# Patient Record
Sex: MALE | Race: WHITE | NOT HISPANIC OR LATINO | Employment: FULL TIME | URBAN - METROPOLITAN AREA
[De-identification: names, ages, dates, MRNs, and addresses within clinical notes are randomized per-mention and may not be internally consistent; named-entity substitution may affect disease eponyms.]

---

## 2018-08-21 ENCOUNTER — HOSPITAL ENCOUNTER (OUTPATIENT)
Dept: RADIOLOGY | Facility: HOSPITAL | Age: 43
Discharge: HOME | End: 2018-08-21
Attending: UROLOGY
Payer: COMMERCIAL

## 2018-08-21 ENCOUNTER — OFFICE VISIT (OUTPATIENT)
Dept: UROLOGY | Facility: CLINIC | Age: 43
End: 2018-08-21
Payer: COMMERCIAL

## 2018-08-21 ENCOUNTER — TELEPHONE (OUTPATIENT)
Dept: UROLOGY | Facility: CLINIC | Age: 43
End: 2018-08-21

## 2018-08-21 DIAGNOSIS — R10.9 FLANK PAIN: ICD-10-CM

## 2018-08-21 DIAGNOSIS — N20.1 URETERAL STONE: ICD-10-CM

## 2018-08-21 DIAGNOSIS — R31.29 MICROHEMATURIA: Primary | ICD-10-CM

## 2018-08-21 DIAGNOSIS — K40.20 BILATERAL INGUINAL HERNIA WITHOUT OBSTRUCTION OR GANGRENE, RECURRENCE NOT SPECIFIED: ICD-10-CM

## 2018-08-21 DIAGNOSIS — R31.29 MICROHEMATURIA: ICD-10-CM

## 2018-08-21 LAB
BACTERIA #/AREA URNS AUTO: ABNORMAL /HPF
BILIRUB UR QL STRIP: NEGATIVE
CLARITY UR REFRACT.AUTO: CLEAR
COLOR UR AUTO: YELLOW
GLUCOSE UR QL STRIP: NEGATIVE
HGB UR QL STRIP: ABNORMAL
KETONES UR QL STRIP: NEGATIVE
LEUKOCYTE ESTERASE UR QL STRIP: NEGATIVE
MICROSCOPIC COMMENT: ABNORMAL
NITRITE UR QL STRIP: NEGATIVE
PH UR STRIP: 6 [PH] (ref 5–8)
PROT UR QL STRIP: NEGATIVE
RBC #/AREA URNS AUTO: 46 /HPF (ref 0–4)
SP GR UR STRIP: 1.02 (ref 1–1.03)
URN SPEC COLLECT METH UR: ABNORMAL
UROBILINOGEN UR STRIP-ACNC: NEGATIVE EU/DL
WBC #/AREA URNS AUTO: 1 /HPF (ref 0–5)

## 2018-08-21 PROCEDURE — 81001 URINALYSIS AUTO W/SCOPE: CPT

## 2018-08-21 PROCEDURE — 74018 RADEX ABDOMEN 1 VIEW: CPT | Mod: 26,,, | Performed by: RADIOLOGY

## 2018-08-21 PROCEDURE — 99243 OFF/OP CNSLTJ NEW/EST LOW 30: CPT | Mod: 25,S$GLB,, | Performed by: UROLOGY

## 2018-08-21 PROCEDURE — 87086 URINE CULTURE/COLONY COUNT: CPT

## 2018-08-21 PROCEDURE — 74018 RADEX ABDOMEN 1 VIEW: CPT | Mod: TC

## 2018-08-21 PROCEDURE — 99999 PR PBB SHADOW E&M-NEW PATIENT-LVL II: CPT | Mod: PBBFAC,,, | Performed by: UROLOGY

## 2018-08-21 PROCEDURE — 81001 URINALYSIS AUTO W/SCOPE: CPT | Mod: S$GLB,,, | Performed by: UROLOGY

## 2018-08-21 RX ORDER — KETOROLAC TROMETHAMINE 10 MG/1
10 TABLET, FILM COATED ORAL EVERY 6 HOURS PRN
Qty: 10 TABLET | Refills: 0 | Status: SHIPPED | OUTPATIENT
Start: 2018-08-21 | End: 2018-08-26

## 2018-08-21 NOTE — PROGRESS NOTES
Subjective:       Patient ID: Darek Mcnair is a 43 y.o. male.    Chief Complaint: flank pain    Darek Mcnair is a 43 y.o. Male referred from Dejuan Hoyt, * for flank pain on left.   Started last night 7-8. No n/v.  No known history of kidney stones.   No gross hematuria.   No testicular pain.     2 sugar free teas a day.   3 salazar a day    Good bit of red meat and salt.   Not a lot of oxalate.     No family hx of stones.     No smoking history.     He does have baseline urinary frequency.        No past surgical history on file.    No past medical history on file.    Social History     Socioeconomic History    Marital status: Single     Spouse name: Not on file    Number of children: Not on file    Years of education: Not on file    Highest education level: Not on file   Social Needs    Financial resource strain: Not on file    Food insecurity - worry: Not on file    Food insecurity - inability: Not on file    Transportation needs - medical: Not on file    Transportation needs - non-medical: Not on file   Occupational History    Not on file   Tobacco Use    Smoking status: Not on file   Substance and Sexual Activity    Alcohol use: Not on file    Drug use: Not on file    Sexual activity: Not on file   Other Topics Concern    Not on file   Social History Narrative    Not on file       No family history on file.    No current outpatient medications on file.     No current facility-administered medications for this visit.        Review of patient's allergies indicates:  Allergies not on file     BMP  No results found for: NA, K, CL, CO2, BUN, CREATININE, CALCIUM, ANIONGAP, ESTGFRAFRICA, EGFRNONAA    Review of Systems   Constitutional: Negative for chills, fever and unexpected weight change.   HENT: Negative for hearing loss and nosebleeds.    Eyes: Negative for visual disturbance.   Respiratory: Negative for chest tightness.    Cardiovascular: Negative for chest pain.    Gastrointestinal: Negative for diarrhea.   Genitourinary: Positive for flank pain and frequency. Negative for dysuria, nocturia and urgency.   Musculoskeletal: Negative for back pain and joint swelling.   Skin: Negative for rash.   Neurological: Negative for seizures.   Hematological: Does not bruise/bleed easily.   Psychiatric/Behavioral: Negative for behavioral problems.     Objective:      Physical Exam   Constitutional: He is oriented to person, place, and time. He appears well-developed and well-nourished.   HENT:   Head: Normocephalic and atraumatic.   Eyes: No scleral icterus.   Neck: Neck supple.   Cardiovascular: Normal rate and regular rhythm.    Pulmonary/Chest: Effort normal. No respiratory distress.   Abdominal: He exhibits no mass. A hernia is present. Hernia confirmed positive in the right inguinal area and confirmed positive in the left inguinal area.   Genitourinary: Testes normal and penis normal.   Musculoskeletal: He exhibits no tenderness.   Lymphadenopathy:     He has no cervical adenopathy. No inguinal adenopathy noted on the right or left side.   Neurological: He is alert and oriented to person, place, and time.   Skin: Skin is warm. No rash noted.     Psychiatric: He has a normal mood and affect.     urine 2+ blood. Micro many RBC. No WBC. No crystals.  Assessment:       1. Microhematuria    2. Flank pain    3. Ureteral stone    4. Bilateral inguinal hernia without obstruction or gangrene, recurrence not specified        Plan:   Diagnoses and all orders for this visit:    Microhematuria  -     CT Renal Stone Study ABD Pelvis WO; Future  -     X-Ray Abdomen AP 1 View; Future  -     Urinalysis  -     Urine culture; Future  -     Basic metabolic panel; Future  -     Urine culture    Flank pain  -     CT Renal Stone Study ABD Pelvis WO; Future  -     X-Ray Abdomen AP 1 View; Future  -     Urinalysis  -     Urine culture; Future  -     Basic metabolic panel; Future  -     Urine  culture    Ureteral stone    Bilateral inguinal hernia without obstruction or gangrene, recurrence not specified      Low oxalate diet (limit spinach, rhubarb, nuts, beets, potatoes, chocolate)  2-3 liters of water a day. (avoid sodas, iced tea)  Limit salt.  Lots of fruits and veggies.  Limit non-dairy animal protein  Make sure you have milk and or yogurt in the diet  No tums, rolaids, vitamin C supplements, Multivitamin  Observe hernias for now.     KUB - if no stone seen, will schedule CT RSS.   Bmp for baseline.   Strain urine for stone analysis.   If not stone seen, will proceed with hematuria workup.    I would like to thank Dejuan Hoyt, * for referral of this patient.

## 2018-08-21 NOTE — TELEPHONE ENCOUNTER
----- Message from Brianna Mo MD sent at 8/21/2018  1:08 PM CDT -----  Let patient know that no stones seen on plain x ray. I will let him know once I get results of CT.   Keep straining urine every single time he urinates.   I sent toradol, just in case he needs it for pain

## 2018-08-21 NOTE — LETTER
August 21, 2018      Dejuan Hoyt MD  [address] [facility] [address]           [facility] - Urology 4th Floor  [address]  [facility] [address]  Phone: [phone]          Patient: Darek Mcnair   MR Number: [id number]   YOB: 1975   Date of Visit: 8/21/2018       Dear Dr. Dejuan Hoyt:    Thank you for referring Darek Mcnair to me for evaluation. Attached you will find relevant portions of my assessment and plan of care.    If you have questions, please do not hesitate to call me. I look forward to following Darek Mcnair along with you.    Sincerely,    Brianna Mo MD    Enclosure  CC:  No Recipients    If you would like to receive this communication electronically, please contact [email or web address] or [phone] to request more information on [employer] Link access.    For providers and/or their staff who would like to refer a patient to [facility], please contact us through our one-stop-shop provider referral line, [facility], at [phone].    If you feel you have received this communication in error or would no longer like to receive these types of communications, please e-mail [email or web address]

## 2018-08-22 LAB — BACTERIA UR CULT: NO GROWTH

## 2018-08-23 ENCOUNTER — HOSPITAL ENCOUNTER (OUTPATIENT)
Dept: RADIOLOGY | Facility: HOSPITAL | Age: 43
Discharge: HOME | End: 2018-08-23
Attending: UROLOGY
Payer: COMMERCIAL

## 2018-08-23 DIAGNOSIS — R31.29 MICROHEMATURIA: ICD-10-CM

## 2018-08-23 DIAGNOSIS — R10.9 FLANK PAIN: ICD-10-CM

## 2018-08-23 PROCEDURE — 74176 CT ABD & PELVIS W/O CONTRAST: CPT | Mod: TC

## 2018-08-23 PROCEDURE — 74176 CT ABD & PELVIS W/O CONTRAST: CPT | Mod: 26,,, | Performed by: RADIOLOGY

## 2018-08-28 ENCOUNTER — PATIENT MESSAGE (OUTPATIENT)
Dept: UROLOGY | Facility: CLINIC | Age: 43
End: 2018-08-28

## 2018-09-04 ENCOUNTER — LAB VISIT (OUTPATIENT)
Dept: LAB | Facility: HOSPITAL | Age: 43
End: 2018-09-04
Attending: UROLOGY
Payer: COMMERCIAL

## 2018-09-04 ENCOUNTER — TELEPHONE (OUTPATIENT)
Dept: UROLOGY | Facility: CLINIC | Age: 43
End: 2018-09-04

## 2018-09-04 DIAGNOSIS — N20.0 KIDNEY STONE: ICD-10-CM

## 2018-09-04 DIAGNOSIS — N20.0 KIDNEY STONE: Primary | ICD-10-CM

## 2018-09-04 PROCEDURE — 82365 CALCULUS SPECTROSCOPY: CPT

## 2018-09-05 LAB
ANNOTATION COMMENT IMP: NORMAL
COMPN STONE: NORMAL
SPECIMEN SOURCE: NORMAL
STONE ANALYSIS IR-IMP: NORMAL

## 2018-09-11 ENCOUNTER — PATIENT MESSAGE (OUTPATIENT)
Dept: UROLOGY | Facility: CLINIC | Age: 43
End: 2018-09-11

## 2020-04-21 DIAGNOSIS — Z01.84 ANTIBODY RESPONSE EXAMINATION: ICD-10-CM

## 2020-04-22 ENCOUNTER — LAB VISIT (OUTPATIENT)
Dept: LAB | Facility: HOSPITAL | Age: 45
End: 2020-04-22
Attending: INTERNAL MEDICINE
Payer: COMMERCIAL

## 2020-04-22 DIAGNOSIS — Z01.84 ANTIBODY RESPONSE EXAMINATION: ICD-10-CM

## 2020-04-22 LAB — SARS-COV-2 IGG SERPL QL IA: NEGATIVE

## 2020-04-22 PROCEDURE — 36415 COLL VENOUS BLD VENIPUNCTURE: CPT

## 2020-04-22 PROCEDURE — 86769 SARS-COV-2 COVID-19 ANTIBODY: CPT

## 2020-07-28 ENCOUNTER — TELEPHONE (OUTPATIENT)
Dept: INTERNAL MEDICINE | Facility: CLINIC | Age: 45
End: 2020-07-28

## 2020-07-28 DIAGNOSIS — Z03.818 ENCOUNTER FOR OBSERVATION FOR SUSPECTED EXPOSURE TO OTHER BIOLOGICAL AGENTS RULED OUT: ICD-10-CM

## 2020-07-29 ENCOUNTER — LAB VISIT (OUTPATIENT)
Dept: INTERNAL MEDICINE | Facility: CLINIC | Age: 45
End: 2020-07-29
Payer: COMMERCIAL

## 2020-07-29 ENCOUNTER — TELEPHONE (OUTPATIENT)
Dept: INTERNAL MEDICINE | Facility: CLINIC | Age: 45
End: 2020-07-29

## 2020-07-29 DIAGNOSIS — Z03.818 ENCOUNTER FOR OBSERVATION FOR SUSPECTED EXPOSURE TO OTHER BIOLOGICAL AGENTS RULED OUT: ICD-10-CM

## 2020-07-29 LAB — SARS-COV-2 RNA RESP QL NAA+PROBE: NOT DETECTED

## 2020-07-29 PROCEDURE — U0003 INFECTIOUS AGENT DETECTION BY NUCLEIC ACID (DNA OR RNA); SEVERE ACUTE RESPIRATORY SYNDROME CORONAVIRUS 2 (SARS-COV-2) (CORONAVIRUS DISEASE [COVID-19]), AMPLIFIED PROBE TECHNIQUE, MAKING USE OF HIGH THROUGHPUT TECHNOLOGIES AS DESCRIBED BY CMS-2020-01-R: HCPCS

## 2020-07-29 NOTE — TELEPHONE ENCOUNTER
Please call Mr. Mcnair to schedule a COVID nasal swab.  I was not sure exactly when he needed to have this test done it done.  He must have a negative COVID nasal swab in order to do his job.  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

## 2020-09-30 ENCOUNTER — OFFICE VISIT (OUTPATIENT)
Dept: ORTHOPEDICS | Facility: CLINIC | Age: 45
End: 2020-09-30
Payer: COMMERCIAL

## 2020-09-30 ENCOUNTER — HOSPITAL ENCOUNTER (OUTPATIENT)
Dept: RADIOLOGY | Facility: HOSPITAL | Age: 45
Discharge: HOME | End: 2020-09-30
Attending: PHYSICIAN ASSISTANT
Payer: COMMERCIAL

## 2020-09-30 ENCOUNTER — PATIENT MESSAGE (OUTPATIENT)
Dept: ORTHOPEDICS | Facility: CLINIC | Age: 45
End: 2020-09-30

## 2020-09-30 VITALS
BODY MASS INDEX: 26.66 KG/M2 | HEART RATE: 88 BPM | SYSTOLIC BLOOD PRESSURE: 116 MMHG | TEMPERATURE: 97 F | HEIGHT: 77 IN | DIASTOLIC BLOOD PRESSURE: 69 MMHG | WEIGHT: 225.75 LBS | RESPIRATION RATE: 18 BRPM

## 2020-09-30 DIAGNOSIS — M76.71 PERONEAL TENDONITIS OF RIGHT LOWER EXTREMITY: Primary | ICD-10-CM

## 2020-09-30 DIAGNOSIS — M25.571 ACUTE RIGHT ANKLE PAIN: ICD-10-CM

## 2020-09-30 PROCEDURE — 99999 PR PBB SHADOW E&M-EST. PATIENT-LVL III: CPT | Mod: PBBFAC,,, | Performed by: PHYSICIAN ASSISTANT

## 2020-09-30 PROCEDURE — 73610 X-RAY EXAM OF ANKLE: CPT | Mod: TC,RT

## 2020-09-30 PROCEDURE — 73610 X-RAY EXAM OF ANKLE: CPT | Mod: 26,RT,, | Performed by: RADIOLOGY

## 2020-09-30 PROCEDURE — 3008F BODY MASS INDEX DOCD: CPT | Mod: CPTII,S$GLB,, | Performed by: PHYSICIAN ASSISTANT

## 2020-09-30 PROCEDURE — 3008F PR BODY MASS INDEX (BMI) DOCUMENTED: ICD-10-PCS | Mod: CPTII,S$GLB,, | Performed by: PHYSICIAN ASSISTANT

## 2020-09-30 PROCEDURE — 99999 PR PBB SHADOW E&M-EST. PATIENT-LVL III: ICD-10-PCS | Mod: PBBFAC,,, | Performed by: PHYSICIAN ASSISTANT

## 2020-09-30 PROCEDURE — 99203 PR OFFICE/OUTPT VISIT, NEW, LEVL III, 30-44 MIN: ICD-10-PCS | Mod: S$GLB,,, | Performed by: PHYSICIAN ASSISTANT

## 2020-09-30 PROCEDURE — 73610 XR ANKLE COMPLETE 3 VIEW RIGHT: ICD-10-PCS | Mod: 26,RT,, | Performed by: RADIOLOGY

## 2020-09-30 PROCEDURE — 99203 OFFICE O/P NEW LOW 30 MIN: CPT | Mod: S$GLB,,, | Performed by: PHYSICIAN ASSISTANT

## 2020-10-01 NOTE — PROGRESS NOTES
Subjective:      Patient ID: Darek Mcnair is a 45 y.o. male.    Chief Complaint: Pain of the Right Ankle    HPI    Patient is a 45 year old male who presents to clinic with chief complaint of a-traumatic right lateral ankle pain x 1 week. Patient stated that he first noticed it after exercising on a treadmill. He reports that it got a little better, but then after a day of working on his feet the pain increased. Pain is increased with activity. He has taken advil. He denied numbness or tingling.     Review of Systems   Constitution: Negative for chills and fever.   Cardiovascular: Negative for chest pain.   Respiratory: Negative for cough and shortness of breath.    Skin: Negative for color change, dry skin, itching, nail changes, poor wound healing and rash.   Musculoskeletal:        Right ankle pain   Neurological: Negative for dizziness.   Psychiatric/Behavioral: Negative for altered mental status. The patient is not nervous/anxious.    All other systems reviewed and are negative.        Objective:      General    Constitutional: He is oriented to person, place, and time. He appears well-developed and well-nourished. No distress.   HENT:   Head: Atraumatic.   Eyes: Conjunctivae are normal.   Cardiovascular: Normal rate.    Pulmonary/Chest: Effort normal.   Neurological: He is alert and oriented to person, place, and time.   Psychiatric: He has a normal mood and affect. His behavior is normal.         Right Ankle/Foot Exam     Tenderness   The patient is tender to palpation of the peroneals.    Range of Motion   The patient has normal right ankle ROM.    Muscle Strength   The patient has normal right ankle strength.    Other   Sensation: normal        RADS: reviewed by myself, no fracture or dislocation.       Assessment:       Encounter Diagnosis   Name Primary?    Peroneal tendonitis of right lower extremity Yes          Plan:     Discussed treatment options/ plan with the patient. At this time we will  treated with functional rest, ice, NSIAD x 2 weeks and boot which he will wean from by 2 weeks. He is to return to clinic as needed if continued symptoms consider formal PT.

## 2020-11-24 ENCOUNTER — PATIENT MESSAGE (OUTPATIENT)
Dept: ORTHOPEDICS | Facility: CLINIC | Age: 45
End: 2020-11-24

## 2020-11-24 DIAGNOSIS — M76.71 PERONEAL TENDONITIS OF RIGHT LOWER EXTREMITY: Primary | ICD-10-CM

## 2020-12-04 ENCOUNTER — CLINICAL SUPPORT (OUTPATIENT)
Dept: REHABILITATION | Facility: HOSPITAL | Age: 45
End: 2020-12-04
Payer: COMMERCIAL

## 2020-12-04 DIAGNOSIS — M76.71 PERONEAL TENDONITIS OF RIGHT LOWER EXTREMITY: ICD-10-CM

## 2020-12-04 PROCEDURE — 97110 THERAPEUTIC EXERCISES: CPT

## 2020-12-04 PROCEDURE — 97161 PT EVAL LOW COMPLEX 20 MIN: CPT

## 2020-12-04 NOTE — PLAN OF CARE
"[facility]  Physical Therapy Initial Evaluation    Date: 12/4/2020   Name: Darek Mcnair  Clinic Number: [id number]    Therapy Diagnosis: No diagnosis found.  Physician: Meredith Trinidad PA-C    Physician Orders: PT Eval and Treat   Medical Diagnosis from Referral: Peroneal tendonitis of right lower extremity   Evaluation Date: 12/4/2020   Authorization Period Expiration: 12/31/2020   Plan of Care Expiration: 1/29/2021  Visit # / Visits authorized: 1/ 20    Time In: 1345  Time Out: 1420  Total Appointment Time (timed & untimed codes): 35 minutes    Precautions: Standard    Subjective   Date of onset: End of Sept  History of current condition - JJ reports: was on a treadmill during Monday night football ran for about 20 min. Next morning R foot near the ankle was sore, and it got worse throughout the day.   Wore a walking boot 2 weeks, iced regularly. Has not been running since injury. Overall progress but slow progress with frequent "steps" backward.   Feels different from a sprained ankle which he has had multiple times both sides.   Has been wearing a lace up ankle brace for about 2 wks which helps somewhat.     Medical History:   No past medical history on file.    Surgical History:   Darek Mcnair  has a past surgical history that includes Eye surgery.    Medications:   Darek currently has no medications in their medication list.    Allergies:   Review of patient's allergies indicates:  No Known Allergies     Imaging, xray: 9/30/20Impression:     Suspect small capsular avulsion fracture of the inferior tip of the lateral malleolus.    Prior Therapy: none  Social History:  lives with their spouse  Occupation: clinical support ophthalmology    Prior Level of Function: independent  Current Level of Function: unable to run. Intermittent severe pain with standing and gait.     Pain:  Current 1/10, worst 7/10, best 0/10   Location: { right ankle(s)  Description: Aching, Dull, Tight " "and Sharp  Aggravating Factors: worst in mornings. Walking, prolonged weight bearing  Easing Factors: rest    Pts goals: return to treadmill running    Objective     Observation: 46 y/o male no acute distress    Gait: wide BENJY, reduced R stance time    Active Range of Motion: WNL all planes    Strength:  Ankle Right Left   Dorsiflexion 4+ 5   Plantarflexion 4+ 5   Inversion 5 5   Eversion 4 5     Special Tests:  Anterior Drawer -   Talar tilt + R    Squeeze test -   Perez -     Joint Mobility: TCJ and subtalar joints WNL    Palpation: TTP distal fibula    Sensation: intact    Functional Tests:   SLS EO: 15 sec R, 3-+ L    Limitation/Restriction for FOTO ankle Survey    Therapist reviewed FOTO scores for Darek Mcnair on 12/4/2020.   FOTO documents entered into [employer] - see Media section.    Limitation Score: 33%         TREATMENT   Treatment Time In: 1410  Treatment Time Out: 1420  Total Treatment time (time-based codes) separate from Evaluation: 10 minutes    JJ received therapeutic exercises to develop strength and endurance for 10 minutes including:  Ankle eversion with OTB  SL stance 3x30"  Next: LE kinetic chain strengthening    Peroneal offloading K tape provided    Home Exercises and Patient Education Provided    Education provided:   - Diagnosis, prognosis, relevant anatomy, role of therapy      Written Home Exercises Provided: yes.  Exercises were reviewed and patient was able to demonstrate them prior to the end of the session.  Patient demonstrated good  understanding of the education provided.     See EMR under Patient Instructions for exercisesprovided 12/4/2020.    Assessment   Darek is a 45 y.o. male referred to outpatient Physical Therapy with a medical diagnosis of Peroneal tendonitis of right lower extremity. Pt presents with reduced strength and motor control of R LE contributing to altered gait mechanics and reduced function. Pt will benefit from skilled PT to return to high PLOF with " reduced pain.     Pt prognosis is Good.   Pt will benefit from skilled outpatient Physical Therapy to address the deficits stated above and in the chart below, provide pt/family education, and to maximize pt's level of independence.     Plan of care discussed with patient: Yes  Pt's spiritual, cultural and educational needs considered and patient is agreeable to the plan of care and goals as stated below:     Anticipated Barriers for therapy: none    Medical Necessity is demonstrated by the following  History  Co-morbidities and personal factors that may impact the plan of care Co-morbidities:   see above    Personal Factors:   no deficits     low   Examination  Body Structures and Functions, activity limitations and participation restrictions that may impact the plan of care Body Regions:   lower extremities    Body Systems:    gross symmetry  strength  gross coordinated movement  balance  gait  motor control    Participation Restrictions:   covid-19    Activity limitations:   Learning and applying knowledge  no deficits    General Tasks and Commands  no deficits    Communication  no deficits    Mobility  walking    Self care  no deficits    Domestic Life  no deficits    Interactions/Relationships  no deficits    Life Areas  no deficits    Community and Social Life  no deficits         high   Clinical Presentation stable and uncomplicated low   Decision Making/ Complexity Score: low     GOALS: Short Term Goals:  4 weeks  1.Report decreased ankle pain  < / =  3/10  to increase tolerance for walking  2. Increase SL balance to 30 sec  3. Increase strength by 1/3 MMT grade for  R ankle eversion  to increase tolerance for ADL and work activities.  4. Pt to tolerate HEP to improve ROM and independence with ADL's    Long Term Goals: 8 weeks  1.Report decreased ankle pain  < / =  1/10  to increase tolerance for walking  2.Patient goal: running treadmill 20 min  3.Increase strength to 5/5 for  eversion  to increase  tolerance for ADL and work activities.  4. Pt will report at CJ level (20-40% impaired) on Modified FIM score for mobility to demonstrate increase in LE function and mobility in home and community environment.       Plan   Plan of care Certification: 12/4/2020 to 1/29/2021.    Outpatient Physical Therapy 1-2 times weekly for 8 weeks to include the following interventions: Moist Heat/ Ice, Neuromuscular Re-ed, Patient Education, Therapeutic Activites and Therapeutic Exercise.     Benjy Alvarez, PT

## 2020-12-11 ENCOUNTER — CLINICAL SUPPORT (OUTPATIENT)
Dept: REHABILITATION | Facility: HOSPITAL | Age: 45
End: 2020-12-11
Payer: COMMERCIAL

## 2020-12-11 PROCEDURE — 97140 MANUAL THERAPY 1/> REGIONS: CPT | Mod: CQ

## 2020-12-11 PROCEDURE — 97110 THERAPEUTIC EXERCISES: CPT | Mod: CQ

## 2020-12-11 NOTE — PROGRESS NOTES
"  Physical Therapy Daily Treatment Note     Name: Darek [facility] Number: [id number]    Therapy Diagnosis: No diagnosis found.  Physician: Meredith Trinidad PA-C    Visit Date: 12/11/2020    Physician Orders: PT Eval and Treat   Medical Diagnosis from Referral: Peroneal tendonitis of right lower extremity   Evaluation Date: 12/4/2020   Authorization Period Expiration: 12/31/2020   Plan of Care Expiration: 1/29/2021  Visit # / Visits authorized: 2/20     Time In: 1515  Time Out: 1600  Total Billable Time: 40 minutes    Precautions: Standard    Subjective     Pt reports: Pt returns to therapy this pm with no c/o R ankle pain upon entry. He states he had a good day today, but had a "set back" on Wednesday. His pain comes and goes.     He was compliant with home exercise program.  Response to previous treatment: No adverse effects  Functional change: N/A    Pain: 0/10, 4/10 on Wednesday  Location: right foot/ankle    Objective     AZUCENA received therapeutic exercises to develop strength, endurance, ROM and flexibility for 25 minutes including:    Inv OTB x 30  SL bridges 2 x 10  6in lateral step downs 2 x 15  Calf raises - d/c d/t pain  Sidelying SL shuttle (2.0 bands) 2 x 10  EV lateral steps c red theraloop x 1 lap  S/L clams GTB 10 x 10" yudy    JEDIE received the following manual therapy techniques: Joint mobilizations were applied for 15 minutes including:    Assessment of ankle ROM  Metatarsal A/P mobs  Midfoot inv/ev  Posterior talocrural mob c FMP into DF  Theragun to R calf/peroneals    JJ participated in neuromuscular re-education activities to improve Balance, Coordination, Proprioception and Neuromuscular Control for 05 minutes. The following activities were included:    SLS on hard foam    JEDIE participated in dynamic functional therapeutic activities to improve functional performance for 00 minutes including:      AZUCENA received cold pack for 00 minutes.      Home Exercises Provided and Patient Education " Provided     Education provided:   - Diagnosis, prognosis, relevant anatomy, role of therapy    Written Home Exercises Provided: yes.  Exercises were reviewed and AZUCENA was able to demonstrate them prior to the end of the session.  AZUCENA demonstrated good  understanding of the education provided.     See EMR under Patient Instructions for exercises provided 12/04/2020.    Assessment     Pt was able to complete all exercises (excluding calf raises) with no c/o increased ankle pain. Lack of DF improved p manual therapy. Noted fatigue during lateral step downs and resisted EV walks. Encouraged continued compliance with HEP. No adverse effects reported p tx.     AZUCENA is progressing well towards his goals.   Pt prognosis is Good.     Pt will continue to benefit from skilled outpatient physical therapy to address the deficits listed in the problem list box on initial evaluation, provide pt/family education and to maximize pt's level of independence in the home and community environment.     Pt's spiritual, cultural and educational needs considered and pt agreeable to plan of care and goals.     Anticipated barriers to physical therapy: none    GOALS: Short Term Goals:  4 weeks  1.Report decreased ankle pain  < / =  3/10  to increase tolerance for walking  2. Increase SL balance to 30 sec  3. Increase strength by 1/3 MMT grade for  R ankle eversion  to increase tolerance for ADL and work activities.  4. Pt to tolerate HEP to improve ROM and independence with ADL's     Long Term Goals: 8 weeks  1.Report decreased ankle pain  < / =  1/10  to increase tolerance for walking  2.Patient goal: running treadmill 20 min  3.Increase strength to 5/5 for  eversion  to increase tolerance for ADL and work activities.  4. Pt will report at CJ level (20-40% impaired) on Modified FIM score for mobility to demonstrate increase in LE function and mobility in home and community environment.        Plan   Plan of care Certification: 12/4/2020 to  1/29/2021.     Outpatient Physical Therapy 1-2 times weekly for 8 weeks to include the following interventions: Moist Heat/ Ice, Neuromuscular Re-ed, Patient Education, Therapeutic Activites and Therapeutic Exercise.     Saba Jon, PTA

## 2020-12-18 ENCOUNTER — OFFICE VISIT (OUTPATIENT)
Dept: URGENT CARE | Facility: CLINIC | Age: 45
End: 2020-12-18
Payer: COMMERCIAL

## 2020-12-18 VITALS
WEIGHT: 225 LBS | HEIGHT: 77 IN | HEART RATE: 96 BPM | RESPIRATION RATE: 16 BRPM | BODY MASS INDEX: 26.57 KG/M2 | OXYGEN SATURATION: 98 % | TEMPERATURE: 98 F | SYSTOLIC BLOOD PRESSURE: 126 MMHG | DIASTOLIC BLOOD PRESSURE: 75 MMHG

## 2020-12-18 DIAGNOSIS — R53.83 FATIGUE, UNSPECIFIED TYPE: Primary | ICD-10-CM

## 2020-12-18 DIAGNOSIS — U07.1 COVID-19 VIRUS DETECTED: ICD-10-CM

## 2020-12-18 LAB
CTP QC/QA: YES
SARS-COV-2 RDRP RESP QL NAA+PROBE: POSITIVE

## 2020-12-18 PROCEDURE — 99203 PR OFFICE/OUTPT VISIT, NEW, LEVL III, 30-44 MIN: ICD-10-PCS | Mod: S$GLB,,, | Performed by: PHYSICIAN ASSISTANT

## 2020-12-18 PROCEDURE — U0002: ICD-10-PCS | Mod: QW,S$GLB,, | Performed by: PHYSICIAN ASSISTANT

## 2020-12-18 PROCEDURE — 99203 OFFICE O/P NEW LOW 30 MIN: CPT | Mod: S$GLB,,, | Performed by: PHYSICIAN ASSISTANT

## 2020-12-18 PROCEDURE — 3008F BODY MASS INDEX DOCD: CPT | Mod: CPTII,S$GLB,, | Performed by: PHYSICIAN ASSISTANT

## 2020-12-18 PROCEDURE — 3008F PR BODY MASS INDEX (BMI) DOCUMENTED: ICD-10-PCS | Mod: CPTII,S$GLB,, | Performed by: PHYSICIAN ASSISTANT

## 2020-12-18 PROCEDURE — U0002 COVID-19 LAB TEST NON-CDC: HCPCS | Mod: QW,S$GLB,, | Performed by: PHYSICIAN ASSISTANT

## 2020-12-18 NOTE — PROGRESS NOTES
"Subjective:       Patient ID: Darek Mcnair is a 45 y.o. male.    Vitals:  height is 6' 5" (1.956 m) and weight is 102.1 kg (225 lb). His temperature is 97.7 °F (36.5 °C). His blood pressure is 126/75 and his pulse is 96. His respiration is 16 and oxygen saturation is 98%.     Chief Complaint: Fatigue    Patient presents to the clinic today with fatigue, slight congestion, and low grade fever. Pt states those symptoms started yesterday and that he lost his taste this morning. Pt is unaware if he has been in direct contact with anyone with covid but would like to get covid tested.     Fatigue  This is a new problem. The current episode started yesterday. The problem occurs constantly. The problem has been unchanged. Associated symptoms include congestion, fatigue and a fever. Pertinent negatives include no abdominal pain, anorexia, arthralgias, change in bowel habit, chest pain, chills, coughing, diaphoresis, headaches, joint swelling, myalgias, nausea, neck pain, numbness, rash, sore throat, swollen glands, urinary symptoms, vertigo, visual change, vomiting or weakness. Nothing aggravates the symptoms. He has tried nothing for the symptoms. The treatment provided no relief.       Constitution: Positive for fatigue and fever. Negative for chills and sweating.   HENT: Positive for congestion. Negative for sore throat.    Neck: Negative for neck pain and painful lymph nodes.   Cardiovascular: Negative for chest pain and leg swelling.   Eyes: Negative for double vision and blurred vision.   Respiratory: Negative for cough and shortness of breath.    Gastrointestinal: Negative for abdominal pain, nausea, vomiting and diarrhea.   Genitourinary: Negative for dysuria, frequency and urgency.   Musculoskeletal: Negative for joint pain, joint swelling, muscle cramps and muscle ache.   Skin: Negative for color change, pale and rash.   Allergic/Immunologic: Negative for seasonal allergies.   Neurological: Negative for " dizziness, history of vertigo, light-headedness, passing out, headaches and numbness.   Hematologic/Lymphatic: Negative for swollen lymph nodes, easy bruising/bleeding and history of blood clots. Does not bruise/bleed easily.   Psychiatric/Behavioral: Negative for nervous/anxious, sleep disturbance and depression. The patient is not nervous/anxious.        Objective:      Physical Exam   Constitutional: He is oriented to person, place, and time. He appears well-developed.  Non-toxic appearance. He does not appear ill. No distress.   HENT:   Head: Normocephalic and atraumatic.   Ears:   Right Ear: Hearing and external ear normal.   Left Ear: Hearing and external ear normal.   ENT exam deferred to keep mask in place. COVID+. Pt agreed.      Comments: ENT exam deferred to keep mask in place. COVID+. Pt agreed.  Eyes: Pupils are equal, round, and reactive to light. Conjunctivae and EOM are normal.   Neck: Normal range of motion. Neck supple. No neck rigidity.   Musculoskeletal: Normal range of motion.   Neurological: He is alert and oriented to person, place, and time.   Skin: Skin is warm, dry and not diaphoretic. Psychiatric: His speech is normal and behavior is normal. Mood normal.   Nursing note and vitals reviewed.      Office Visit on 12/18/2020   Component Date Value Ref Range Status    POC Rapid COVID 12/18/2020 Positive* Negative Final     Acceptable 12/18/2020 Yes   Final           Assessment:       1. Fatigue, unspecified type        Plan:       All hx was provided by the pt or available as part of established EMR. The pt past medical hx, family hx, social hx, and current medications were reviewed. Interpretation of diagnostics performed today were discussed. Tx discussed. Quarantine recommendations based on CDC guidelines discussed. Pt to follow up with [facility] or PCP if no improvement or for any concern. Seek treatment in an ER for worsening. Pt voiced understanding of all discussed, and  agreed.    Fatigue, unspecified type  -     POCT COVID-19 Rapid Screening      Patient Instructions   · Follow up with your primary care if symptoms do not improve, or you may return here at any time.  · If you were referred to a specialist, please follow up with that specialty.  · If you were prescribed antibiotics, please take them to completion.  · If you were prescribed a narcotic or any medication with sedative effects, do not drive or operate heavy equipment or machinery while taking these medications.  · You must understand that you have received treatment at an Urgent Care facility only, and that you may be released before all of your medical problems are known or treated. Urgent Care facilities are not equipped to handle life threatening emergencies. It is recommended that you seek care at an Emergency Department for further evaluation of worsening or concerning symptoms, or possibly life threatening conditions as discussed.                                        If you  smoke, please stop smoking               PLEASE PRACTICE SOCIAL DISTANCING      COVID rapid testing was POSITIVE.  You should quarantine for 10 days from the onset of symptoms. You should be 24 hours symptoms improved/fever free before quarantine end.     You should follow CDC guidelines as well as your employer/school protocols for safely returning to work/school. You should return to work based upon CDC guidelines, unless your employer/school has a different RTW protocol/guidance for you. If you are able to return to work, you should still quarantine outside of work based on CDC guidance as we discussed.     Please refer to CDC website for additional information - https://www.cdc.gov/coronavirus/2019-ncov/if-you-are-sick/index.html.      Over the counter and home treatment of symptoms:  Alternate tylenol and motrin every 3 hours  Salt water gargles  Cold-eeze helps to reduce the duration of sore throat symptoms  Cepachol helps to numb the  discomfort  Chloroseptic spray  Nasal saline spray reduces inflammation and dryness  Warm face compresses as often as you can  Vicks vapor rub at night  Flonase OTC or Nasacort OTC  Simple foods like chicken noodle soup help  Pedialyte helps with dehydration if lacking appetite  Rest as much as you can

## 2020-12-18 NOTE — PATIENT INSTRUCTIONS
· Follow up with your primary care if symptoms do not improve, or you may return here at any time.  · If you were referred to a specialist, please follow up with that specialty.  · If you were prescribed antibiotics, please take them to completion.  · If you were prescribed a narcotic or any medication with sedative effects, do not drive or operate heavy equipment or machinery while taking these medications.  · You must understand that you have received treatment at an Urgent Care facility only, and that you may be released before all of your medical problems are known or treated. Urgent Care facilities are not equipped to handle life threatening emergencies. It is recommended that you seek care at an Emergency Department for further evaluation of worsening or concerning symptoms, or possibly life threatening conditions as discussed.                                        If you  smoke, please stop smoking               PLEASE PRACTICE SOCIAL DISTANCING      COVID rapid testing was POSITIVE.  You should quarantine for 10 days from the onset of symptoms. You should be 24 hours symptoms improved/fever free before quarantine end.     You should follow CDC guidelines as well as your employer/school protocols for safely returning to work/school. You should return to work based upon CDC guidelines, unless your employer/school has a different RTW protocol/guidance for you. If you are able to return to work, you should still quarantine outside of work based on CDC guidance as we discussed.     Please refer to CDC website for additional information - https://www.cdc.gov/coronavirus/2019-ncov/if-you-are-sick/index.html.      Over the counter and home treatment of symptoms:  Alternate tylenol and motrin every 3 hours  Salt water gargles  Cold-eeze helps to reduce the duration of sore throat symptoms  Cepachol helps to numb the discomfort  Chloroseptic spray  Nasal saline spray reduces inflammation and dryness  Warm face  compresses as often as you can  Vicks vapor rub at night  Flonase OTC or Nasacort OTC  Simple foods like chicken noodle soup help  Pedialyte helps with dehydration if lacking appetite  Rest as much as you can

## 2020-12-28 ENCOUNTER — NURSE TRIAGE (OUTPATIENT)
Dept: ADMINISTRATIVE | Facility: CLINIC | Age: 45
End: 2020-12-28

## 2020-12-28 NOTE — TELEPHONE ENCOUNTER
Positive COVID 19 test 12/18/20. Was told generally 7-10 days and can go back to work. Needs to file for extension. Works at main campus. continues with fevers-up to 100F responds to tylenol; continues with fatigue. Gave  p#.and instructed on [facility] virtual visit- pt with no PCP to send message to for follow up.         Reason for Disposition   [1] COVID-19 infection suspected by caller or triager AND [2] mild symptoms (cough, fever, or others) AND [3] no complications or SOB    Additional Information   Negative: Severe difficulty breathing (e.g., struggling for each breath, speaks in single words)   Negative: Difficult to awaken or acting confused (e.g., disoriented, slurred speech)   Negative: Bluish (or gray) lips or face now   Negative: Shock suspected (e.g., cold/pale/clammy skin, too weak to stand, low BP, rapid pulse)   Negative: Sounds like a life-threatening emergency to the triager   Negative: SEVERE or constant chest pain (Exception: mild central chest pain, present only when coughing)   Negative: MODERATE difficulty breathing (e.g., speaks in phrases, SOB even at rest, pulse 100-120)   Negative: Patient sounds very sick or weak to the triager   Negative: Severe difficulty breathing (e.g., struggling for each breath, speaks in single words)   Negative: Difficult to awaken or acting confused (e.g., disoriented, slurred speech)   Negative: Bluish (or gray) lips or face now   Negative: Shock suspected (e.g., cold/pale/clammy skin, too weak to stand, low BP, rapid pulse)   Negative: Sounds like a life-threatening emergency to the triager   Negative: SEVERE or constant chest pain or pressure (Exception: mild central chest pain, present only when coughing)   Negative: MODERATE difficulty breathing (e.g., speaks in phrases, SOB even at rest, pulse 100-120)   Negative: MILD difficulty breathing (e.g., minimal/no SOB at rest, SOB with walking, pulse <100)   Negative: Chest pain    Negative: Patient sounds very sick or weak to the triager   Negative: Fever > 103 F (39.4 C)   Negative: [1] Fever > 101 F (38.3 C) AND [2] age > 60   Negative: [1] Fever > 100.0 F (37.8 C) AND [2] bedridden (e.g., nursing home patient, CVA, chronic illness, recovering from surgery)   Negative: HIGH RISK patient (e.g., age > 64 years, diabetes, heart or lung disease, weak immune system) (Exception: has already been evaluated by healthcare provider and has no new or worsening symptoms)    Protocols used: CORONAVIRUS (COVID-19) DIAGNOSED OR [id number], CORONAVIRUS (COVID-19) - DIAGNOSED OR [id number]

## 2021-01-05 ENCOUNTER — IMMUNIZATION (OUTPATIENT)
Dept: INTERNAL MEDICINE | Facility: CLINIC | Age: 46
End: 2021-01-05
Payer: COMMERCIAL

## 2021-01-05 DIAGNOSIS — Z23 NEED FOR VACCINATION: ICD-10-CM

## 2021-01-05 PROCEDURE — 91300 COVID-19, MRNA, LNP-S, PF, 30 MCG/0.3 ML DOSE VACCINE: CPT | Mod: PBBFAC | Performed by: INTERNAL MEDICINE

## 2021-01-15 ENCOUNTER — CLINICAL SUPPORT (OUTPATIENT)
Dept: REHABILITATION | Facility: HOSPITAL | Age: 46
End: 2021-01-15
Payer: COMMERCIAL

## 2021-01-15 DIAGNOSIS — M25.571 RIGHT ANKLE PAIN, UNSPECIFIED CHRONICITY: ICD-10-CM

## 2021-01-15 DIAGNOSIS — S86.311A STRAIN OF MUSCLE(S) AND TENDON(S) OF PERONEAL MUSCLE GROUP AT LOWER LEG LEVEL, RIGHT LEG, INITIAL ENCOUNTER: ICD-10-CM

## 2021-01-15 PROCEDURE — 97140 MANUAL THERAPY 1/> REGIONS: CPT

## 2021-01-15 PROCEDURE — 97110 THERAPEUTIC EXERCISES: CPT

## 2021-01-22 ENCOUNTER — CLINICAL SUPPORT (OUTPATIENT)
Dept: REHABILITATION | Facility: HOSPITAL | Age: 46
End: 2021-01-22
Payer: COMMERCIAL

## 2021-01-22 DIAGNOSIS — M25.571 RIGHT ANKLE PAIN, UNSPECIFIED CHRONICITY: ICD-10-CM

## 2021-01-22 DIAGNOSIS — S86.311A STRAIN OF MUSCLE(S) AND TENDON(S) OF PERONEAL MUSCLE GROUP AT LOWER LEG LEVEL, RIGHT LEG, INITIAL ENCOUNTER: ICD-10-CM

## 2021-01-22 PROCEDURE — 97112 NEUROMUSCULAR REEDUCATION: CPT | Mod: CQ

## 2021-01-22 PROCEDURE — 97110 THERAPEUTIC EXERCISES: CPT | Mod: CQ

## 2021-01-22 PROCEDURE — 97140 MANUAL THERAPY 1/> REGIONS: CPT | Mod: CQ

## 2021-01-26 ENCOUNTER — IMMUNIZATION (OUTPATIENT)
Dept: INTERNAL MEDICINE | Facility: CLINIC | Age: 46
End: 2021-01-26
Payer: COMMERCIAL

## 2021-01-26 DIAGNOSIS — Z23 NEED FOR VACCINATION: Primary | ICD-10-CM

## 2021-01-26 PROCEDURE — 91300 COVID-19, MRNA, LNP-S, PF, 30 MCG/0.3 ML DOSE VACCINE: CPT | Mod: PBBFAC | Performed by: INTERNAL MEDICINE

## 2021-01-26 PROCEDURE — 0002A COVID-19, MRNA, LNP-S, PF, 30 MCG/0.3 ML DOSE VACCINE: CPT | Mod: PBBFAC | Performed by: INTERNAL MEDICINE

## 2021-01-28 ENCOUNTER — CLINICAL SUPPORT (OUTPATIENT)
Dept: REHABILITATION | Facility: HOSPITAL | Age: 46
End: 2021-01-28
Payer: COMMERCIAL

## 2021-01-28 DIAGNOSIS — M25.571 RIGHT ANKLE PAIN, UNSPECIFIED CHRONICITY: ICD-10-CM

## 2021-01-28 DIAGNOSIS — S86.311A STRAIN OF MUSCLE(S) AND TENDON(S) OF PERONEAL MUSCLE GROUP AT LOWER LEG LEVEL, RIGHT LEG, INITIAL ENCOUNTER: ICD-10-CM

## 2021-01-28 PROCEDURE — 97112 NEUROMUSCULAR REEDUCATION: CPT | Mod: CQ

## 2021-01-28 PROCEDURE — 97110 THERAPEUTIC EXERCISES: CPT | Mod: CQ

## 2021-01-28 PROCEDURE — 97140 MANUAL THERAPY 1/> REGIONS: CPT | Mod: CQ

## 2021-02-05 ENCOUNTER — CLINICAL SUPPORT (OUTPATIENT)
Dept: REHABILITATION | Facility: HOSPITAL | Age: 46
End: 2021-02-05
Payer: COMMERCIAL

## 2021-02-05 DIAGNOSIS — S86.311A STRAIN OF MUSCLE(S) AND TENDON(S) OF PERONEAL MUSCLE GROUP AT LOWER LEG LEVEL, RIGHT LEG, INITIAL ENCOUNTER: ICD-10-CM

## 2021-02-05 DIAGNOSIS — M25.571 RIGHT ANKLE PAIN, UNSPECIFIED CHRONICITY: ICD-10-CM

## 2021-02-05 PROCEDURE — 97112 NEUROMUSCULAR REEDUCATION: CPT

## 2021-02-05 PROCEDURE — 97110 THERAPEUTIC EXERCISES: CPT

## 2021-02-12 ENCOUNTER — CLINICAL SUPPORT (OUTPATIENT)
Dept: REHABILITATION | Facility: HOSPITAL | Age: 46
End: 2021-02-12
Payer: COMMERCIAL

## 2021-02-12 DIAGNOSIS — S86.311A STRAIN OF MUSCLE(S) AND TENDON(S) OF PERONEAL MUSCLE GROUP AT LOWER LEG LEVEL, RIGHT LEG, INITIAL ENCOUNTER: ICD-10-CM

## 2021-02-12 DIAGNOSIS — M25.571 RIGHT ANKLE PAIN, UNSPECIFIED CHRONICITY: ICD-10-CM

## 2021-02-12 PROCEDURE — 97110 THERAPEUTIC EXERCISES: CPT

## 2021-02-12 PROCEDURE — 97112 NEUROMUSCULAR REEDUCATION: CPT

## 2021-02-19 ENCOUNTER — CLINICAL SUPPORT (OUTPATIENT)
Dept: REHABILITATION | Facility: HOSPITAL | Age: 46
End: 2021-02-19
Payer: COMMERCIAL

## 2021-02-19 DIAGNOSIS — S86.311A STRAIN OF MUSCLE(S) AND TENDON(S) OF PERONEAL MUSCLE GROUP AT LOWER LEG LEVEL, RIGHT LEG, INITIAL ENCOUNTER: ICD-10-CM

## 2021-02-19 DIAGNOSIS — M25.571 RIGHT ANKLE PAIN, UNSPECIFIED CHRONICITY: ICD-10-CM

## 2021-02-19 PROCEDURE — 97112 NEUROMUSCULAR REEDUCATION: CPT

## 2021-02-19 PROCEDURE — 97110 THERAPEUTIC EXERCISES: CPT

## 2021-03-05 ENCOUNTER — CLINICAL SUPPORT (OUTPATIENT)
Dept: REHABILITATION | Facility: HOSPITAL | Age: 46
End: 2021-03-05
Payer: COMMERCIAL

## 2021-03-05 DIAGNOSIS — S86.311A STRAIN OF MUSCLE(S) AND TENDON(S) OF PERONEAL MUSCLE GROUP AT LOWER LEG LEVEL, RIGHT LEG, INITIAL ENCOUNTER: ICD-10-CM

## 2021-03-05 DIAGNOSIS — M25.571 RIGHT ANKLE PAIN, UNSPECIFIED CHRONICITY: ICD-10-CM

## 2021-03-05 PROCEDURE — 97110 THERAPEUTIC EXERCISES: CPT

## 2021-11-02 ENCOUNTER — OFFICE VISIT (OUTPATIENT)
Dept: INTERNAL MEDICINE | Facility: CLINIC | Age: 46
End: 2021-11-02
Payer: COMMERCIAL

## 2021-11-02 ENCOUNTER — HOSPITAL ENCOUNTER (OUTPATIENT)
Dept: RADIOLOGY | Facility: HOSPITAL | Age: 46
Discharge: HOME | End: 2021-11-02
Attending: STUDENT IN AN ORGANIZED HEALTH CARE EDUCATION/TRAINING PROGRAM
Payer: COMMERCIAL

## 2021-11-02 VITALS
BODY MASS INDEX: 25.98 KG/M2 | HEART RATE: 64 BPM | DIASTOLIC BLOOD PRESSURE: 78 MMHG | WEIGHT: 220 LBS | HEIGHT: 77 IN | SYSTOLIC BLOOD PRESSURE: 118 MMHG

## 2021-11-02 DIAGNOSIS — Z00.00 HEALTH MAINTENANCE EXAMINATION: ICD-10-CM

## 2021-11-02 DIAGNOSIS — Z12.11 COLON CANCER SCREENING: ICD-10-CM

## 2021-11-02 DIAGNOSIS — M54.9 ACUTE LEFT-SIDED BACK PAIN, UNSPECIFIED BACK LOCATION: ICD-10-CM

## 2021-11-02 DIAGNOSIS — M54.9 ACUTE LEFT-SIDED BACK PAIN, UNSPECIFIED BACK LOCATION: Primary | ICD-10-CM

## 2021-11-02 DIAGNOSIS — Z11.59 ENCOUNTER FOR HEPATITIS C SCREENING TEST FOR LOW RISK PATIENT: ICD-10-CM

## 2021-11-02 DIAGNOSIS — Z11.4 ENCOUNTER FOR SCREENING FOR HIV: ICD-10-CM

## 2021-11-02 PROBLEM — M25.571 RIGHT ANKLE PAIN: Status: RESOLVED | Noted: 2021-01-15 | Resolved: 2021-11-02

## 2021-11-02 PROCEDURE — 99213 OFFICE O/P EST LOW 20 MIN: CPT | Mod: S$GLB,,, | Performed by: STUDENT IN AN ORGANIZED HEALTH CARE EDUCATION/TRAINING PROGRAM

## 2021-11-02 PROCEDURE — 99213 PR OFFICE/OUTPT VISIT, EST, LEVL III, 20-29 MIN: ICD-10-PCS | Mod: S$GLB,,, | Performed by: STUDENT IN AN ORGANIZED HEALTH CARE EDUCATION/TRAINING PROGRAM

## 2021-11-02 PROCEDURE — 99999 PR PBB SHADOW E&M-EST. PATIENT-LVL III: CPT | Mod: PBBFAC,,, | Performed by: STUDENT IN AN ORGANIZED HEALTH CARE EDUCATION/TRAINING PROGRAM

## 2021-11-02 PROCEDURE — 74018 XR ABDOMEN AP 1 VIEW: ICD-10-PCS | Mod: 26,,, | Performed by: RADIOLOGY

## 2021-11-02 PROCEDURE — 74018 RADEX ABDOMEN 1 VIEW: CPT | Mod: TC

## 2021-11-02 PROCEDURE — 74018 RADEX ABDOMEN 1 VIEW: CPT | Mod: 26,,, | Performed by: RADIOLOGY

## 2021-11-02 PROCEDURE — 99999 PR PBB SHADOW E&M-EST. PATIENT-LVL III: ICD-10-PCS | Mod: PBBFAC,,, | Performed by: STUDENT IN AN ORGANIZED HEALTH CARE EDUCATION/TRAINING PROGRAM

## 2021-11-02 RX ORDER — MULTIVITAMIN
1 TABLET ORAL DAILY
COMMUNITY

## 2021-11-02 RX ORDER — AMOXICILLIN 500 MG
CAPSULE ORAL DAILY
COMMUNITY

## 2021-11-26 ENCOUNTER — TELEPHONE (OUTPATIENT)
Dept: ADMINISTRATIVE | Facility: OTHER | Age: 46
End: 2021-11-26
Payer: COMMERCIAL

## 2022-01-07 ENCOUNTER — DOCUMENTATION ONLY (OUTPATIENT)
Dept: ADMINISTRATIVE | Facility: HOSPITAL | Age: 47
End: 2022-01-07
Payer: COMMERCIAL

## 2022-01-07 ENCOUNTER — IMMUNIZATION (OUTPATIENT)
Dept: INTERNAL MEDICINE | Facility: CLINIC | Age: 47
End: 2022-01-07
Payer: COMMERCIAL

## 2022-01-07 DIAGNOSIS — Z23 NEED FOR VACCINATION: Primary | ICD-10-CM

## 2022-01-07 LAB
CTP QC/QA: YES
SARS-COV-2 AG RESP QL IA.RAPID: NEGATIVE

## 2022-01-07 PROCEDURE — 0004A COVID-19, MRNA, LNP-S, PF, 30 MCG/0.3 ML DOSE VACCINE: CPT | Mod: CV19,PBBFAC | Performed by: INTERNAL MEDICINE

## 2022-01-10 ENCOUNTER — DOCUMENTATION ONLY (OUTPATIENT)
Dept: ADMINISTRATIVE | Facility: HOSPITAL | Age: 47
End: 2022-01-10
Payer: COMMERCIAL

## 2022-01-10 LAB
CTP QC/QA: YES
SARS-COV-2 AG RESP QL IA.RAPID: NEGATIVE

## 2022-01-12 ENCOUNTER — PATIENT MESSAGE (OUTPATIENT)
Dept: PRIMARY CARE CLINIC | Facility: CLINIC | Age: 47
End: 2022-01-12
Payer: COMMERCIAL

## 2022-01-12 LAB
CTP QC/QA: YES
SARS-COV-2 AG RESP QL IA.RAPID: POSITIVE

## 2022-01-13 ENCOUNTER — DOCUMENTATION ONLY (OUTPATIENT)
Dept: ADMINISTRATIVE | Facility: HOSPITAL | Age: 47
End: 2022-01-13
Payer: COMMERCIAL

## 2022-02-25 DIAGNOSIS — R05.9 COUGH: Primary | ICD-10-CM

## 2022-02-25 DIAGNOSIS — J02.9 SORE THROAT: ICD-10-CM

## 2022-02-25 LAB
CTP QC/QA: YES
SARS-COV-2 AG RESP QL IA.RAPID: NEGATIVE

## 2022-03-28 ENCOUNTER — PROCEDURE VISIT (OUTPATIENT)
Dept: OPHTHALMOLOGY | Facility: CLINIC | Age: 47
End: 2022-03-28
Payer: COMMERCIAL

## 2022-03-28 DIAGNOSIS — L98.9 FACIAL LESION: Primary | ICD-10-CM

## 2022-03-28 PROCEDURE — 88305 TISSUE EXAM BY PATHOLOGIST: ICD-10-PCS | Mod: 26,,, | Performed by: DERMATOLOGY

## 2022-03-28 PROCEDURE — 88305 TISSUE EXAM BY PATHOLOGIST: CPT | Performed by: DERMATOLOGY

## 2022-03-28 PROCEDURE — 88305 TISSUE EXAM BY PATHOLOGIST: CPT | Mod: 26,,, | Performed by: DERMATOLOGY

## 2022-03-28 PROCEDURE — 99203 PR OFFICE/OUTPT VISIT, NEW, LEVL III, 30-44 MIN: ICD-10-PCS | Mod: S$GLB,,, | Performed by: OPHTHALMOLOGY

## 2022-03-28 PROCEDURE — 99203 OFFICE O/P NEW LOW 30 MIN: CPT | Mod: S$GLB,,, | Performed by: OPHTHALMOLOGY

## 2022-03-28 NOTE — PROGRESS NOTES
HPI     Patient here for excision of lesion and biopsy    Last edited by Keerthi Lino on 3/28/2022  3:02 PM. (History)            Assessment /Plan     For exam results, see Encounter Report.    Facial lesion  -     Specimen to Pathology Ophthalmology         Patient is a pleasant 46-year-old male here for evaluation of left temple growth.  This has been present for approximately 6 months.  There has occasional crusting there have been some episodes of bleeding from this area.  The patient does not have any prior history of skin cancer or facial trauma.  He does have a history of wisdom tooth extraction years ago.  He also has a history of bilateral LASIK.    On exam, the patient is a 5 mm x 4 mm raised nodular growth within the left temple.  There is no preauricular or submandibular adenopathy.    These findings were discussed with the patient.    Recommend excisional biopsy in the minor procedure room today and sent for pathology.    Pertinent risks benefits alternatives were discussed with the patient prior to obtaining informed consent.      PROCEDURE NOTE:  Procedure: Excision and biopsy of left temple skin lesion  Indication: Left temple skin lesion, rule out malignancy  Attending: Jessica Dietz MD  Resident: Jone Moreno MD  Procedure details:   Risks, benefits, and alternatives of lesion excision and biopsy were discussed.  The patient voiced understanding and wished to proceed.  Informed consent was obtained. The area was cleaned with alcohol. The area was anesthetized using 0.5 cc of subcutaneous 2% lidocaine with 1:100,000 epinephrine.The lesion was shaved with a #15 blade. Hemostasis was obtained with high temp cautery. Tobradex ointment was placed.

## 2022-04-05 LAB
FINAL PATHOLOGIC DIAGNOSIS: NORMAL
GROSS: NORMAL
Lab: NORMAL
MICROSCOPIC EXAM: NORMAL

## 2022-04-06 ENCOUNTER — TELEPHONE (OUTPATIENT)
Dept: DERMATOLOGY | Facility: CLINIC | Age: 47
End: 2022-04-06
Payer: COMMERCIAL

## 2022-05-09 ENCOUNTER — TELEPHONE (OUTPATIENT)
Dept: DERMATOLOGY | Facility: CLINIC | Age: 47
End: 2022-05-09
Payer: COMMERCIAL

## 2022-05-09 NOTE — TELEPHONE ENCOUNTER
Pt was r/s for Mohs sx on 6/2/2022 at 12:30 for SCC left temple. Pt verbally confirmed new appt date and time.

## 2022-05-09 NOTE — TELEPHONE ENCOUNTER
Left message informing pt that his Mohs sx has been cx due to SSW illness. Pt was told that he will get a call back to r/s. Left phone number for pt to call to confirm that he had received the message.

## 2022-05-09 NOTE — TELEPHONE ENCOUNTER
----- Message from Yoana Banda sent at 5/9/2022 11:24 AM CDT -----  Regarding: pt advice  Contact: pt @ [phone]  Pt calling to speak with someone in Dr. Burgess's office regarding his appointment cancellation. Please call.

## 2022-06-02 ENCOUNTER — PROCEDURE VISIT (OUTPATIENT)
Dept: DERMATOLOGY | Facility: CLINIC | Age: 47
End: 2022-06-02
Payer: COMMERCIAL

## 2022-06-02 VITALS
SYSTOLIC BLOOD PRESSURE: 122 MMHG | HEIGHT: 77 IN | WEIGHT: 220 LBS | BODY MASS INDEX: 25.98 KG/M2 | DIASTOLIC BLOOD PRESSURE: 87 MMHG | HEART RATE: 69 BPM

## 2022-06-02 DIAGNOSIS — D04.39 SQUAMOUS CELL CARCINOMA IN SITU OF SKIN OF LEFT TEMPLE REGION: Primary | ICD-10-CM

## 2022-06-02 PROCEDURE — 99499 NO LOS: ICD-10-PCS | Mod: S$GLB,,, | Performed by: DERMATOLOGY

## 2022-06-02 PROCEDURE — 99499 UNLISTED E&M SERVICE: CPT | Mod: S$GLB,,, | Performed by: DERMATOLOGY

## 2022-06-02 PROCEDURE — 17311 MOHS 1 STAGE H/N/HF/G: CPT | Mod: 51,S$GLB,, | Performed by: DERMATOLOGY

## 2022-06-02 PROCEDURE — 13131 PR RECMPL WND HEAD,FAC,HAND 1.1-2.5 CM: ICD-10-PCS | Mod: S$GLB,,, | Performed by: DERMATOLOGY

## 2022-06-02 PROCEDURE — 17311: ICD-10-PCS | Mod: 51,S$GLB,, | Performed by: DERMATOLOGY

## 2022-06-02 PROCEDURE — 13131 CMPLX RPR F/C/C/M/N/AX/G/H/F: CPT | Mod: S$GLB,,, | Performed by: DERMATOLOGY

## 2022-09-08 ENCOUNTER — OFFICE VISIT (OUTPATIENT)
Dept: INTERNAL MEDICINE | Facility: CLINIC | Age: 47
End: 2022-09-08
Payer: COMMERCIAL

## 2022-09-08 VITALS
HEIGHT: 77 IN | OXYGEN SATURATION: 95 % | SYSTOLIC BLOOD PRESSURE: 116 MMHG | HEART RATE: 79 BPM | WEIGHT: 208.75 LBS | DIASTOLIC BLOOD PRESSURE: 66 MMHG | BODY MASS INDEX: 24.65 KG/M2

## 2022-09-08 DIAGNOSIS — M62.830 SPASM OF MUSCLE OF LOWER BACK: ICD-10-CM

## 2022-09-08 DIAGNOSIS — Z00.00 ANNUAL PHYSICAL EXAM: Primary | ICD-10-CM

## 2022-09-08 DIAGNOSIS — Z12.11 SPECIAL SCREENING FOR MALIGNANT NEOPLASMS, COLON: ICD-10-CM

## 2022-09-08 PROCEDURE — 99213 PR OFFICE/OUTPT VISIT, EST, LEVL III, 20-29 MIN: ICD-10-PCS | Mod: S$GLB,,, | Performed by: STUDENT IN AN ORGANIZED HEALTH CARE EDUCATION/TRAINING PROGRAM

## 2022-09-08 PROCEDURE — 99999 PR PBB SHADOW E&M-EST. PATIENT-LVL IV: ICD-10-PCS | Mod: PBBFAC,,, | Performed by: STUDENT IN AN ORGANIZED HEALTH CARE EDUCATION/TRAINING PROGRAM

## 2022-09-08 PROCEDURE — 99999 PR PBB SHADOW E&M-EST. PATIENT-LVL IV: CPT | Mod: PBBFAC,,, | Performed by: STUDENT IN AN ORGANIZED HEALTH CARE EDUCATION/TRAINING PROGRAM

## 2022-09-08 PROCEDURE — 99213 OFFICE O/P EST LOW 20 MIN: CPT | Mod: S$GLB,,, | Performed by: STUDENT IN AN ORGANIZED HEALTH CARE EDUCATION/TRAINING PROGRAM

## 2022-09-08 PROCEDURE — 3074F PR MOST RECENT SYSTOLIC BLOOD PRESSURE < 130 MM HG: ICD-10-PCS | Mod: CPTII,S$GLB,, | Performed by: STUDENT IN AN ORGANIZED HEALTH CARE EDUCATION/TRAINING PROGRAM

## 2022-09-08 PROCEDURE — 3078F DIAST BP <80 MM HG: CPT | Mod: CPTII,S$GLB,, | Performed by: STUDENT IN AN ORGANIZED HEALTH CARE EDUCATION/TRAINING PROGRAM

## 2022-09-08 PROCEDURE — 3008F PR BODY MASS INDEX (BMI) DOCUMENTED: ICD-10-PCS | Mod: CPTII,S$GLB,, | Performed by: STUDENT IN AN ORGANIZED HEALTH CARE EDUCATION/TRAINING PROGRAM

## 2022-09-08 PROCEDURE — 3078F PR MOST RECENT DIASTOLIC BLOOD PRESSURE < 80 MM HG: ICD-10-PCS | Mod: CPTII,S$GLB,, | Performed by: STUDENT IN AN ORGANIZED HEALTH CARE EDUCATION/TRAINING PROGRAM

## 2022-09-08 PROCEDURE — 3008F BODY MASS INDEX DOCD: CPT | Mod: CPTII,S$GLB,, | Performed by: STUDENT IN AN ORGANIZED HEALTH CARE EDUCATION/TRAINING PROGRAM

## 2022-09-08 PROCEDURE — 3074F SYST BP LT 130 MM HG: CPT | Mod: CPTII,S$GLB,, | Performed by: STUDENT IN AN ORGANIZED HEALTH CARE EDUCATION/TRAINING PROGRAM

## 2022-09-08 RX ORDER — CYCLOBENZAPRINE HCL 5 MG
5 TABLET ORAL 3 TIMES DAILY PRN
Qty: 45 TABLET | Refills: 0 | Status: SHIPPED | OUTPATIENT
Start: 2022-09-08 | End: 2022-09-23

## 2022-09-08 NOTE — PROGRESS NOTES
"Clinic Note  9/8/2022      Subjective:       Patient ID:  AZUCENA is a 47 y.o. male being seen for an established visit.    Chief Complaint: No chief complaint on file.    Mr. Mcnair is a 47 M with PMHx of insomnia, SSC of skin, nephrolithiasis who presents to clinic annual physical and reoccurring back pain. Patient mentions since last office visit with Dr. Sousa on 11/2/21, has experienced left sided back discomfort characterized as "tightness": Over the last 1 month, tightness has become constant, occurring daily. Worsened with standing, twisting and improved with sitting with support against back of chair or certain positions while sitting. Denies change  in bladder/blower function, trauma to back, prior surgeries, radiation of pain/tightness.     Low-back Pain  This is a recurrent problem. The current episode started more than 1 month ago. The problem occurs constantly. The problem has been unchanged. Pertinent negatives include no abdominal pain, change in bowel habit, chest pain, chills, coughing, diaphoresis, fever, headaches, myalgias, nausea, numbness, rash, sore throat, vomiting or weakness. The symptoms are aggravated by standing and twisting. He has tried NSAIDs (stretching) for the symptoms. The treatment provided mild relief.     Review of Systems   Constitutional:  Negative for chills, diaphoresis, fever, malaise/fatigue and weight loss.   HENT:  Negative for sore throat.    Eyes:  Negative for blurred vision.   Respiratory:  Negative for cough and shortness of breath.    Cardiovascular:  Negative for chest pain, palpitations and leg swelling.   Gastrointestinal:  Negative for abdominal pain, change in bowel habit, constipation, diarrhea, heartburn, nausea and vomiting.   Genitourinary:  Negative for dysuria.   Musculoskeletal:  Positive for back pain. Negative for joint pain and myalgias.   Skin:  Negative for rash.   Neurological:  Negative for tingling, weakness, numbness and headaches. " "  Endo/Heme/Allergies:  Negative for environmental allergies.   Psychiatric/Behavioral:  Negative for depression. The patient does not have insomnia.      Past Medical History:   Diagnosis Date    Squamous cell carcinoma of skin        Family History   Problem Relation Age of Onset    Heart disease Maternal Aunt     Heart disease Maternal Uncle     Heart disease Maternal Grandmother     Heart disease Maternal Grandfather         reports that he has quit smoking. His smoking use included cigarettes. He has never used smokeless tobacco. He reports current alcohol use of about 2.0 standard drinks per week. He reports that he does not use drugs.    Medication List with Changes/Refills   New Medications    CYCLOBENZAPRINE (FLEXERIL) 5 MG TABLET    Take 1 tablet (5 mg total) by mouth 3 (three) times daily as needed for Muscle spasms.   Current Medications    MULTIVITAMIN (THERAGRAN) PER TABLET    Take 1 tablet by mouth once daily.    OMEGA-3 FATTY ACIDS/FISH OIL (FISH OIL-OMEGA-3 FATTY ACIDS) 300-1,000 MG CAPSULE    Take by mouth once daily.     Review of patient's allergies indicates:  No Known Allergies    Patient Active Problem List   Diagnosis    Strain of muscle(s) and tendon(s) of peroneal muscle group at lower leg level, right leg, initial encounter           Objective:      /66   Pulse 79   Ht 6' 5" (1.956 m)   Wt 94.7 kg (208 lb 12.4 oz)   SpO2 95%   BMI 24.76 kg/m²   Estimated body mass index is 24.76 kg/m² as calculated from the following:    Height as of this encounter: 6' 5" (1.956 m).    Weight as of this encounter: 94.7 kg (208 lb 12.4 oz).  Physical Exam  Constitutional:       General: He is not in acute distress.     Appearance: Normal appearance. He is not diaphoretic.   HENT:      Head: Normocephalic and atraumatic.      Nose: Nose normal.      Mouth/Throat:      Mouth: Mucous membranes are moist.   Eyes:      General: No scleral icterus.     Extraocular Movements: Extraocular movements " intact.      Conjunctiva/sclera: Conjunctivae normal.   Neck:      Thyroid: No thyromegaly.   Cardiovascular:      Rate and Rhythm: Normal rate and regular rhythm.      Pulses: Normal pulses.      Heart sounds: Normal heart sounds.   Pulmonary:      Effort: Pulmonary effort is normal.      Breath sounds: Normal breath sounds. No wheezing or rales.   Abdominal:      General: Bowel sounds are normal. There is no distension.      Palpations: Abdomen is soft. There is no mass.      Tenderness: There is no abdominal tenderness.   Musculoskeletal:         General: Normal range of motion.      Cervical back: Normal and normal range of motion.      Thoracic back: Normal.      Lumbar back: Tenderness present. No swelling, edema, deformity, lacerations or bony tenderness. Normal range of motion.      Right lower leg: No edema.      Left lower leg: No edema.      Comments: TTP paravertebral area of L lumbar spine.    Skin:     General: Skin is warm and dry.      Findings: No rash.   Neurological:      General: No focal deficit present.      Mental Status: He is alert and oriented to person, place, and time.   Psychiatric:         Mood and Affect: Mood and affect normal.         Behavior: Behavior normal.         Thought Content: Thought content normal.         Judgment: Judgment normal.         Assessment and Plan:   47 M who presents for annual. Also complains of back pain consistent with MSK spasm. Recommended to continue stretching, warm compress and initiation of muscle relaxer.       Diagnoses and all orders for this visit:    Annual physical exam  -     Cologuard Screening (Multitarget Stool DNA); Future  -     CBC W/ AUTO DIFFERENTIAL; Future  -     COMPREHENSIVE METABOLIC PANEL; Future  -     LIPID PANEL; Future  -     Cologuard Screening (Multitarget Stool DNA)    Special screening for malignant neoplasms, colon  -     Cologuard Screening (Multitarget Stool DNA); Future  -     Cologuard Screening (Multitarget Stool  DNA)    Spasm of muscle of lower back  -     cyclobenzaprine (FLEXERIL) 5 MG tablet; Take 1 tablet (5 mg total) by mouth 3 (three) times daily as needed for Muscle spasms.  - Warm compresses to relax muscles   - stretching   - Tylenol PRN  - If not improved, will consider healthy back program for further evaluation.         Follow up in about 1 year (around 9/8/2023).    Other Orders Placed This Visit:  Orders Placed This Encounter   Procedures    Cologuard Screening (Multitarget Stool DNA)    CBC W/ AUTO DIFFERENTIAL    COMPREHENSIVE METABOLIC PANEL    LIPID PANEL         Bimal Olson MD  Internal Medicine, PGY-3    Discussed with

## 2022-09-08 NOTE — PATIENT INSTRUCTIONS
You were seen today for your annual exam     After today's visit:    your prescription for flexeril and read the attached information regarding plan to reduce/eliminate the tightness in your back   Flexeril three times a day as needed   Warm compresses to relax muscles   Continue your stretching   It is ok to also use tylenol as needed for pain in addition to the muscle relaxer   Get your lab work done. Will need to fast for lipid lab  Your cologuard will be mailed to you

## 2022-09-09 ENCOUNTER — OFFICE VISIT (OUTPATIENT)
Dept: DERMATOLOGY | Facility: CLINIC | Age: 47
End: 2022-09-09
Payer: COMMERCIAL

## 2022-09-09 ENCOUNTER — LAB VISIT (OUTPATIENT)
Dept: LAB | Facility: HOSPITAL | Age: 47
End: 2022-09-09
Payer: COMMERCIAL

## 2022-09-09 DIAGNOSIS — L73.8 SEBACEOUS GLAND HYPERPLASIA: ICD-10-CM

## 2022-09-09 DIAGNOSIS — L72.0 EIC (EPIDERMAL INCLUSION CYST): ICD-10-CM

## 2022-09-09 DIAGNOSIS — Z85.828 HISTORY OF NONMELANOMA SKIN CANCER: ICD-10-CM

## 2022-09-09 DIAGNOSIS — Z00.00 ANNUAL PHYSICAL EXAM: ICD-10-CM

## 2022-09-09 DIAGNOSIS — L82.1 SK (SEBORRHEIC KERATOSIS): Primary | ICD-10-CM

## 2022-09-09 DIAGNOSIS — L81.4 LENTIGO: ICD-10-CM

## 2022-09-09 DIAGNOSIS — D18.01 ANGIOMA OF SKIN: ICD-10-CM

## 2022-09-09 LAB
ALBUMIN SERPL BCP-MCNC: 4.3 G/DL (ref 3.5–5.2)
ALP SERPL-CCNC: 70 U/L (ref 55–135)
ALT SERPL W/O P-5'-P-CCNC: 21 U/L (ref 10–44)
ANION GAP SERPL CALC-SCNC: 8 MMOL/L (ref 8–16)
AST SERPL-CCNC: 19 U/L (ref 10–40)
BASOPHILS # BLD AUTO: 0.02 K/UL (ref 0–0.2)
BASOPHILS NFR BLD: 0.4 % (ref 0–1.9)
BILIRUB SERPL-MCNC: 0.8 MG/DL (ref 0.1–1)
BUN SERPL-MCNC: 13 MG/DL (ref 6–20)
CALCIUM SERPL-MCNC: 9.8 MG/DL (ref 8.7–10.5)
CHLORIDE SERPL-SCNC: 103 MMOL/L (ref 95–110)
CHOLEST SERPL-MCNC: 196 MG/DL (ref 120–199)
CHOLEST/HDLC SERPL: 4.6 {RATIO} (ref 2–5)
CO2 SERPL-SCNC: 29 MMOL/L (ref 23–29)
CREAT SERPL-MCNC: 1 MG/DL (ref 0.5–1.4)
DIFFERENTIAL METHOD: NORMAL
EOSINOPHIL # BLD AUTO: 0.1 K/UL (ref 0–0.5)
EOSINOPHIL NFR BLD: 2 % (ref 0–8)
ERYTHROCYTE [DISTWIDTH] IN BLOOD BY AUTOMATED COUNT: 12.2 % (ref 11.5–14.5)
EST. GFR  (NO RACE VARIABLE): >60 ML/MIN/1.73 M^2
GLUCOSE SERPL-MCNC: 89 MG/DL (ref 70–110)
HCT VFR BLD AUTO: 46.9 % (ref 40–54)
HDLC SERPL-MCNC: 43 MG/DL (ref 40–75)
HDLC SERPL: 21.9 % (ref 20–50)
HGB BLD-MCNC: 15.4 G/DL (ref 14–18)
IMM GRANULOCYTES # BLD AUTO: 0.01 K/UL (ref 0–0.04)
IMM GRANULOCYTES NFR BLD AUTO: 0.2 % (ref 0–0.5)
LDLC SERPL CALC-MCNC: 135.4 MG/DL (ref 63–159)
LYMPHOCYTES # BLD AUTO: 1.6 K/UL (ref 1–4.8)
LYMPHOCYTES NFR BLD: 32 % (ref 18–48)
MCH RBC QN AUTO: 30.1 PG (ref 27–31)
MCHC RBC AUTO-ENTMCNC: 32.8 G/DL (ref 32–36)
MCV RBC AUTO: 92 FL (ref 82–98)
MONOCYTES # BLD AUTO: 0.5 K/UL (ref 0.3–1)
MONOCYTES NFR BLD: 9.9 % (ref 4–15)
NEUTROPHILS # BLD AUTO: 2.8 K/UL (ref 1.8–7.7)
NEUTROPHILS NFR BLD: 55.5 % (ref 38–73)
NONHDLC SERPL-MCNC: 153 MG/DL
NRBC BLD-RTO: 0 /100 WBC
PLATELET # BLD AUTO: 273 K/UL (ref 150–450)
PMV BLD AUTO: 10 FL (ref 9.2–12.9)
POTASSIUM SERPL-SCNC: 3.8 MMOL/L (ref 3.5–5.1)
PROT SERPL-MCNC: 7.4 G/DL (ref 6–8.4)
RBC # BLD AUTO: 5.12 M/UL (ref 4.6–6.2)
SODIUM SERPL-SCNC: 140 MMOL/L (ref 136–145)
TRIGL SERPL-MCNC: 88 MG/DL (ref 30–150)
WBC # BLD AUTO: 4.97 K/UL (ref 3.9–12.7)

## 2022-09-09 PROCEDURE — 1160F RVW MEDS BY RX/DR IN RCRD: CPT | Mod: CPTII,S$GLB,, | Performed by: DERMATOLOGY

## 2022-09-09 PROCEDURE — 36415 COLL VENOUS BLD VENIPUNCTURE: CPT | Performed by: STUDENT IN AN ORGANIZED HEALTH CARE EDUCATION/TRAINING PROGRAM

## 2022-09-09 PROCEDURE — 99213 OFFICE O/P EST LOW 20 MIN: CPT | Mod: S$GLB,,, | Performed by: DERMATOLOGY

## 2022-09-09 PROCEDURE — 1159F PR MEDICATION LIST DOCUMENTED IN MEDICAL RECORD: ICD-10-PCS | Mod: CPTII,S$GLB,, | Performed by: DERMATOLOGY

## 2022-09-09 PROCEDURE — 1160F PR REVIEW ALL MEDS BY PRESCRIBER/CLIN PHARMACIST DOCUMENTED: ICD-10-PCS | Mod: CPTII,S$GLB,, | Performed by: DERMATOLOGY

## 2022-09-09 PROCEDURE — 80061 LIPID PANEL: CPT | Performed by: STUDENT IN AN ORGANIZED HEALTH CARE EDUCATION/TRAINING PROGRAM

## 2022-09-09 PROCEDURE — 85025 COMPLETE CBC W/AUTO DIFF WBC: CPT | Performed by: STUDENT IN AN ORGANIZED HEALTH CARE EDUCATION/TRAINING PROGRAM

## 2022-09-09 PROCEDURE — 1159F MED LIST DOCD IN RCRD: CPT | Mod: CPTII,S$GLB,, | Performed by: DERMATOLOGY

## 2022-09-09 PROCEDURE — 99213 PR OFFICE/OUTPT VISIT, EST, LEVL III, 20-29 MIN: ICD-10-PCS | Mod: S$GLB,,, | Performed by: DERMATOLOGY

## 2022-09-09 PROCEDURE — 80053 COMPREHEN METABOLIC PANEL: CPT | Performed by: STUDENT IN AN ORGANIZED HEALTH CARE EDUCATION/TRAINING PROGRAM

## 2022-09-09 PROCEDURE — 99999 PR PBB SHADOW E&M-EST. PATIENT-LVL II: CPT | Mod: PBBFAC,,, | Performed by: DERMATOLOGY

## 2022-09-09 PROCEDURE — 99999 PR PBB SHADOW E&M-EST. PATIENT-LVL II: ICD-10-PCS | Mod: PBBFAC,,, | Performed by: DERMATOLOGY

## 2022-09-09 NOTE — PROGRESS NOTES
Subjective:       Patient ID:  Darek Mcnair is a 47 y.o. male who presents for   Chief Complaint   Patient presents with    Skin Check     TBSE     History of Present Illness: The patient presents for follow up of skin check.    The patient was last seen by Dr. Dietz for bx of left temple (SCC) which was tx by Dr. Burgess on 6/2.    This is a high risk patient here to check for the development of new lesions.    No specific concerns today.         Review of Systems   Skin:  Positive for daily sunscreen use, activity-related sunscreen use and wears hat (most). Negative for recent sunburn.   Hematologic/Lymphatic: Does not bruise/bleed easily.      Objective:    Physical Exam   Constitutional: He appears well-developed and well-nourished. No distress.   Neurological: He is alert and oriented to person, place, and time. He is not disoriented.   Psychiatric: He has a normal mood and affect.   Skin:   Areas Examined (abnormalities noted in diagram):   Scalp / Hair Palpated and Inspected  Head / Face Inspection Performed  Neck Inspection Performed  Chest / Axilla Inspection Performed  Back Inspection Performed  RUE Inspected  LUE Inspection Performed                 Diagram Legend     Erythematous scaling macule/papule c/w actinic keratosis       Vascular papule c/w angioma      Pigmented verrucoid papule/plaque c/w seborrheic keratosis      Yellow umbilicated papule c/w sebaceous hyperplasia      Irregularly shaped tan macule c/w lentigo     1-2 mm smooth white papules consistent with Milia      Movable subcutaneous cyst with punctum c/w epidermal inclusion cyst      Subcutaneous movable cyst c/w pilar cyst      Firm pink to brown papule c/w dermatofibroma      Pedunculated fleshy papule(s) c/w skin tag(s)      Evenly pigmented macule c/w junctional nevus     Mildly variegated pigmented, slightly irregular-bordered macule c/w mildly atypical nevus      Flesh colored to evenly pigmented papule c/w intradermal nevus        Pink pearly papule/plaque c/w basal cell carcinoma      Erythematous hyperkeratotic cursted plaque c/w SCC      Surgical scar with no sign of skin cancer recurrence      Open and closed comedones      Inflammatory papules and pustules      Verrucoid papule consistent consistent with wart     Erythematous eczematous patches and plaques     Dystrophic onycholytic nail with subungual debris c/w onychomycosis     Umbilicated papule    Erythematous-base heme-crusted tan verrucoid plaque consistent with inflamed seborrheic keratosis     Erythematous Silvery Scaling Plaque c/w Psoriasis     See annotation      Assessment / Plan:        SK (seborrheic keratosis)  These are benign inherited growths without a malignant potential. Reassurance given to patient. No treatment is necessary.       Sebaceous gland hyperplasia  This is a common condition representing benign enlargement of the sebaceous lobule. It typically occurs in adulthood. Reassurance given to patient.       Angioma of skin  This is a benign vascular lesion. Reassurance given. No treatment required.       Lentigo  This is a benign hyperpigmented sun induced lesion. Recommend daily sun protection/avoidance and use of at least SPF 30, broad spectrum sunscreen (OTC drug) will reduce the number of new lesions. Treatment of these benign lesions are considered cosmetic.    History of nonmelanoma skin cancer  Area(s) of previous NMSC evaluated with no signs of recurrence.    Upper body skin examination performed today including at least 6 points as noted in physical examination. No lesions suspicious for malignancy noted.    Recommend daily sun protection/avoidance and use of at least SPF 30, broad spectrum sunscreen (OTC drug).       EIC (epidermal inclusion cyst)  Reassurance given to patient. No treatment is necessary.              No follow-ups on file.

## 2022-09-22 LAB — NONINV COLON CA DNA+OCC BLD SCRN STL QL: NEGATIVE

## 2022-09-26 DIAGNOSIS — H35.052 CHOROID NEOVASCULARIZATION, LEFT: Primary | ICD-10-CM

## 2022-09-26 RX ORDER — FLUORESCEIN 500 MG/ML
5 INJECTION INTRAVENOUS ONCE
Status: SHIPPED | OUTPATIENT
Start: 2022-09-26

## 2023-01-11 ENCOUNTER — IMMUNIZATION (OUTPATIENT)
Dept: INTERNAL MEDICINE | Facility: CLINIC | Age: 48
End: 2023-01-11
Payer: COMMERCIAL

## 2023-01-11 DIAGNOSIS — Z23 NEED FOR VACCINATION: Primary | ICD-10-CM

## 2023-01-11 PROCEDURE — 91312 COVID-19, MRNA, LNP-S, BIVALENT BOOSTER, PF, 30 MCG/0.3 ML DOSE: CPT | Mod: S$GLB,,, | Performed by: INTERNAL MEDICINE

## 2023-01-11 PROCEDURE — 0124A COVID-19, MRNA, LNP-S, BIVALENT BOOSTER, PF, 30 MCG/0.3 ML DOSE: CPT | Mod: CV19,PBBFAC | Performed by: INTERNAL MEDICINE

## 2023-01-11 PROCEDURE — 91312 COVID-19, MRNA, LNP-S, BIVALENT BOOSTER, PF, 30 MCG/0.3 ML DOSE: ICD-10-PCS | Mod: S$GLB,,, | Performed by: INTERNAL MEDICINE

## 2023-03-08 ENCOUNTER — OFFICE VISIT (OUTPATIENT)
Dept: INTERNAL MEDICINE | Facility: CLINIC | Age: 48
End: 2023-03-08
Payer: COMMERCIAL

## 2023-03-08 VITALS
SYSTOLIC BLOOD PRESSURE: 118 MMHG | OXYGEN SATURATION: 98 % | WEIGHT: 217.13 LBS | HEART RATE: 72 BPM | HEIGHT: 77 IN | DIASTOLIC BLOOD PRESSURE: 76 MMHG | BODY MASS INDEX: 25.64 KG/M2

## 2023-03-08 DIAGNOSIS — G89.29 CHRONIC LEFT-SIDED LOW BACK PAIN WITHOUT SCIATICA: Primary | ICD-10-CM

## 2023-03-08 DIAGNOSIS — M54.50 CHRONIC LEFT-SIDED LOW BACK PAIN WITHOUT SCIATICA: Primary | ICD-10-CM

## 2023-03-08 PROCEDURE — 3008F PR BODY MASS INDEX (BMI) DOCUMENTED: ICD-10-PCS | Mod: CPTII,S$GLB,, | Performed by: STUDENT IN AN ORGANIZED HEALTH CARE EDUCATION/TRAINING PROGRAM

## 2023-03-08 PROCEDURE — 99999 PR PBB SHADOW E&M-EST. PATIENT-LVL IV: CPT | Mod: PBBFAC,,, | Performed by: STUDENT IN AN ORGANIZED HEALTH CARE EDUCATION/TRAINING PROGRAM

## 2023-03-08 PROCEDURE — 99213 PR OFFICE/OUTPT VISIT, EST, LEVL III, 20-29 MIN: ICD-10-PCS | Mod: S$GLB,,, | Performed by: STUDENT IN AN ORGANIZED HEALTH CARE EDUCATION/TRAINING PROGRAM

## 2023-03-08 PROCEDURE — 1159F PR MEDICATION LIST DOCUMENTED IN MEDICAL RECORD: ICD-10-PCS | Mod: CPTII,S$GLB,, | Performed by: STUDENT IN AN ORGANIZED HEALTH CARE EDUCATION/TRAINING PROGRAM

## 2023-03-08 PROCEDURE — 1159F MED LIST DOCD IN RCRD: CPT | Mod: CPTII,S$GLB,, | Performed by: STUDENT IN AN ORGANIZED HEALTH CARE EDUCATION/TRAINING PROGRAM

## 2023-03-08 PROCEDURE — 99999 PR PBB SHADOW E&M-EST. PATIENT-LVL IV: ICD-10-PCS | Mod: PBBFAC,,, | Performed by: STUDENT IN AN ORGANIZED HEALTH CARE EDUCATION/TRAINING PROGRAM

## 2023-03-08 PROCEDURE — 1160F PR REVIEW ALL MEDS BY PRESCRIBER/CLIN PHARMACIST DOCUMENTED: ICD-10-PCS | Mod: CPTII,S$GLB,, | Performed by: STUDENT IN AN ORGANIZED HEALTH CARE EDUCATION/TRAINING PROGRAM

## 2023-03-08 PROCEDURE — 3078F DIAST BP <80 MM HG: CPT | Mod: CPTII,S$GLB,, | Performed by: STUDENT IN AN ORGANIZED HEALTH CARE EDUCATION/TRAINING PROGRAM

## 2023-03-08 PROCEDURE — 3078F PR MOST RECENT DIASTOLIC BLOOD PRESSURE < 80 MM HG: ICD-10-PCS | Mod: CPTII,S$GLB,, | Performed by: STUDENT IN AN ORGANIZED HEALTH CARE EDUCATION/TRAINING PROGRAM

## 2023-03-08 PROCEDURE — 3074F SYST BP LT 130 MM HG: CPT | Mod: CPTII,S$GLB,, | Performed by: STUDENT IN AN ORGANIZED HEALTH CARE EDUCATION/TRAINING PROGRAM

## 2023-03-08 PROCEDURE — 1160F RVW MEDS BY RX/DR IN RCRD: CPT | Mod: CPTII,S$GLB,, | Performed by: STUDENT IN AN ORGANIZED HEALTH CARE EDUCATION/TRAINING PROGRAM

## 2023-03-08 PROCEDURE — 3008F BODY MASS INDEX DOCD: CPT | Mod: CPTII,S$GLB,, | Performed by: STUDENT IN AN ORGANIZED HEALTH CARE EDUCATION/TRAINING PROGRAM

## 2023-03-08 PROCEDURE — 3074F PR MOST RECENT SYSTOLIC BLOOD PRESSURE < 130 MM HG: ICD-10-PCS | Mod: CPTII,S$GLB,, | Performed by: STUDENT IN AN ORGANIZED HEALTH CARE EDUCATION/TRAINING PROGRAM

## 2023-03-08 PROCEDURE — 99213 OFFICE O/P EST LOW 20 MIN: CPT | Mod: S$GLB,,, | Performed by: STUDENT IN AN ORGANIZED HEALTH CARE EDUCATION/TRAINING PROGRAM

## 2023-03-08 RX ORDER — TIZANIDINE 2 MG/1
2 TABLET ORAL EVERY 12 HOURS PRN
Qty: 28 TABLET | Refills: 0 | Status: SHIPPED | OUTPATIENT
Start: 2023-03-08 | End: 2023-03-22

## 2023-03-08 NOTE — PATIENT INSTRUCTIONS
You were seen today for lower back pain     After today's visit:    tizanidine (Zanaflex) from pharmacy and start tonight   Take Zanaflex along with tylenol (650mg) each night for the next 2 weeks  Take Aleve (naproxen) twice a day as needed for the next 1 week   Continue stretching each morning before work and each evening before bed  Keep appointment with physical therapy. ([facility])

## 2023-03-08 NOTE — PROGRESS NOTES
Clinic Note  3/8/2023      Subjective:       Patient ID:  AZUCENA is a 47 y.o. male being seen for an established visit.    Chief Complaint: No chief complaint on file.    Mr. Mcnair is a 47 M with PMHx of insomnia, SSC of skin, nephrolithiasis who presents to clinic for reoccurring back pain. Last discussed with me in clinic during annual exam on 9/22. Back pain is worsened with standing, twisting and improved with sitting with support against back of chair or certain positions while sitting. Denies change  in bladder/blower function, trauma to back, prior surgeries, radiation of pain/tightness, atrophy of muscles in lower extremities.     Back Pain  This is a recurrent problem. The current episode started more than 1 month ago. The problem occurs every several days. The problem is unchanged. The pain is present in the costovertebral angle. The quality of the pain is described as aching and stabbing. The pain does not radiate. The pain is at a severity of 7/10. The pain is moderate. The pain is Worse during the night. The symptoms are aggravated by lying down, standing and twisting. Stiffness is present In the morning. Pertinent negatives include no bladder incontinence, bowel incontinence, dysuria, leg pain, paresis, paresthesias, pelvic pain, perianal numbness, tingling or weight loss. The treatment provided mild relief.     Review of Systems   Constitutional:  Negative for weight loss.   Gastrointestinal:  Negative for bowel incontinence.   Genitourinary:  Negative for bladder incontinence, dysuria, hematuria and pelvic pain.   Musculoskeletal:  Positive for back pain.   Neurological:  Negative for tingling and paresthesias.     Past Medical History:   Diagnosis Date    Squamous cell carcinoma of skin        Family History   Problem Relation Age of Onset    Heart disease Maternal Aunt     Heart disease Maternal Uncle     Heart disease Maternal Grandmother     Heart disease Maternal Grandfather         reports that  "he has quit smoking. His smoking use included cigarettes. He has never used smokeless tobacco. He reports current alcohol use of about 2.0 standard drinks per week. He reports that he does not use drugs.    Medication List with Changes/Refills   New Medications    TIZANIDINE (ZANAFLEX) 2 MG TABLET    Take 1 tablet (2 mg total) by mouth every 12 (twelve) hours as needed.   Current Medications    MULTIVITAMIN (THERAGRAN) PER TABLET    Take 1 tablet by mouth once daily.    OMEGA-3 FATTY ACIDS/FISH OIL (FISH OIL-OMEGA-3 FATTY ACIDS) 300-1,000 MG CAPSULE    Take by mouth once daily.     Review of patient's allergies indicates:  No Known Allergies    Patient Active Problem List   Diagnosis    Strain of muscle(s) and tendon(s) of peroneal muscle group at lower leg level, right leg, initial encounter    Choroid neovascularization, left           Objective:      /76   Pulse 72   Ht 6' 5" (1.956 m)   Wt 98.5 kg (217 lb 2.5 oz)   SpO2 98%   BMI 25.75 kg/m²   Estimated body mass index is 25.75 kg/m² as calculated from the following:    Height as of this encounter: 6' 5" (1.956 m).    Weight as of this encounter: 98.5 kg (217 lb 2.5 oz).  Physical Exam  Constitutional:       General: He is not in acute distress.     Appearance: Normal appearance. He is not diaphoretic.   HENT:      Head: Normocephalic and atraumatic.      Nose: Nose normal.      Mouth/Throat:      Mouth: Mucous membranes are moist.   Eyes:      General: No scleral icterus.     Extraocular Movements: Extraocular movements intact.      Conjunctiva/sclera: Conjunctivae normal.   Neck:      Thyroid: No thyromegaly.   Cardiovascular:      Rate and Rhythm: Normal rate and regular rhythm.      Pulses: Normal pulses.      Heart sounds: Normal heart sounds.   Pulmonary:      Effort: Pulmonary effort is normal.      Breath sounds: Normal breath sounds. No wheezing or rales.   Abdominal:      General: Bowel sounds are normal. There is no distension.      " Palpations: Abdomen is soft. There is no mass.      Tenderness: There is no abdominal tenderness.   Musculoskeletal:         General: Normal range of motion.      Cervical back: Normal and normal range of motion.      Thoracic back: Normal.      Lumbar back: Tenderness present. No swelling, edema, deformity, lacerations or bony tenderness. Normal range of motion.      Right lower leg: No edema.      Left lower leg: No edema.      Comments: TTP paravertebral area of L lumbar spine.    Skin:     General: Skin is warm and dry.      Findings: No rash.   Neurological:      General: No focal deficit present.      Mental Status: He is alert and oriented to person, place, and time.   Psychiatric:         Mood and Affect: Mood and affect normal.         Behavior: Behavior normal.         Thought Content: Thought content normal.         Judgment: Judgment normal.         Assessment and Plan:   47 M with chronic back pain without warning signs       Diagnoses and all orders for this visit:    Chronic left-sided low back pain without sciatica  -     Ambulatory referral/consult to [facility]; Future  -     tiZANidine (ZANAFLEX) 2 MG tablet; Take 1 tablet (2 mg total) by mouth every 12 (twelve) hours as needed.  - Continue home stretching exercises each morning and at night before bed.   -naproxen for 1 week bid  -tylenol along with zanaflex for 2 weeks qhs  -warm compressions         No follow-ups on file.    Other Orders Placed This Visit:  Orders Placed This Encounter   Procedures    Ambulatory referral/consult to [facility] Brent C Ruiz, MD  Internal Medicine, PGY-3    Discussed with       Answers submitted by the patient for this visit:  Back Pain Questionnaire (Submitted on 3/8/2023)  Chief Complaint: Back pain  genital pain: No

## 2023-03-08 NOTE — PROGRESS NOTES
Reviewed documentation of history, physical, assessment and plan.  The documentation agrees with my discussion with the resident.

## 2023-03-27 NOTE — PROGRESS NOTES
Subjective:      Patient ID: Darek Mcnair is a 47 y.o. male.    Chief Complaint: No chief complaint on file.    Referred by: Bimal Olson MD     Mr Mcnair is a 48 yo male sent in consultation by Dr. Olson  for evaluation for the healthy back lumbar program.  he has had lumbar pain for 1.5 years ago he went fishing and started having back pain.  The pain is left lower back dominant.  The pain is a discomfort and a tightness but can be jabbing.  No tingling numbness burning or weakness.  The pain is intermittent 0-7/10.  It is worse with sitting, reclining or laying with pressure on the spot, lying in back sometimes.  It is better in morning, not sitting with something against the back, walking and standing. He has not had any treatment. He tries to stretch and that helps.  His goals are sitting longer, lying down or recline, sleeping on back. Pattern 1      Past Medical History:  No date: Squamous cell carcinoma of skin    Past Surgical History:  No date: EYE SURGERY  No date: WISDOM TOOTH EXTRACTION    Review of patient's family history indicates:      Social History    Socioeconomic History      Marital status:     Tobacco Use      Smoking status: Former        Years: 5.00        Types: Cigarettes      Smokeless tobacco: Never      Tobacco comments: 5 cigs/day for 5 years    Substance and Sexual Activity      Alcohol use: Yes        Alcohol/week: 2.0 standard drinks        Types: 2 Cans of beer per week        Comment: social      Drug use: No      Sexual activity: Yes      Current Outpatient Medications:  multivitamin (THERAGRAN) per tablet, Take 1 tablet by mouth once daily., Disp: , Rfl:   omega-3 fatty acids/fish oil (FISH OIL-OMEGA-3 FATTY ACIDS) 300-1,000 mg capsule, Take by mouth once daily., Disp: , Rfl:     Current Facility-Administered Medications:  fluorescein 500 mg/5 mL (10 %) injection 500 mg, 5 mL, Intravenous, Once, D. Madi Garcia MD        Review of patient's allergies  indicates:  No Known Allergies          Review of Systems   Constitutional: Negative for weight gain and weight loss.   Cardiovascular:  Negative for chest pain.   Respiratory:  Negative for shortness of breath.    Musculoskeletal:  Positive for back pain. Negative for joint pain and joint swelling.   Gastrointestinal:  Negative for abdominal pain, bowel incontinence, nausea and vomiting.   Genitourinary:  Negative for bladder incontinence.   Neurological:  Negative for numbness and paresthesias.         Objective:          General    Vitals reviewed.  Constitutional: He is oriented to person, place, and time. He appears well-developed and well-nourished.   HENT:   Head: Normocephalic and atraumatic.   Pulmonary/Chest: Effort normal.   Neurological: He is alert and oriented to person, place, and time.   Psychiatric: He has a normal mood and affect. His behavior is normal. Judgment and thought content normal.     General Musculoskeletal Exam   Gait: normal     Right Ankle/Foot Exam     Tests   Heel Walk: able to perform  Tiptoe Walk: able to perform    Left Ankle/Foot Exam     Tests   Heel Walk: able to perform  Tiptoe Walk: able to perform  Back (L-Spine & T-Spine) / Neck (C-Spine) Exam     Tenderness Left paramedian tenderness of the Lower T-Spine and Upper L-Spine.     Back (L-Spine & T-Spine) Range of Motion   Extension:  30   Flexion:  90   Lateral bend right:  20   Lateral bend left:  20   Rotation right:  40   Rotation left:  40     Spinal Sensation   Right Side Sensation  C-Spine Level: normal   L-Spine Level: normal  S-Spine Level: normal  Left Side Sensation  C-Spine Level: normal  L-Spine Level: normal  S-Spine Level: normal    Back (L-Spine & T-Spine) Tests   Right Side Tests  Straight leg raise:        Sitting SLR: > 70 degrees    Left Side Tests  Straight leg raise:       Sitting SLR: > 70 degrees      Other   He has no scoliosis .  Spinal Kyphosis:  Absent    Comments:  Neg LATONYA      Muscle Strength    Right Upper Extremity   Biceps: 5/5   Deltoid:  5/5  Triceps:  5/5  Wrist extension: 5/5   Finger Flexors:  5/5  Left Upper Extremity  Biceps: 5/5   Deltoid:  5/5  Triceps:  5/5  Wrist extension: 5/5   Finger Flexors:  5/5  Right Lower Extremity   Hip Flexion: 5/5   Quadriceps:  5/5   Anterior tibial:  5/5   EHL:  5/5  Left Lower Extremity   Hip Flexion: 5/5   Quadriceps:  5/5   Anterior tibial:  5/5   EHL:  5/5    Reflexes     Left Side  Biceps:  2+  Triceps:  2+  Brachioradialis:  2+  Achilles:  2+  Left Lee's Sign:  Absent  Babinski Sign:  absent  Quadriceps:  2+    Right Side   Biceps:  2+  Triceps:  2+  Brachioradialis:  2+  Achilles:  2+  Right Lee's Sign:  absent  Babinski Sign:  absent  Quadriceps:  2+    Vascular Exam     Right Pulses        Carotid:                  2+    Left Pulses        Carotid:                  2+      Assessment:       Encounter Diagnosis   Name Primary?    Chronic left-sided low back pain without sciatica          Plan:       Diagnoses and all orders for this visit:    Chronic left-sided low back pain without sciatica  -     Ambulatory referral/consult to [facility]  -     Ambulatory referral/consult to [facility]; Future       The patient has had a history of low back pain with limitations in there activities of Daily living    Previous treatment has not provided relief.    The situation was discussed at length with the patient.  We discussed different causes of back pain and different treatment options.  We discussed the importance of stretching and strengthening.  We discussed posture.  We discussed the pros and cons of further diagnostic testing, alternative treatment and Medications    Based on the history, physical exam, and functional index, an active physical therapy program is recommended.  The goal is to restore the patients function and reduce pain.  A program of progressive resistance exercises, biomechanical, and mobility maneuvers,  instructions in proper body mechanics, aerobic conditioning and HEP will be utilized. The program will continue as long as making improvements.    An assessment of patients progress will be made at each visit to document change in status.    The patient will be actively involved in their own treatment, and responsible for appointments and home program    The patient's lumbar isometric strength will be tested and they will be placed in a program of isolated strength training based on 50% of their total functional torque and advanced as clinically appropriate.      Directional preference of pain will further influence the patients active rehabilitation program    The patient was instructed there might be an initial increase in discomfort    They are enrolled with good prognosis  Pattern 1

## 2023-03-29 ENCOUNTER — CLINICAL SUPPORT (OUTPATIENT)
Dept: REHABILITATION | Facility: HOSPITAL | Age: 48
End: 2023-03-29
Payer: COMMERCIAL

## 2023-03-29 ENCOUNTER — CLINICAL SUPPORT (OUTPATIENT)
Dept: REHABILITATION | Facility: HOSPITAL | Age: 48
End: 2023-03-29
Attending: PHYSICAL MEDICINE & REHABILITATION
Payer: COMMERCIAL

## 2023-03-29 VITALS
SYSTOLIC BLOOD PRESSURE: 110 MMHG | DIASTOLIC BLOOD PRESSURE: 70 MMHG | BODY MASS INDEX: 25.62 KG/M2 | HEIGHT: 77 IN | WEIGHT: 217 LBS | HEART RATE: 74 BPM

## 2023-03-29 DIAGNOSIS — G89.29 CHRONIC LEFT-SIDED LOW BACK PAIN WITHOUT SCIATICA: ICD-10-CM

## 2023-03-29 DIAGNOSIS — M54.50 CHRONIC LEFT-SIDED LOW BACK PAIN WITHOUT SCIATICA: ICD-10-CM

## 2023-03-29 PROCEDURE — 97750 PHYSICAL PERFORMANCE TEST: CPT | Mod: 32 | Performed by: PHYSICAL MEDICINE & REHABILITATION

## 2023-03-29 RX ORDER — ZOLPIDEM TARTRATE 5 MG/1
5 TABLET ORAL NIGHTLY PRN
COMMUNITY

## 2023-03-29 NOTE — PROGRESS NOTES
Health  met with patient to complete initial outcomes for the [facility] lumbar program.  Questions were reviewed with patient and discussed with Dr. Kilgore. The patient will meet with Dr. Kilgore to determine program enrollment.     [facility] Questionnaire  3/28/2023   Please select the location of your worst pain:  Mid back (below shoulder blades)   Please select the location of any additional pain: (hold down the control key, and click to select multiple responses) Mid back- Below shoulder blades    Did the pain begin after an event, injury, or accident? Yes   If yes, please describe:  Fishing in the [facility] with 8 foot waves   How long has this pain been going on?  1.5 years off and on   Please indicate how the pain is changing.  Worsening   What is the WORST level of pain that you have experienced in the last week?  7   What is the LEAST level of pain that you have experienced in the past week?  1   If you have any comments about your pain level, please provide below:  More of an uncomfortable feeling than anything else.   What can you NOT do not that you used to be able to do?  Nothing- able to still do all that I want to do.   Are your limitations mainly due to your pain?  No   What are your additional complaints, if any? None   Is there ever a time during the day when your pain stops, even for a brief moment, before returning? Yes   If yes, when your pain stops, does it disappear completely? Is it totally gone? No   Does bending forward make your typical pain worse? No   Morning: Better during   Afternoon: Worse during   Evening:  Same   Nighttime: Same   Standing:  Better   Lying on stomach: Better   Lying on back: Same   Sitting:  Worse   Walking: Better   Climbing stairs: Better   Emergency department  No   Health care providers (hold down the control key, and click to select multiple responses) Family doctor   Investigations done (hold down the control key, and click to select multiple  responses) None   Procedures (hold down the control key, and click to select multiple responses) None   Have you had any surgery on your back?  No   Please abraham what you are experiencing. 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 Advanced/graduate   What is your work status? Employed   How did you hear about the [facility] program?  Physician

## 2023-04-05 ENCOUNTER — CLINICAL SUPPORT (OUTPATIENT)
Dept: REHABILITATION | Facility: HOSPITAL | Age: 48
End: 2023-04-05
Payer: COMMERCIAL

## 2023-04-05 DIAGNOSIS — R29.898 DECREASED STRENGTH OF TRUNK AND BACK: ICD-10-CM

## 2023-04-05 DIAGNOSIS — G89.29 CHRONIC LEFT-SIDED LOW BACK PAIN WITHOUT SCIATICA: ICD-10-CM

## 2023-04-05 DIAGNOSIS — M54.50 CHRONIC LEFT-SIDED LOW BACK PAIN WITHOUT SCIATICA: ICD-10-CM

## 2023-04-05 DIAGNOSIS — R29.3 POOR POSTURE: ICD-10-CM

## 2023-04-05 PROCEDURE — 97750 PHYSICAL PERFORMANCE TEST: CPT | Mod: 32

## 2023-04-06 PROBLEM — R29.898 DECREASED STRENGTH OF TRUNK AND BACK: Status: ACTIVE | Noted: 2023-04-06

## 2023-04-06 PROBLEM — R29.3 POOR POSTURE: Status: ACTIVE | Noted: 2023-04-06

## 2023-04-06 NOTE — PLAN OF CARE
[facility] - PHYSICAL THERAPY LUMBAR EVALUATION     Name: Darek Mcnair  Clinic Number: [id number]    Therapy Diagnosis:   Encounter Diagnoses   Name Primary?    Chronic left-sided low back pain without sciatica     Poor posture     Decreased strength of trunk and back      Physician: Cahta Kilgore, *    Physician Orders: PT Eval and Treat   Medical Diagnosis from Referral: M54.50,G89.29 (ICD-10-CM) - Chronic left-sided low back pain without sciatica  Evaluation Date: 4/5/2023  Authorization Period Expiration: 12/31/23  Plan of Care Expiration: 7/5/23  Reassessment Due: 5/5/23  Visit # / Visits authorized: 1/ 20    Time In: 150  Time Out: 310  Total Billable Time: 80 minutes  Insurance:Healthy Back Benefit Patient      Precautions: Standard    Pattern of pain determined: 1PEN      Subjective   Date of onset: 1 1/2 years  History of current condition - JJ reports: Pain first began 1 .5 years ago after going on a charter fishing trip.  Pt states water was very choppy and approx 1-2 days later he began experiencing LLB pain. Currently pain remains LLB dominant without LE involvement. Within the last several months pain has increased to daily.  Pt reports there is always a tightening discomfort in his back. Pain can become stabbing when laying down or changing positions.           Worse: sitting / AM/driving  Better standing/ice       Pain can be pressure/achy.  LBP is intermittent  L LB     Medical History:   Past Medical History:   Diagnosis Date    Squamous cell carcinoma of skin      As per MD:Mr Mcnair is a 48 yo male sent in consultation by Dr. Olson  for evaluation for the healthy back lumbar program.  he has had lumbar pain for 1.5 years ago he went fishing and started having back pain.  The pain is left lower back dominant.  The pain is a discomfort and a tightness but can be jabbing.  No tingling numbness burning or weakness.  The pain is intermittent 0-7/10.  It is worse with sitting,  "reclining or laying with pressure on the spot, lying in back sometimes.  It is better in morning, not sitting with something against the back, walking and standing. He has not had any treatment. He tries to stretch and that helps.  His goals are sitting longer, lying down or recline, sleeping on back. Pattern 1     Surgical History:   Darek Mcnair  has a past surgical history that includes Eye surgery and Adin tooth extraction.    Medications:   Darek has a current medication list which includes the following prescription(s): multivitamin, fish oil-omega-3 fatty acids, and zolpidem, and the following Facility-Administered Medications: fluorescein.    Allergies:   Review of patient's allergies indicates:  No Known Allergies     Imaging, : none    Prior Therapy: none  Prior Treatment: none  Social History:  lives with their spouse  Occupation: Works in [employer] tech, sitting and standing  Leisure: fish/running /golf     Prior Level of Function: I  Current Level of Function: I c/ pain  DME owned/used: none        Pain:  Current 1/10, worst 3/10, best 1/10   Location: left back   Description: Aching and Dull  Aggravating Factors: Sitting, Morning, and Getting out of bed/chair/laying on back  Easing Factors: ice  Disturbed Sleep: yes        Pattern of pain questions:  1.  Where is your pain the worst? LB  2.  Is your pain constant or intermittent? intermittent  3.  Does bending forward make your typical pain worse? no  4.  Since the start of your back pain, has there been a change in your bowel or bladder? no  5.  What can't you do now that you use to be able to do? I      Pts goals: "Get rid of pain"      Red Flag Screening:   Cough  Sneeze  Strain: (--)  Bladder/ bowel: (--)  Falls: (--)  Night pain: (--)  Unexplained weight loss: (--)  General health: good    OBJECTIVE     Postural examination/scapula alignment: Rounded shoulder, Head forward, Increased kyphosis, and Decreased lordosis  Joint integrity: " Firm end feeling  Skin integrity: Intact B LE's  Edema: none  Sitting: poor  Standing: poor  Correction of posture: better with lumbar roll      MOVEMENT LOSS Back   Norms ROM Loss Initial   Flexion Fingers touch toes, sacral angle >/= 70 deg, uniform spinal curvature, posterior weight shift  minimal loss   Extension ASIS surpasses toes, spine of scapulae surpasses heels, uniform spinal curve minimal loss   Side glide Right  minimal loss   Side glide Left  minimal loss   Rotation Right PT observes contralateral shoulder minimal loss   Rotation Left PT observes contralateral shoulder minimal loss       Lower Extremity Strength  5/5  GAIT:  Assistive Device used: none  Level of Assistance: independent  Patient displays the following gait deviations:  no gait deviations observed.     Special Tests:   Test Name  Test Result   Prone Instability Test (--)   SI Joint Provocation Test (--)   Straight Leg Raise (--)   Neural Tension Test (--)   Crossed Straight Leg Raise (--)   Walking on toes (--)   Walking on heels  (--)   TTP Left quadratus     NEUROLOGICAL SCREENING     Sensory deficit: Intact B LE's    Reflexes:    Left Right   Patella Tendon 1+ 1+   Achilles Tendon 1+ 1+   Babinski  N/T N/T   Clonus (--) (--)     REPEATED TEST MOVEMENTS:    Baseline symptoms:  Repeated Flexion in Standing No pain , inc tightness   Repeated Extension in Standing no worse   Repeated Flexion in lying worse   Repeated Extension in lying  worse       STATIC TESTS and other movements:   Baseline symptoms:  Prone lie better   Prone lie on elbows no worse   Sustained prone with  using mat N/T   Sustained flexion worse   Sitting slouched  worse   Sitting erect better   Standing slouched worse   Standing erect  better   Lying prone in extension  no worse   Long sitting   N/T   Other tests Repeated Test Movements:[id number]}       Baseline Isometric Testing on Med X equipment: Testing administered by PT  Date of testin23  ROM 6-54 deg   Max  Peak Torque 226    Min Peak Torque 119    Flex/Ext Ratio 1.8:1   % below normative data 16         Limitation/Restriction for FOTO Lumbar Survey    Therapist reviewed FOTO scores for Darek Mcnair on 4/5/2023.   FOTO documents entered into [employer] - see Media section.    Limitation Score: 33%             Treatment   Treatment Time In: 240  Treatment Time Out: 310  Total Treatment time separate from Evaluation: 30 minutes      Darek received therapeutic exercises to develop/improve posture, lumbar/cervical ROM, strength and muscular endurance for 15 minutes including the following exercises:   Standing quadratus stretch  LTR  EIS  Add Open book/  Darek received neuromuscular education  to engage spinal musculature correctly for motor control, and engagement of musculature  for 15 minutes including the MedX exercise component and practice and standard testing.  Med x dynamic exercise and baseline IM test performed with instructions to guide the patient safely through the isometric testing.  Patient informed to perform isometric test correctly, and safely, building best force they safely can and not pushing through pain.  Patient demonstrated understanding of information   [facility] 4/5/2023   Visit Number 1   VAS Pain Rating 1   Femur Restraint 6   Top Dead Center 24   Counterweight 382   Lumbar Flexion 54   Lumbar Extension 6   Lumbar Peak Torque 226   Min Torque 119   Test Percent Below Normative Data 16   Lumbar Weight 60   Repetitions 6   Ice - Z Lie (in min.) 10       Written Home Exercises Provided: yes.  HEP AS FOLLOWS:  Standing quadratus stretch  LTR  EIS  Exercises were reviewed and AZUCENA was able to demonstrate them prior to the end of the session.  JJ demonstrated good  understanding of the education provided.     See EMR under Patient Instructions for exercises provided 4/5/2023.      Education provided:   - Patient received education regarding proper posture and body mechanics.  Patient was given  top [facility] Visit 1 handouts which discuss what to expect in therapy, the purpose and opportunity for health coaching, the program,  wellness when discharged from therapy, back education and care specifically for posture seated, standing, lifting correctly, components of exercise, importance of nutrition and hydration, and importance of sleep.   Information on lumbar rolls provided.  - Jazmin roll tried, recommended, and purchase information was provided.    - Patient received a handout regarding anticipated muscular soreness following the isometric test and strategies for management were reviewed with patient including stretching, using ice and scheduled rest.   - Patient received education on the Healthy Back program, purpose of the isometric test, progression of back strengthening as well as wellness approach and systemic strengthening.  Details of the program were discussed.  Reviewed that patient should feel support/pressure from med ex restraints but no pain or discomfort and patient expressed understanding.  Med x dynamic exercise and baseline IM test performed with instructions to guide the patient safely through the isometric testing.  Patient informed to perform isometric test correctly, and safely, building best force they safely can and not pushing through pain.  Patient demonstrated understanding of information      JJ received cold pack for 10 minutes to LB.    Assessment   Darek is a 47 y.o. male referred to [facility] with a medical diagnosis of Chronic left-sided low back pain without sciatica. Pt presents with 1% strength deficit as compared to norms, 33% FOTO disability score, decreased trunk ROM, and poor posture . Upon examination pt noted to have TTP of L quadratus along with increased muscle tone. Pt given stretches to address tightness and also discussed being aware of postural deviations at work which may be increasing tightness. Pt will benefit from core/peripheral  strengthening along with postural re education.    Pain Pattern: 1PEN       Pt prognosis is Excellent.   Pt will benefit from skilled outpatient Physical Therapy to address the deficits stated above and in the chart below, provide pt/family education, and to maximize pt's level of independence. Based on the above history and physical examination an active physical therapy program is recommended.  Pt will continue to benefit from skilled outpatient physical therapy to address the deficits listed below in the chart, provide pt/family education and to maximize pt's level of independence in the home and community environment. .       Plan of care discussed with patient: Yes  Pt's spiritual, cultural and educational needs considered and patient is agreeable to the plan of care and goals as stated below:     Anticipated Barriers for therapy: scheduling    PT Evaluation Completed? Yes    Medical necessity is demonstrated by the following problem list.    Pt presents with the following impairments:     History  Co-morbidities and personal factors that may impact the plan of care Co-morbidities:   none    Personal Factors:   lifestyle     low   Examination  Body Structures and Functions, activity limitations and participation restrictions that may impact the plan of care Body Regions:   back  trunk    Body Systems:    transitions  motor control  ROM  Gross symmetry  posture  Participation Restrictions:   schedule    Activity limitations:   Learning and applying knowledge  no deficits    General Tasks and Commands  no deficits    Communication  no deficits    Mobility  driving (bike, car, motorcycle)    Self care  no deficits    Domestic Life  Yard work    Interactions/Relationships  no deficits    Life Areas  no deficits    Community and Social Life  recreation and leisure         low   Clinical Presentation stable and uncomplicated low   Decision Making/ Complexity Score: low       GOALS: Pt is in agreement with the  following goals.    Short term goals:  6 weeks or 10 visits   1.  Pt will demonstrate increased lumbar ROM by at least 3 degrees from the initial ROM value with improvements noted in functional ROM and ability to perform ADLs.  (approp and ongoing)  2.  Pt will demonstrate increased MedX average isometric strength value  by 20% from initial test resulting in improved ability to perform bending, lifting, and carrying activities safely, confidently.  (approp and ongoing)  3.  Patient report a reduction in worst pain score by 1-2 points for improved tolerance for sitting.  (approp and ongoing)  4.  Pt able to perform HEP correctly with minimal cueing or supervision from therapist to encourage independent management of symptoms. (approp and ongoing)      Long term goals: 10 weeks or 20 visits   1. Pt will demonstrate increased lumbar ROM by at least 6 degrees from initial ROM value, resulting in improved ability to perform functional fwd bending while standing and sitting. (approp and ongoing)  2. Pt will demonstrate increased MedX average isometric strength value  by 40% from initial test resulting in improved ability to perform bending, lifting, and carrying activities safely, confidently.  (approp and ongoing)  3. Pt to demonstrate ability to independently control and reduce their pain through posture positioning and mechanical movements throughout a typical day.  (approp and ongoing)  4.  Pt will demonstrate reduced pain and improved functional outcomes as reported on the FOTO by reaching a limitation score of < or = 30% or less in order to demonstrate subjective improvement in pt's condition.    (approp and ongoing)  5. Pt will demonstrate independence with the HEP at discharge  (approp and ongoing)  6.  Pt(patient goal)will report no pain or stiffness with driving > 1 hour  (approp and ongoing)      Plan   Outpatient physical therapy 2x week for 10 weeks or 20 visits to include the following:   - Patient  "education  - Therapeutic exercise  - Manual therapy  - Performance testing   - Neuromuscular Re-education  - Therapeutic activity   - Modalities    Pt may be seen by PTA as part of the rehabilitation team.     Therapist: Mercedes Conteh, PT  4/6/2023    "I certify the need for these services furnished under this plan of treatment and while under my care."    ____________________________________  Physician/Referring Practitioner    _______________  Date of Signature          "

## 2023-06-07 ENCOUNTER — CLINICAL SUPPORT (OUTPATIENT)
Dept: REHABILITATION | Facility: HOSPITAL | Age: 48
End: 2023-06-07
Payer: COMMERCIAL

## 2023-06-07 DIAGNOSIS — R29.3 POOR POSTURE: Primary | ICD-10-CM

## 2023-06-07 DIAGNOSIS — R29.898 DECREASED STRENGTH OF TRUNK AND BACK: ICD-10-CM

## 2023-06-07 PROCEDURE — 97750 PHYSICAL PERFORMANCE TEST: CPT | Mod: 32

## 2023-06-07 NOTE — PROGRESS NOTES
"[facility] Physical Therapy Treatment      Name: Darek Mcnair  Clinic Number: [id number]    Therapy Diagnosis:   Encounter Diagnoses   Name Primary?    Poor posture Yes    Decreased strength of trunk and back      Physician: Chata Kilgore, *    Visit Date: 2023  Physician Orders: PT Eval and Treat   Medical Diagnosis from Referral: M54.50,G89.29 (ICD-10-CM) - Chronic left-sided low back pain without sciatica  Evaluation Date: 2023  Authorization Period Expiration: 23  Plan of Care Expiration: 23  Reassessment Due:23  Visit # / Visits authorized:     Time In: 755  Time Out: 845  Total Billable Time: 50 minutes  Insurance:Healthy Back Benefit Patient        Precautions: Standard     Pattern of pain determined: 1PEN       Subjective   Darek reports no change in symptoms.  Had some increase with EIS while doing HEP.      Patient reports tolerating previous visit with moderate DOMS  Patient reports their pain to be 1/10 on a 0-10 scale with 0 being no pain and 10 being the worst pain imaginable.  Pain Location: LLB/quadratus  Social History:  lives with their spouse  Occupation: Works in [employer] tech, sitting and standing  Leisure: fish/running /golf   Pts goals: "Get rid of pain"    Objective     Baseline Isometric Testing on Med X equipment: Testing administered by PT  Date of testin23  ROM 6-54 deg   Max Peak Torque 226    Min Peak Torque 119    Flex/Ext Ratio 1.8:1   % below normative data 16            Limitation/Restriction for FOTO Lumbar Survey     Therapist reviewed FOTO scores for Darek Mcnair on 2023.   FOTO documents entered into [employer] - see Media section.     Limitation Score: 33%         Treatment    AZUCENA received the treatments listed below:      AZUCENA received neuromuscular education  to isolate and engage spinal stabilization musculature correctly for motor control and coordination to aid in function and posture for 15 minutes on the Medical " Medx Machine.  Patient performed MedX dynamic exercise with emphasis on spinal muscular control using pacer throughout  active range of motion. Therapist assisted patient in achieving optimal exertion for neural reeducation and endurance training by using the  Cyndi Exertion Rating scale, by instructing the patient to aim for mid range of exertion, performing 15-20 repetitions, slowly, correctly,and safely.    [facility] 6/7/2023   Visit Number 2   VAS Pain Rating 1   Time 5   Extension in Standing 10   Manual Therapy 5   Femur Restraint -   Top Dead Center -   Counterweight -   Lumbar Flexion -   Lumbar Extension -   Lumbar Peak Torque -   Min Torque -   Test Percent Below Normative Data -   Lumbar Weight 85   Repetitions 15   Rating of Perceived Exertion 4.5   Ice - Z Lie (in min.) 5       therapeutic exercises to develop strength, endurance, ROM, flexibility, posture, and core stabilization for 45 minutes including:  Standing quadratus stretch stretching L 3 x 20 sec  LTR  Open book 10x  Seated extension with 1/2 roll 10x  EIS in modified ranges  Peripheral muscle strengthening which included 1 set of 15-20 repetitions at a slow, controlled 10-13 second per rep pace focused on strengthening supporting musculature for improved body mechanics and functional mobility.  Pt and therapist focused on proper form during treatment to ensure optimal strengthening of each targeted muscle group.  Machines were utilized including torso rotation, leg press, hip abd and hip add, leg ext.  Leg curl, triceps, biceps, chest and row added visit 3      manual therapy techniques: Friction Massage were applied to the: L LB /quadratus for 5 minutes  .     cold pack for 5 minutes to LB.    Home Exercises Provided and Patient Education Provided   Home exercises include:  Cardio program:7/5/23  Lifting education date:6/16/23  Posture/Lumbar roll: yes, pt states he feels it is more uncomfortable  [employer] Discharge handout  (date given):  Equipment at home/gym membership: yes      Education provided:   - exertion level  Review of HEP, instructed pt to only perform EIS in modified ranges    Written Home Exercises Provided: Patient instructed to cont prior HEP.  Exercises were reviewed and EDIEJ was able to demonstrate them prior to the end of the session.  JJ demonstrated good  understanding of the education provided.     See EMR under Patient Instructions for exercises provided 6/7/2023.          Assessment     Pt arrives with min c/o pain today.  Pt had a lapse from evaluation to 2nd visit, but is now scheduled consistently past visit 10. States EIS has been increasing pain after performing it at home. Reviewed HEP and noted pt was going to end range extension in standing, altered to mid range extension and advised pt to attempt at home and monitor response.  If EIS still increases pain, defer next visit.  Initiated Med X at 85ft/lbs based upon peak torque visit 1.  Pt completed 15 reps at 4.5/10 exertion level.  Pt able to get through 1/2 peripheral machines with min discomfort.  Pt noted inc LBP while sitting on hip Abd/Add.  Discussed altering position next visit.  Patient is making good progress towards established goals.  Pt will continue to benefit from skilled outpatient physical therapy to address the deficits stated in the impairment chart, provide pt/family education and to maximize pt's level of independence in the home and community environment.     Anticipated Barriers for therapy: none  Pt's spiritual, cultural and educational needs considered and pt agreeable to plan of care and goals as stated below:        GOALS: Pt is in agreement with the following goals.     Short term goals:  6 weeks or 10 visits   1.  Pt will demonstrate increased lumbar ROM by at least 3 degrees from the initial ROM value with improvements noted in functional ROM and ability to perform ADLs.  (approp and ongoing)  2.  Pt will demonstrate increased  MedX average isometric strength value  by 20% from initial test resulting in improved ability to perform bending, lifting, and carrying activities safely, confidently.  (approp and ongoing)  3.  Patient report a reduction in worst pain score by 1-2 points for improved tolerance for sitting.  (approp and ongoing)  4.  Pt able to perform HEP correctly with minimal cueing or supervision from therapist to encourage independent management of symptoms. (approp and ongoing)        Long term goals: 10 weeks or 20 visits   1. Pt will demonstrate increased lumbar ROM by at least 6 degrees from initial ROM value, resulting in improved ability to perform functional fwd bending while standing and sitting. (approp and ongoing)  2. Pt will demonstrate increased MedX average isometric strength value  by 40% from initial test resulting in improved ability to perform bending, lifting, and carrying activities safely, confidently.  (approp and ongoing)  3. Pt to demonstrate ability to independently control and reduce their pain through posture positioning and mechanical movements throughout a typical day.  (approp and ongoing)  4.  Pt will demonstrate reduced pain and improved functional outcomes as reported on the FOTO by reaching a limitation score of < or = 30% or less in order to demonstrate subjective improvement in pt's condition.    (approp and ongoing)  5. Pt will demonstrate independence with the HEP at discharge  (approp and ongoing)  6.  Pt(patient goal)will report no pain or stiffness with driving > 1 hour  (approp and ongoing)       Plan   Continue with established Plan of Care towards established PT goals.       Therapist: Mercedes Conteh, TRUPTI  6/7/2023

## 2023-06-09 ENCOUNTER — CLINICAL SUPPORT (OUTPATIENT)
Dept: REHABILITATION | Facility: HOSPITAL | Age: 48
End: 2023-06-09
Payer: COMMERCIAL

## 2023-06-09 DIAGNOSIS — R29.3 POOR POSTURE: Primary | ICD-10-CM

## 2023-06-09 DIAGNOSIS — R29.898 DECREASED STRENGTH OF TRUNK AND BACK: ICD-10-CM

## 2023-06-09 PROCEDURE — 97750 PHYSICAL PERFORMANCE TEST: CPT | Mod: 32

## 2023-06-09 NOTE — PROGRESS NOTES
"[facility]      Name: Darek Mcnair  Clinic Number: [id number]    Therapy Diagnosis:   Encounter Diagnoses   Name Primary?    Poor posture Yes    Decreased strength of trunk and back      Physician: Chata Kilgore, *    Visit Date: 2023  Physician Orders: PT Eval and Treat   Medical Diagnosis from Referral: M54.50,G89.29 (ICD-10-CM) - Chronic left-sided low back pain without sciatica  Evaluation Date: 2023  Authorization Period Expiration: 23  Plan of Care Expiration: 23  Reassessment Due:23  Visit # / Visits authorized:3 /20     Time In: 1:30 PM  Time Out:  2:30 PM  Total Billable Time: 60 minutes  Insurance:[employer] Benefit Patient     Precautions: Standard     Pattern of pain determined: 1PEN     Subjective   Darek reports chronic c/o of (L) lower back/QL discomfort which is mild. States that he is able to play golf without pain. Min report of muscular soreness after his first follow-up visit last session.      Patient reports tolerating previous visit with moderate DOMS  Patient reports their pain to be 1/10 on a 0-10 scale with 0 being no pain and 10 being the worst pain imaginable.  Pain Location: LLB/quadratus  Social History:  lives with their spouse  Occupation: Works in [employer], sitting and standing  Leisure: fish/running /golf   Pts goals: "Get rid of pain"    Objective     Baseline Isometric Testing on Med X equipment: Testing administered by PT  Date of testin23  ROM 6-54 deg   Max Peak Torque 226    Min Peak Torque 119    Flex/Ext Ratio 1.8:1   % below normative data 16          Limitation/Restriction for FOTO Lumbar Survey     Therapist reviewed FOTO scores for Darek Mcnair on 2023.   FOTO documents entered into [employer] - see Media section.     Limitation Score: 33%         Treatment    AZUCENA received the treatments listed below:      JJ received neuromuscular education  to isolate and engage spinal " stabilization musculature correctly for motor control and coordination to aid in function and posture for 10 minutes on the Medical Medx Machine.  Patient performed MedX dynamic exercise with emphasis on spinal muscular control using pacer throughout  active range of motion. Therapist assisted patient in achieving optimal exertion for neural reeducation and endurance training by using the  Cyndi Exertion Rating scale, by instructing the patient to aim for mid range of exertion, performing 15-20 repetitions, slowly, correctly,and safely.     [facility] - Short 6/9/2023   Visit Number 3   VAS Pain Rating 1   Time 5   Lumbar Stretches - Slouch 10   Extension in Standing 10   Manual Therapy 5   Femur Restraint -   Top Dead Center -   Counterweight -   Lumbar Flexion -   Lumbar Extension -   Lumbar Peak Torque -   Lumbar Weight 85   Repetitions 18   Rating of Perceived Exertion 5       therapeutic exercises to develop strength, endurance, ROM, flexibility, posture, and core stabilization for 45 minutes including:    Standing quadratus stretch stretching L 3 x 20 sec--NP  LTR x 10  Open book 10x  +Supine rotational QL stretch on (L) 5 x 10 sec   +Cat/cow x 10  Seated extension with foam roll 10x  EIS in modified ranges x 10  +SOC x 10    Peripheral muscle strengthening which included 1 set of 15-20 repetitions at a slow, controlled 10-13 second per rep pace focused on strengthening supporting musculature for improved body mechanics and functional mobility.  Pt and therapist focused on proper form during treatment to ensure optimal strengthening of each targeted muscle group.  Machines were utilized including torso rotation, leg press, hip abd and hip add, leg ext.  Leg curl, triceps, biceps, chest and row added visit 3    manual therapy techniques: Vacuum/cupping STM with manual therapy techniques x 5 minutes was performed to decrease muscle tightness, increase circulation and promote healing process. The pt's  skin was monitored for redness adjusting pressure as needed. The pt was instructed in possible side effects of bruising and/or soreness.   .   cold pack for 5 minutes to LB.    Home Exercises Provided and Patient Education Provided   Home exercises include: Standing quadratus stretch  LTR  EIS  Open book  Cardio program:7/5/23  Lifting education date:6/16/23  Posture/Lumbar roll: yes, pt states he feels it is more uncomfortable  [employer] Discharge handout (date given):  Equipment at home/gym membership: yes    Education provided:   - Cues w/ex's  - MedX performance  - Precor ex performance  - Cyndi exertion scale    Written Home Exercises Provided: Patient instructed to cont prior HEP.  Exercises were reviewed and AZUCENA was able to demonstrate them prior to the end of the session.  EDIEJ demonstrated good  understanding of the education provided.     See EMR under Patient Instructions for exercises provided prior visit.    Assessment   JJ returns with his usual c/o (L) lower back discomfort which is rated as mild currently.  Treatment continued with lumbopelvic/core/flexibility and strengthening ex's.  Added Supine rotational  QL stretch on (L)  along with Cat/cow and SOC for additional flexibility work. He was able to perform ex's with minimal cuing and without increased pain. Also added manual techniques to include cupping which provides some relief without adverse effects. Lumbar MedX resistance was maintained at 85 ft/lbs completing 18 reps with a RPE = 5/10. He reported no problems with peripheral hip abd/add ex this visit and was also able to complete the full circuit of peripheral ex's within appropriate exertional levels without c/o pain. Will look to progress per HB protocol and patient tolerance.     Patient is making good progress towards established goals.  Pt will continue to benefit from skilled outpatient physical therapy to address the deficits stated in the impairment chart, provide pt/family  education and to maximize pt's level of independence in the home and community environment.     Anticipated Barriers for therapy: none  Pt's spiritual, cultural and educational needs considered and pt agreeable to plan of care and goals as stated below:    GOALS: Pt is in agreement with the following goals.     Short term goals:  6 weeks or 10 visits   1.  Pt will demonstrate increased lumbar ROM by at least 3 degrees from the initial ROM value with improvements noted in functional ROM and ability to perform ADLs.  (approp and ongoing)  2.  Pt will demonstrate increased MedX average isometric strength value  by 20% from initial test resulting in improved ability to perform bending, lifting, and carrying activities safely, confidently.  (approp and ongoing)  3.  Patient report a reduction in worst pain score by 1-2 points for improved tolerance for sitting.  (approp and ongoing)  4.  Pt able to perform HEP correctly with minimal cueing or supervision from therapist to encourage independent management of symptoms. (approp and ongoing)      Long term goals: 10 weeks or 20 visits   1. Pt will demonstrate increased lumbar ROM by at least 6 degrees from initial ROM value, resulting in improved ability to perform functional fwd bending while standing and sitting. (approp and ongoing)  2. Pt will demonstrate increased MedX average isometric strength value  by 40% from initial test resulting in improved ability to perform bending, lifting, and carrying activities safely, confidently.  (approp and ongoing)  3. Pt to demonstrate ability to independently control and reduce their pain through posture positioning and mechanical movements throughout a typical day.  (approp and ongoing)  4.  Pt will demonstrate reduced pain and improved functional outcomes as reported on the FOTO by reaching a limitation score of < or = 30% or less in order to demonstrate subjective improvement in pt's condition.    (approp and ongoing)  5. Pt will  demonstrate independence with the HEP at discharge  (approp and ongoing)  6.  Pt(patient goal)will report no pain or stiffness with driving > 1 hour  (approp and ongoing)     Plan   Continue with established Plan of Care towards established PT goals.     Therapist: Darek Montes, PTA  6/9/2023

## 2023-06-14 ENCOUNTER — CLINICAL SUPPORT (OUTPATIENT)
Dept: REHABILITATION | Facility: HOSPITAL | Age: 48
End: 2023-06-14
Payer: COMMERCIAL

## 2023-06-14 DIAGNOSIS — R29.3 POOR POSTURE: Primary | ICD-10-CM

## 2023-06-14 DIAGNOSIS — R29.898 DECREASED STRENGTH OF TRUNK AND BACK: ICD-10-CM

## 2023-06-14 PROCEDURE — 97750 PHYSICAL PERFORMANCE TEST: CPT | Mod: 32

## 2023-06-14 NOTE — PROGRESS NOTES
"[facility]      Name: Darek Mcnair  Clinic Number: [id number]    Therapy Diagnosis:   Encounter Diagnoses   Name Primary?    Poor posture Yes    Decreased strength of trunk and back      Physician: Chata Kilgore, *    Visit Date: 2023  Physician Orders: PT Eval and Treat   Medical Diagnosis from Referral: M54.50,G89.29 (ICD-10-CM) - Chronic left-sided low back pain without sciatica  Evaluation Date: 2023  Authorization Period Expiration: 23  Plan of Care Expiration: 23  Reassessment Due:23  Visit # / Visits authorized:3 /20     Time In: 7  Time Out:  8  Total Billable Time: 60 minutes  Insurance:Healthy [employer] Benefit Patient     Precautions: Standard     Pattern of pain determined: 1PEN     Subjective   Darek reports chronic LLB pain.  Pt reports he recently went and had a massage and felt increased tenderness in the L quad/HS and calf.  Patient reports tolerating previous visit with moderate DOMS  Patient reports their pain to be 1/10 on a 0-10 scale with 0 being no pain and 10 being the worst pain imaginable.  Pain Location: LLB/quadratus  Social History:  lives with their spouse  Occupation: Works in [employer] tech, sitting and standing  Leisure: fish/running /golf   Pts goals: "Get rid of pain"    Objective     Baseline Isometric Testing on Med X equipment: Testing administered by PT  Date of testin23  ROM 6-54 deg   Max Peak Torque 226    Min Peak Torque 119    Flex/Ext Ratio 1.8:1   % below normative data 16          Limitation/Restriction for FOTO Lumbar Survey     Therapist reviewed FOTO scores for Darek Mcnair on 2023.   FOTO documents entered into [employer] - see Media section.     Limitation Score: 33%         Treatment    AZUCENA received the treatments listed below:      JJ received neuromuscular education  to isolate and engage spinal stabilization musculature correctly for motor control and coordination to aid in function " and posture for 10 minutes on the Medical Medx Machine.  Patient performed MedX dynamic exercise with emphasis on spinal muscular control using pacer throughout  active range of motion. Therapist assisted patient in achieving optimal exertion for neural reeducation and endurance training by using the  Cyndi Exertion Rating scale, by instructing the patient to aim for mid range of exertion, performing 15-20 repetitions, slowly, correctly,and safely.   [facility] 6/14/2023   Visit Number 4   VAS Pain Rating 1   Time 5   Lumbar Stretches - Slouch Overcorrection 10   Extension in Standing 10   Manual Therapy 5   Femur Restraint -   Top Dead Center -   Counterweight -   Lumbar Flexion -   Lumbar Extension -   Lumbar Peak Torque -   Min Torque -   Test Percent Below Normative Data -   Lumbar Weight 85   Repetitions 20   Rating of Perceived Exertion 4.5   Ice - Z Lie (in min.) 5         therapeutic exercises to develop strength, endurance, ROM, flexibility, posture, and core stabilization for 50 minutes including:    Standing quadratus stretch stretching L 3 x 20 sec--NP  LTR x 10  Open book 10x  +Supine rotational QL stretch on (L) 5 x 10 sec   +Cat/cow x 10  Seated extension with foam roll 10x  EIS in modified ranges x 10  +SOC x 10  HSS c/ strap 2 x 30 seconds  Peripheral muscle strengthening which included 1 set of 15-20 repetitions at a slow, controlled 10-13 second per rep pace focused on strengthening supporting musculature for improved body mechanics and functional mobility.  Pt and therapist focused on proper form during treatment to ensure optimal strengthening of each targeted muscle group.  Machines were utilized including torso rotation, leg press, hip abd and hip add, leg ext.  Leg curl, triceps, biceps, chest and row added visit 3    manual therapy techniques: Vacuum/cupping STM with manual therapy techniques x 5 minutes was performed to decrease muscle tightness, increase circulation and promote  healing process. The pt's skin was monitored for redness adjusting pressure as needed. The pt was instructed in possible side effects of bruising and/or soreness.   .   cold pack for 5 minutes to LB.    Home Exercises Provided and Patient Education Provided   Home exercises include: Standing quadratus stretch  LTR  EIS  Open book  Cardio program:7/5/23  Lifting education date:6/16/23  Posture/Lumbar roll: yes, pt states he feels it is more uncomfortable  [employer] Discharge handout (date given):  Equipment at home/gym membership: yes    Education provided:   - Cues w/ex's  - MedX performance  - Precor ex performance  - Cyndi exertion scale    Written Home Exercises Provided: Patient instructed to cont prior HEP.  Exercises were reviewed and EDIEJ was able to demonstrate them prior to the end of the session.  JJ demonstrated good  understanding of the education provided.     See EMR under Patient Instructions for exercises provided prior visit.    Assessment   JJ returns with minimal soreness today.  Reports continued stiffness, especially on the Left side. Continued with dynamic cupping with deep prep x 5 minutes to increase flexibility and blood flow to the Left Quadratus and paraspinals.   Added HS stretching today with strap.  Pt continued with Med X at 85ft/lbs with completion of 20 reps at 4.5/10 exertion level.  Increase 5% next visit.  Patient is making good progress towards established goals.  Pt will continue to benefit from skilled outpatient physical therapy to address the deficits stated in the impairment chart, provide pt/family education and to maximize pt's level of independence in the home and community environment.     Anticipated Barriers for therapy: none  Pt's spiritual, cultural and educational needs considered and pt agreeable to plan of care and goals as stated below:    GOALS: Pt is in agreement with the following goals.     Short term goals:  6 weeks or 10 visits   1.  Pt will demonstrate  increased lumbar ROM by at least 3 degrees from the initial ROM value with improvements noted in functional ROM and ability to perform ADLs.  (approp and ongoing)  2.  Pt will demonstrate increased MedX average isometric strength value  by 20% from initial test resulting in improved ability to perform bending, lifting, and carrying activities safely, confidently.  (approp and ongoing)  3.  Patient report a reduction in worst pain score by 1-2 points for improved tolerance for sitting.  (approp and ongoing)  4.  Pt able to perform HEP correctly with minimal cueing or supervision from therapist to encourage independent management of symptoms. (approp and ongoing)      Long term goals: 10 weeks or 20 visits   1. Pt will demonstrate increased lumbar ROM by at least 6 degrees from initial ROM value, resulting in improved ability to perform functional fwd bending while standing and sitting. (approp and ongoing)  2. Pt will demonstrate increased MedX average isometric strength value  by 40% from initial test resulting in improved ability to perform bending, lifting, and carrying activities safely, confidently.  (approp and ongoing)  3. Pt to demonstrate ability to independently control and reduce their pain through posture positioning and mechanical movements throughout a typical day.  (approp and ongoing)  4.  Pt will demonstrate reduced pain and improved functional outcomes as reported on the FOTO by reaching a limitation score of < or = 30% or less in order to demonstrate subjective improvement in pt's condition.    (approp and ongoing)  5. Pt will demonstrate independence with the HEP at discharge  (approp and ongoing)  6.  Pt(patient goal)will report no pain or stiffness with driving > 1 hour  (approp and ongoing)     Plan   Continue with established Plan of Care towards established PT goals.     Therapist: Mercedes Conteh, TRUPTI  6/14/2023

## 2023-06-16 ENCOUNTER — CLINICAL SUPPORT (OUTPATIENT)
Dept: REHABILITATION | Facility: HOSPITAL | Age: 48
End: 2023-06-16
Payer: COMMERCIAL

## 2023-06-16 DIAGNOSIS — R29.3 POOR POSTURE: Primary | ICD-10-CM

## 2023-06-16 DIAGNOSIS — R29.898 DECREASED STRENGTH OF TRUNK AND BACK: ICD-10-CM

## 2023-06-16 PROCEDURE — 97750 PHYSICAL PERFORMANCE TEST: CPT | Mod: 32

## 2023-06-16 NOTE — PROGRESS NOTES
"[facility]      Name: Darek Mcnair  Clinic Number: [id number]    Therapy Diagnosis:   Encounter Diagnoses   Name Primary?    Poor posture Yes    Decreased strength of trunk and back      Physician: Chata Kilgore, *    Visit Date: 2023  Physician Orders: PT Eval and Treat   Medical Diagnosis from Referral: M54.50,G89.29 (ICD-10-CM) - Chronic left-sided low back pain without sciatica  Evaluation Date: 2023  Authorization Period Expiration: 23  Plan of Care Expiration: 23  Reassessment Due:23  Visit # / Visits authorized:     Time In: 1:15 PM  Time Out:  2:30 PM   Total Billable Time: 68 minutes  Insurance:Healthy [employer] Benefit Patient     Precautions: Standard     Pattern of pain determined: 1PEN     Subjective   Darek reports some continued L lower back pain.  He states that his morning discomfort is not as bad as it used to be. He does report some distal ITB tightness/discomfort at lateral knee.    Patient reports tolerating previous visit : Minimal muscular soreness  Patient reports their pain to be 1/10 on a 0-10 scale with 0 being no pain and 10 being the worst pain imaginable.  Pain Location: LLB/quadratus  Social History:  lives with their spouse  Occupation: Works in [employer], sitting and standing  Leisure: fish/running /golf   Pts goals: "Get rid of pain"    Objective     Baseline Isometric Testing on Med X equipment: Testing administered by PT  Date of testin23  ROM 6-54 deg   Max Peak Torque 226    Min Peak Torque 119    Flex/Ext Ratio 1.8:1   % below normative data 16          Limitation/Restriction for FOTO Lumbar Survey     Therapist reviewed FOTO scores for Darek Mcnair on 2023.   FOTO documents entered into [email or web address] - see Media section.     Limitation Score: 33%         Treatment    AZUCENA received the treatments listed below:      JJ received neuromuscular education  to isolate and engage spinal stabilization " "musculature correctly for motor control and coordination to aid in function and posture for 10 minutes on the Medical Medx Machine.  Patient performed MedX dynamic exercise with emphasis on spinal muscular control using pacer throughout  active range of motion. Therapist assisted patient in achieving optimal exertion for neural reeducation and endurance training by using the  Cyndi Exertion Rating scale, by instructing the patient to aim for mid range of exertion, performing 15-20 repetitions, slowly, correctly,and safely.     [facility] - Short 6/16/2023   Visit Number 5   VAS Pain Rating 1   Time 5   Lumbar Stretches - Slouch 10   Extension in Standing 10   Manual Therapy -   Femur Restraint -   Top Dead Center -   Counterweight -   Lumbar Flexion -   Lumbar Extension -   Lumbar Peak Torque -   Lumbar Weight 89   Repetitions 17   Rating of Perceived Exertion 5.5      therapeutic exercises to develop strength, endurance, ROM, flexibility, posture, and core stabilization for 50 minutes including:    Standing quadratus stretch stretching L 3 x 20 sec--NP  LTR x 10  Open book 10x  Supine rotational QL stretch 5 x 10 sec  HSS w/strap 2 x 20 sec   +PPT x 10  + Bridging w/GTB x 15  Cat/cow x 10  +Bird dog x 10 ( cues for level pelvis)  Seated extension with foam roll 10x-NP  EIS in modified ranges x 10  SOC x 10  +Standing ITB stretch (L) 3 x 20"      Peripheral muscle strengthening which included 1 set of 15-20 repetitions at a slow, controlled 10-13 second per rep pace focused on strengthening supporting musculature for improved body mechanics and functional mobility.  Pt and therapist focused on proper form during treatment to ensure optimal strengthening of each targeted muscle group.  Machines were utilized including torso rotation, leg press, hip abd and hip add, leg ext.  Leg curl, triceps, biceps, chest and row added visit 3    manual therapy techniques: x 8 minutes  total to include: Stick massage to (L) " ITB 3 minutes Dynamic Vacuum/cupping STM with manual therapy techniques x 5 minutes was performed to (L) Lumbar paraspinals decrease muscle tightness, increase circulation and promote healing process. The pt's skin was monitored for redness adjusting pressure as needed. The pt was instructed in possible side effects of bruising and/or soreness.   .   cold pack for 5 minutes to LB.    Home Exercises Provided and Patient Education Provided   Home exercises include: Standing quadratus stretch  LTR  EIS  Open book  Cardio program: visit 5 6/16/23 benefits of cardio handout provided.  Lifting education date:visit 11  Posture/Lumbar roll: yes, pt states he feels it is more uncomfortable  Fridge Magnet Discharge handout (date given):  Equipment at home/gym membership: yes    Education provided:   - Cues w/ex's      Written Home Exercises Provided: Patient instructed to cont prior HEP.  Exercises were reviewed and JJ was able to demonstrate them prior to the end of the session.  JJ demonstrated good  understanding of the education provided.     See EMR under Patient Instructions for exercises provided prior visit.    Assessment   JJ returns with mild (L) lower back/QL discomfort/stiffness along with distal (L) ITB discomfort. Treatment continued with lumbopelvic/core flexibility and strengthening ex's. Added bridging with band and bird dog ex for additional core/hip strengthening/stabilization and standing ITB stretch on (L) for flexibility. Continued with manual techniques to include: dynamic vacuum cupping and also added stick massage to (L) ITB. He was educated on the benefits of cardiovascular ex to which he reports compliance with swimming and TM activities. Lumbar MedX resistance was increased to 89 ft/lbs completing 17 reps with a RPE = 5.5/10. He was able to complete the full circuit of peripheral strengthening ex's within appropriate muscular fatigue without increased pain. Will look to progress per HB protocol and  patient tolerance. May consider dry needling in future visits.     Patient is making good progress towards established goals.  Pt will continue to benefit from skilled outpatient physical therapy to address the deficits stated in the impairment chart, provide pt/family education and to maximize pt's level of independence in the home and community environment.     Anticipated Barriers for therapy: none  Pt's spiritual, cultural and educational needs considered and pt agreeable to plan of care and goals as stated below:    GOALS: Pt is in agreement with the following goals.     Short term goals:  6 weeks or 10 visits   1.  Pt will demonstrate increased lumbar ROM by at least 3 degrees from the initial ROM value with improvements noted in functional ROM and ability to perform ADLs.  (approp and ongoing)  2.  Pt will demonstrate increased MedX average isometric strength value  by 20% from initial test resulting in improved ability to perform bending, lifting, and carrying activities safely, confidently.  (approp and ongoing)  3.  Patient report a reduction in worst pain score by 1-2 points for improved tolerance for sitting.  (approp and ongoing)  4.  Pt able to perform HEP correctly with minimal cueing or supervision from therapist to encourage independent management of symptoms. (approp and ongoing)      Long term goals: 10 weeks or 20 visits   1. Pt will demonstrate increased lumbar ROM by at least 6 degrees from initial ROM value, resulting in improved ability to perform functional fwd bending while standing and sitting. (approp and ongoing)  2. Pt will demonstrate increased MedX average isometric strength value  by 40% from initial test resulting in improved ability to perform bending, lifting, and carrying activities safely, confidently.  (approp and ongoing)  3. Pt to demonstrate ability to independently control and reduce their pain through posture positioning and mechanical movements throughout a typical day.   (approp and ongoing)  4.  Pt will demonstrate reduced pain and improved functional outcomes as reported on the FOTO by reaching a limitation score of < or = 30% or less in order to demonstrate subjective improvement in pt's condition.    (approp and ongoing)  5. Pt will demonstrate independence with the HEP at discharge  (approp and ongoing)  6.  Pt(patient goal)will report no pain or stiffness with driving > 1 hour  (approp and ongoing)     Plan   Continue with established Plan of Care towards established PT goals.     Therapist: Darek Montes, PTA  6/16/2023

## 2023-06-21 ENCOUNTER — CLINICAL SUPPORT (OUTPATIENT)
Dept: REHABILITATION | Facility: HOSPITAL | Age: 48
End: 2023-06-21
Payer: COMMERCIAL

## 2023-06-21 DIAGNOSIS — R29.3 POOR POSTURE: Primary | ICD-10-CM

## 2023-06-21 DIAGNOSIS — R29.898 DECREASED STRENGTH OF TRUNK AND BACK: ICD-10-CM

## 2023-06-21 PROCEDURE — 97750 PHYSICAL PERFORMANCE TEST: CPT | Mod: 32

## 2023-06-21 NOTE — PROGRESS NOTES
"[facility]      Name: Darek Mcnair  Clinic Number: [id number]    Therapy Diagnosis:   Encounter Diagnoses   Name Primary?    Poor posture Yes    Decreased strength of trunk and back      Physician: Chata Kilgore, *    Visit Date: 2023  Physician Orders: PT Eval and Treat   Medical Diagnosis from Referral: M54.50,G89.29 (ICD-10-CM) - Chronic left-sided low back pain without sciatica  Evaluation Date: 2023  Authorization Period Expiration: 23  Plan of Care Expiration: 23  Reassessment Due:23  Visit # / Visits authorized:     Time In: 4:30 PM  Time Out: 5:30 PM   Total Billable Time: 60 minutes  Insurance:[employer]y [employer] Benefit Patient     Precautions: Standard     Pattern of pain determined: 1PEN     Subjective   Darek reports some continued L lower back pain but slightly improved.  He states that his morning discomfort is not as bad as it used to be. States that he played gold over the weekend for Father's day without much difficulty.     Patient reports tolerating previous visit : Minimal muscular soreness  Patient reports their pain to be 1/10 on a 0-10 scale with 0 being no pain and 10 being the worst pain imaginable.  Pain Location: LLB/quadratus  Social History:  lives with their spouse  Occupation: Works in [employer], sitting and standing  Leisure: fish/running /golf   Pts goals: "Get rid of pain"    Objective     Baseline Isometric Testing on Med X equipment: Testing administered by PT  Date of testin23  ROM 6-54 deg   Max Peak Torque 226    Min Peak Torque 119    Flex/Ext Ratio 1.8:1   % below normative data 16          Limitation/Restriction for FOTO Lumbar Survey     Therapist reviewed FOTO scores for Darek Mcnair on 2023.   FOTO documents entered into [employer] - see Media section.     Limitation Score: 33%         Treatment    AZUCENA received the treatments listed below:      JJ received neuromuscular education  to " "isolate and engage spinal stabilization musculature correctly for motor control and coordination to aid in function and posture for 10 minutes on the Medical Medx Machine.  Patient performed MedX dynamic exercise with emphasis on spinal muscular control using pacer throughout  active range of motion. Therapist assisted patient in achieving optimal exertion for neural reeducation and endurance training by using the  Cynid Exertion Rating scale, by instructing the patient to aim for mid range of exertion, performing 15-20 repetitions, slowly, correctly,and safely.     [facility] - Short 6/21/2023   Visit Number 6   VAS Pain Rating 1   Time 5   Lumbar Stretches - Slouch 10   Extension in Standing 10   Manual Therapy -   Femur Restraint -   Top Dead Center -   Counterweight -   Lumbar Flexion -   Lumbar Extension -   Lumbar Peak Torque -   Lumbar Weight 89   Repetitions 18   Rating of Perceived Exertion 5       therapeutic exercises to develop strength, endurance, ROM, flexibility, posture, and core stabilization for 50 minutes including:    LTR x 10  Open book  x 5 for mobility and 5 with GTB for strengthening  Supine rotational QL stretch 5 x 10 sec  HSS w/strap 2 x 20 sec   PPT x 10  Bridging w/BTB ( AROM ER at top) x 15  Cat/cow x 10  Bird dog x 10 ( cues for level pelvis)  Seated extension with foam roll 10x-NP  EIS in modified ranges x 10  SOC x 10  + Paloff press on Inspire cable cross 15# x 15 reps  Standing ITB stretch (L) 3 x 20"--NP    Peripheral muscle strengthening which included 1 set of 15-20 repetitions at a slow, controlled 10-13 second per rep pace focused on strengthening supporting musculature for improved body mechanics and functional mobility.  Pt and therapist focused on proper form during treatment to ensure optimal strengthening of each targeted muscle group.  Machines were utilized including torso rotation, leg press, hip abd and hip add, leg ext.  Leg curl, triceps, biceps, chest and " row added visit 3    manual therapy techniques: x 00 minutes  total to include: Stick massage to (L) ITB 0 minutes Dynamic Vacuum/cupping STM with manual therapy techniques x 5 minutes was performed to (L) Lumbar paraspinals decrease muscle tightness, increase circulation and promote healing process. The pt's skin was monitored for redness adjusting pressure as needed. The pt was instructed in possible side effects of bruising and/or soreness.   .   cold pack for 5 minutes to LB.    Home Exercises Provided and Patient Education Provided   Home exercises include: Standing quadratus stretch  LTR  EIS  Open book  Cardio program: visit 5 6/16/23 benefits of cardio handout provided.  Lifting education date:visit 11  Posture/Lumbar roll: yes, pt states he feels it is more uncomfortable  [employer]idge [employer] Discharge handout (date given):  Equipment at home/gym membership: yes    Education provided:   - Cues w/ex's    Written Home Exercises Provided: Patient instructed to cont prior HEP.  Exercises were reviewed and AZUCENA was able to demonstrate them prior to the end of the session.  AZUCENA demonstrated good  understanding of the education provided.     See EMR under Patient Instructions for exercises provided prior visit.    Assessment   JEDIE returns with mild (L) lower back/QL discomfort/stiffness. Treatment continued with lumbopelvic/core flexibility and strengthening ex's. He performed open book ex for flexibility along with addition of 5 reps with GTB for strengthening. Added Paloff press this visit for additional core strengthening which he was able to perform without c/o.. Lumbar MedX extension ROM was increased to 3 degrees and resistance was maintained at to 89 ft/lbs completing 18 reps with a RPE = 5/10. He was able to complete the full circuit of peripheral strengthening ex's within appropriate muscular fatigue without increased pain. Will look to progress per HB protocol and patient tolerance.     Patient is making good  progress towards established goals.  Pt will continue to benefit from skilled outpatient physical therapy to address the deficits stated in the impairment chart, provide pt/family education and to maximize pt's level of independence in the home and community environment.     Anticipated Barriers for therapy: none  Pt's spiritual, cultural and educational needs considered and pt agreeable to plan of care and goals as stated below:    GOALS: Pt is in agreement with the following goals.     Short term goals:  6 weeks or 10 visits   1.  Pt will demonstrate increased lumbar ROM by at least 3 degrees from the initial ROM value with improvements noted in functional ROM and ability to perform ADLs.  (approp and ongoing)  2.  Pt will demonstrate increased MedX average isometric strength value  by 20% from initial test resulting in improved ability to perform bending, lifting, and carrying activities safely, confidently.  (approp and ongoing)  3.  Patient report a reduction in worst pain score by 1-2 points for improved tolerance for sitting.  (approp and ongoing)  4.  Pt able to perform HEP correctly with minimal cueing or supervision from therapist to encourage independent management of symptoms. (approp and ongoing)      Long term goals: 10 weeks or 20 visits   1. Pt will demonstrate increased lumbar ROM by at least 6 degrees from initial ROM value, resulting in improved ability to perform functional fwd bending while standing and sitting. (approp and ongoing)  2. Pt will demonstrate increased MedX average isometric strength value  by 40% from initial test resulting in improved ability to perform bending, lifting, and carrying activities safely, confidently.  (approp and ongoing)  3. Pt to demonstrate ability to independently control and reduce their pain through posture positioning and mechanical movements throughout a typical day.  (approp and ongoing)  4.  Pt will demonstrate reduced pain and improved functional  outcomes as reported on the FOTO by reaching a limitation score of < or = 30% or less in order to demonstrate subjective improvement in pt's condition.    (approp and ongoing)  5. Pt will demonstrate independence with the HEP at discharge  (approp and ongoing)  6.  Pt(patient goal)will report no pain or stiffness with driving > 1 hour  (approp and ongoing)     Plan   Continue with established Plan of Care towards established PT goals.     Therapist: Darek Montes, PTA  6/21/2023

## 2023-06-23 ENCOUNTER — CLINICAL SUPPORT (OUTPATIENT)
Dept: REHABILITATION | Facility: HOSPITAL | Age: 48
End: 2023-06-23
Payer: COMMERCIAL

## 2023-06-23 DIAGNOSIS — R29.898 DECREASED STRENGTH OF TRUNK AND BACK: ICD-10-CM

## 2023-06-23 DIAGNOSIS — R29.3 POOR POSTURE: Primary | ICD-10-CM

## 2023-06-23 PROCEDURE — 97750 PHYSICAL PERFORMANCE TEST: CPT | Mod: 32

## 2023-06-23 NOTE — PROGRESS NOTES
"[facility]      Name: Darek Mcnair  Clinic Number: [id number]    Therapy Diagnosis:   Encounter Diagnoses   Name Primary?    Poor posture Yes    Decreased strength of trunk and back      Physician: Chata Kilgore, *    Visit Date: 2023  Physician Orders: PT Eval and Treat   Medical Diagnosis from Referral: M54.50,G89.29 (ICD-10-CM) - Chronic left-sided low back pain without sciatica  Evaluation Date: 2023  Authorization Period Expiration: 23  Plan of Care Expiration: 23  Reassessment Due:23  Visit # / Visits authorized:     Time In:  1:30 PM  Time Out:2:35 PM   Total Billable Time: 60 minutes  Insurance:Healthy [employer] Benefit Patient     Precautions: Standard     Pattern of pain determined: 1PEN     Subjective   Darek reports some continued L lower back discomfort/stiffness/weakness but is slightly improved.     Patient reports tolerating previous visit : Minimal muscular soreness  Patient reports their pain to be 1/10 on a 0-10 scale with 0 being no pain and 10 being the worst pain imaginable.  Pain Location: LLB/quadratus  Social History:  lives with their spouse  Occupation: Works in [employer], sitting and standing  Leisure: fish/running /golf   Pts goals: "Get rid of pain"    Objective     Baseline Isometric Testing on Med X equipment: Testing administered by PT  Date of testin23  ROM 6-54 deg   Max Peak Torque 226    Min Peak Torque 119    Flex/Ext Ratio 1.8:1   % below normative data 16          Limitation/Restriction for FOTO Lumbar Survey     Therapist reviewed FOTO scores for Darek Mcnair on 2023.   FOTO documents entered into [employer] - see Media section.     Limitation Score: 33%         Treatment    AZUCENA received the treatments listed below:      JEDIE received neuromuscular education  to isolate and engage spinal stabilization musculature correctly for motor control and coordination to aid in function and posture for " "10 minutes on the Medical Medx Machine.  Patient performed MedX dynamic exercise with emphasis on spinal muscular control using pacer throughout  active range of motion. Therapist assisted patient in achieving optimal exertion for neural reeducation and endurance training by using the  Cyndi Exertion Rating scale, by instructing the patient to aim for mid range of exertion, performing 15-20 repetitions, slowly, correctly,and safely.     [facility] - Short 6/23/2023   Visit Number 7   VAS Pain Rating 1   Time 5   Lumbar Stretches - Slouch -   Extension in Lying 10   Extension in Standing 10   Manual Therapy -   Femur Restraint -   Top Dead Center -   Counterweight -   Lumbar Flexion -   Lumbar Extension -   Lumbar Peak Torque -   Lumbar Weight 89   Repetitions 19   Rating of Perceived Exertion 6        therapeutic exercises to develop strength, endurance, ROM, flexibility, posture, and core stabilization for 50 minutes including:    LTR x 10  Open book  x 5 for mobility and 5 with GTB for strengthening  Supine rotational QL stretch 5 x 10 sec  HSS w/strap 2 x 20 sec   PPT x 10  Bridging w/BTB ( AROM ER at top) x 15  +EIL x 10  Cat/cow x 10  Bird dog x 10 ( cues for level pelvis)  Seated extension with foam roll 10x-NP  EIS in modified ranges x 10  SOC x 10  Paloff press on Inspire cable cross 20# x 15 reps  + Cable cross chop downs with long bar 15# x 10 each  Standing ITB stretch (L) 3 x 20"--NP    Peripheral muscle strengthening which included 1 set of 15-20 repetitions at a slow, controlled 10-13 second per rep pace focused on strengthening supporting musculature for improved body mechanics and functional mobility.  Pt and therapist focused on proper form during treatment to ensure optimal strengthening of each targeted muscle group.  Machines were utilized including torso rotation, leg press, hip abd and hip add, leg ext.  Leg curl, triceps, biceps, chest and row added visit 3    manual therapy " techniques: x 00 minutes  total to include: Stick massage to (L) ITB 0 minutes Dynamic Vacuum/cupping STM with manual therapy techniques x 5 minutes was performed to (L) Lumbar paraspinals decrease muscle tightness, increase circulation and promote healing process. The pt's skin was monitored for redness adjusting pressure as needed. The pt was instructed in possible side effects of bruising and/or soreness.   .   cold pack for 5 minutes to LB.    Home Exercises Provided and Patient Education Provided   Home exercises include: Standing quadratus stretch  LTR  EIS  Open book  Cardio program: visit 5 6/16/23 benefits of cardio handout provided.  Lifting education date:visit 11  Posture/Lumbar roll: yes, pt states he feels it is more uncomfortable  Fridge Magnet Discharge handout (date given):  Equipment at home/gym membership: yes    Education provided:   - Cues w/ex's    Written Home Exercises Provided: Patient instructed to cont prior HEP.  Exercises were reviewed and JJ was able to demonstrate them prior to the end of the session.  JJ demonstrated good  understanding of the education provided.     See EMR under Patient Instructions for exercises provided prior visit.    Assessment   JJ returns with mild (L) lower back/QL discomfort/stiffness. Treatment continued with lumbopelvic/core flexibility and strengthening ex's. Added EIL for mobility and Cable cross chop downs for progressive strengthening which he was able to perform with muscular fatigue (L moreso than R) but without increased pain. Lumbar MedX resistance was maintained at to 89 ft/lbs completing 19 reps with a RPE = 6/10. He was able to complete the full circuit of peripheral strengthening ex's within appropriate muscular fatigue without increased pain. Will look to progress per HB protocol and patient tolerance. May consider dry needling in future sessions.     Patient is making good progress towards established goals.  Pt will continue to benefit from  skilled outpatient physical therapy to address the deficits stated in the impairment chart, provide pt/family education and to maximize pt's level of independence in the home and community environment.     Anticipated Barriers for therapy: none  Pt's spiritual, cultural and educational needs considered and pt agreeable to plan of care and goals as stated below:    GOALS: Pt is in agreement with the following goals.     Short term goals:  6 weeks or 10 visits   1.  Pt will demonstrate increased lumbar ROM by at least 3 degrees from the initial ROM value with improvements noted in functional ROM and ability to perform ADLs.  (approp and ongoing)  2.  Pt will demonstrate increased MedX average isometric strength value  by 20% from initial test resulting in improved ability to perform bending, lifting, and carrying activities safely, confidently.  (approp and ongoing)  3.  Patient report a reduction in worst pain score by 1-2 points for improved tolerance for sitting.  (approp and ongoing)  4.  Pt able to perform HEP correctly with minimal cueing or supervision from therapist to encourage independent management of symptoms. (approp and ongoing)      Long term goals: 10 weeks or 20 visits   1. Pt will demonstrate increased lumbar ROM by at least 6 degrees from initial ROM value, resulting in improved ability to perform functional fwd bending while standing and sitting. (approp and ongoing)  2. Pt will demonstrate increased MedX average isometric strength value  by 40% from initial test resulting in improved ability to perform bending, lifting, and carrying activities safely, confidently.  (approp and ongoing)  3. Pt to demonstrate ability to independently control and reduce their pain through posture positioning and mechanical movements throughout a typical day.  (approp and ongoing)  4.  Pt will demonstrate reduced pain and improved functional outcomes as reported on the FOTO by reaching a limitation score of < or = 30%  or less in order to demonstrate subjective improvement in pt's condition.    (approp and ongoing)  5. Pt will demonstrate independence with the HEP at discharge  (approp and ongoing)  6.  Pt(patient goal)will report no pain or stiffness with driving > 1 hour  (approp and ongoing)     Plan   Continue with established Plan of Care towards established PT goals.     Therapist: Darek Montes, PTA  6/23/2023

## 2023-06-28 ENCOUNTER — CLINICAL SUPPORT (OUTPATIENT)
Dept: REHABILITATION | Facility: HOSPITAL | Age: 48
End: 2023-06-28
Payer: COMMERCIAL

## 2023-06-28 DIAGNOSIS — R29.898 DECREASED STRENGTH OF TRUNK AND BACK: ICD-10-CM

## 2023-06-28 DIAGNOSIS — R29.3 POOR POSTURE: Primary | ICD-10-CM

## 2023-06-28 PROCEDURE — 97750 PHYSICAL PERFORMANCE TEST: CPT | Mod: 32

## 2023-06-28 NOTE — PROGRESS NOTES
"[facility]      Name: Darek Mcnair  Clinic Number: [id number]    Therapy Diagnosis:   Encounter Diagnoses   Name Primary?    Poor posture Yes    Decreased strength of trunk and back      Physician: Chata Kilgore, *    Visit Date: 2023  Physician Orders: PT Eval and Treat   Medical Diagnosis from Referral: M54.50,G89.29 (ICD-10-CM) - Chronic left-sided low back pain without sciatica  Evaluation Date: 2023  Authorization Period Expiration: 23  Plan of Care Expiration: 23  Reassessment Due:23  Visit # / Visits authorized:     Time In:  8am  Time Out:9am  Total Billable Time: 55 minutes  Insurance:Healthy [employer] Benefit Patient     Precautions: Standard     Pattern of pain determined: 1PEN     Subjective   Darek reports some improvement with Left sided quadratus pain.  Pt still feels tight, but better overall with less sharp pains noted.  Patient reports tolerating previous visit : Minimal muscular soreness  Patient reports their pain to be 1/10 on a 0-10 scale with 0 being no pain and 10 being the worst pain imaginable.  Pain Location: LLB/quadratus  Social History:  lives with their spouse  Occupation: Works in [employer] tech, sitting and standing  Leisure: fish/running /golf   Pts goals: "Get rid of pain"    Objective     Baseline Isometric Testing on Med X equipment: Testing administered by PT  Date of testin23  ROM 6-54 deg   Max Peak Torque 226    Min Peak Torque 119    Flex/Ext Ratio 1.8:1   % below normative data 16          Limitation/Restriction for FOTO Lumbar Survey     Therapist reviewed FOTO scores for Darek Mcnair on 2023.   FOTO documents entered into [employer] - see Media section.     Limitation Score: 33%         Treatment    JEDIE received the treatments listed below:      JJ received neuromuscular education  to isolate and engage spinal stabilization musculature correctly for motor control and coordination to aid in " "function and posture for 10 minutes on the Medical Medx Machine.  Patient performed MedX dynamic exercise with emphasis on spinal muscular control using pacer throughout  active range of motion. Therapist assisted patient in achieving optimal exertion for neural reeducation and endurance training by using the  Cyndi Exertion Rating scale, by instructing the patient to aim for mid range of exertion, performing 15-20 repetitions, slowly, correctly,and safely.   [facility] 6/28/2023   Visit Number 8   VAS Pain Rating 1   Time 5   Lumbar Stretches - Slouch Overcorrection -   Extension in Lying 10   Extension in Standing 10   Manual Therapy 5   Femur Restraint -   Top Dead Center -   Counterweight -   Lumbar Flexion -   Lumbar Extension -   Lumbar Peak Torque -   Min Torque -   Test Percent Below Normative Data -   Lumbar Weight 89   Repetitions 20   Rating of Perceived Exertion 7   Ice - Z Lie (in min.) 5         therapeutic exercises to develop strength, endurance, ROM, flexibility, posture, and core stabilization for 45 minutes including:    LTR x 10  Open book  x 5 for mobility and 5 with GTB for strengthening  Supine rotational QL stretch 5 x 10 sec  HSS w/strap 2 x 30 sec   PPT x 10  Bridging w/BTB ( AROM ER at top) x 10  Bridging c/ BTB with sports cord for UE pulldown  +EIL x 10  Cat/cow x 10  Bird dog x 10 ( cues for level pelvis)  Seated extension with foam roll 10x-NP  SOC x 10  Paloff press on Inspire cable cross 20# x 15 reps  + Cable cross chop downs with long bar 15# x 10 each  Standing ITB stretch (L) 3 x 20"-    Peripheral muscle strengthening which included 1 set of 15-20 repetitions at a slow, controlled 10-13 second per rep pace focused on strengthening supporting musculature for improved body mechanics and functional mobility.  Pt and therapist focused on proper form during treatment to ensure optimal strengthening of each targeted muscle group.  Machines were utilized including torso " rotation, leg press, hip abd and hip add, leg ext.  Leg curl, triceps, biceps, chest and row added visit 3    manual therapy techniques: minutes  total to include: Stick massage to (L) ITB STM with manual therapy techniques x 5 min  .   cold pack for 5 minutes to LB.    Home Exercises Provided and Patient Education Provided   Home exercises include: Standing quadratus stretch  LTR  EIS  Open book  Cardio program: visit 5 6/16/23 benefits of cardio handout provided.  Lifting education date:visit 11  Posture/Lumbar roll: yes, pt states he feels it is more uncomfortable  Fridge [employer] Discharge handout (date given):  Equipment at home/gym membership: yes    Education provided:   - Cues w/ex's    Written Home Exercises Provided: Patient instructed to cont prior HEP.  Exercises were reviewed and JJ was able to demonstrate them prior to the end of the session.  JJ demonstrated good  understanding of the education provided.     See EMR under Patient Instructions for exercises provided prior visit.    Assessment   JJ returns with mild (L) lower back/QL stiffness. Pt also reporting some increased distal ITB/calf tightness.  Performed ITB stretching with stretch strap followed by 5 minute DTM with thera roller.  Treatment continued with lumbopelvic/core flexibility and strengthening ex's and added bridge with sports cord UE pulldown Lumbar MedX resistance was maintained at to 89 ft/lbs completing 20 reps with a RPE = /10.  Increase 5% next visit. He was able to complete the full circuit of peripheral strengthening ex's within appropriate muscular fatigue without increased pain.   Patient is making good progress towards established goals.  Pt will continue to benefit from skilled outpatient physical therapy to address the deficits stated in the impairment chart, provide pt/family education and to maximize pt's level of independence in the home and community environment.     Anticipated Barriers for therapy: none  Pt's  spiritual, cultural and educational needs considered and pt agreeable to plan of care and goals as stated below:    GOALS: Pt is in agreement with the following goals.     Short term goals:  6 weeks or 10 visits   1.  Pt will demonstrate increased lumbar ROM by at least 3 degrees from the initial ROM value with improvements noted in functional ROM and ability to perform ADLs.  (approp and ongoing)  2.  Pt will demonstrate increased MedX average isometric strength value  by 20% from initial test resulting in improved ability to perform bending, lifting, and carrying activities safely, confidently.  (approp and ongoing)  3.  Patient report a reduction in worst pain score by 1-2 points for improved tolerance for sitting.  (approp and ongoing)  4.  Pt able to perform HEP correctly with minimal cueing or supervision from therapist to encourage independent management of symptoms. (approp and ongoing)      Long term goals: 10 weeks or 20 visits   1. Pt will demonstrate increased lumbar ROM by at least 6 degrees from initial ROM value, resulting in improved ability to perform functional fwd bending while standing and sitting. (approp and ongoing)  2. Pt will demonstrate increased MedX average isometric strength value  by 40% from initial test resulting in improved ability to perform bending, lifting, and carrying activities safely, confidently.  (approp and ongoing)  3. Pt to demonstrate ability to independently control and reduce their pain through posture positioning and mechanical movements throughout a typical day.  (approp and ongoing)  4.  Pt will demonstrate reduced pain and improved functional outcomes as reported on the FOTO by reaching a limitation score of < or = 30% or less in order to demonstrate subjective improvement in pt's condition.    (approp and ongoing)  5. Pt will demonstrate independence with the HEP at discharge  (approp and ongoing)  6.  Pt(patient goal)will report no pain or stiffness with driving  > 1 hour  (approp and ongoing)     Plan   Continue with established Plan of Care towards established PT goals.     Therapist: Mercedes Conteh, PT  6/28/2023

## 2023-06-30 ENCOUNTER — DOCUMENTATION ONLY (OUTPATIENT)
Dept: REHABILITATION | Facility: HOSPITAL | Age: 48
End: 2023-06-30
Payer: COMMERCIAL

## 2023-06-30 ENCOUNTER — CLINICAL SUPPORT (OUTPATIENT)
Dept: REHABILITATION | Facility: HOSPITAL | Age: 48
End: 2023-06-30
Payer: COMMERCIAL

## 2023-06-30 DIAGNOSIS — R29.898 DECREASED STRENGTH OF TRUNK AND BACK: ICD-10-CM

## 2023-06-30 DIAGNOSIS — R29.3 POOR POSTURE: Primary | ICD-10-CM

## 2023-06-30 PROCEDURE — 97750 PHYSICAL PERFORMANCE TEST: CPT | Mod: 32

## 2023-06-30 NOTE — PROGRESS NOTES
"[facility] Treatment      Name: Darek Mcnair  Clinic Number: [id number]    Therapy Diagnosis:   Encounter Diagnoses   Name Primary?    Poor posture Yes    Decreased strength of trunk and back      Physician: Chata Kilgore, *    Visit Date: 2023  Physician Orders: PT Eval and Treat   Medical Diagnosis from Referral: M54.50,G89.29 (ICD-10-CM) - Chronic left-sided low back pain without sciatica  Evaluation Date: 2023  Authorization Period Expiration: 23  Plan of Care Expiration: 23  Reassessment Due:23  Visit # / Visits authorized:     Time In: 2:25 pm  Time Out: 3:35 pm  Total Billable Time: 55 minutes  Insurance:[employer] Benefit Patient     Precautions: Standard     Pattern of pain determined: 1PEN     Subjective   Darek reports mild L lower back/ QL and ITB discomfort/stiffness but slightly improved overall.    Patient reports tolerating previous visit : Minimal muscular soreness  Patient reports their pain to be 1/10 on a 0-10 scale with 0 being no pain and 10 being the worst pain imaginable.  Pain Location: LLB/quadratus  Social History:  lives with their spouse  Occupation: Works in [employer], sitting and standing  Leisure: fish/running /golf   Pts goals: "Get rid of pain"    Objective     Baseline Isometric Testing on Med X equipment: Testing administered by PT  Date of testin23  ROM 6-54 deg   Max Peak Torque 226    Min Peak Torque 119    Flex/Ext Ratio 1.8:1   % below normative data 16          Limitation/Restriction for FOTO Lumbar Survey     Therapist reviewed FOTO scores for Darek Mcnair on 2023.   FOTO documents entered into [employer] - see Media section.     Limitation Score: 33%         Treatment    AZUCENA received the treatments listed below:      JJ received neuromuscular education  to isolate and engage spinal stabilization musculature correctly for motor control and coordination to aid in function and posture for 10 " "minutes on the Medical Medx Machine.  Patient performed MedX dynamic exercise with emphasis on spinal muscular control using pacer throughout  active range of motion. Therapist assisted patient in achieving optimal exertion for neural reeducation and endurance training by using the  Cyndi Exertion Rating scale, by instructing the patient to aim for mid range of exertion, performing 15-20 repetitions, slowly, correctly,and safely.     [facility] - Short 6/30/2023   Visit Number 9   VAS Pain Rating 1   Time 5   Lumbar Stretches - Slouch -   Extension in Lying 10   Extension in Standing 10   Manual Therapy -   Femur Restraint -   Top Dead Center -   Counterweight -   Lumbar Flexion 54   Lumbar Extension 0   Lumbar Peak Torque -   Lumbar Weight 89   Repetitions 20   Rating of Perceived Exertion 7      therapeutic exercises to develop strength, endurance, ROM, flexibility, posture, and core stabilization for 45 minutes including:    LTR x 10  Open book  x 5 for mobility and 5 with GTB for strengthening  Supine rotational QL stretch 5 x 10 sec  HSS w/strap 2 x 20 sec   PPT x 10-NP  Bridging w/BTB ( AROM ER at top) x 10  Bridging c/ BTB with sports cord for UE pull down x 10  EIL x 10  Cat/cow x 10  Bird dog x 10  Seated extension with foam roll 10x-NP  SOC x 10-NP  Paloff press on Inspire cable cross 20# x 15 reps  Cable cross chop downs with long bar 20# x 10 each  +Cable cross lifts in 1/2 kneeling 15# x 10 each  Standing ITB stretch (L) 3 x 20"-NP    Peripheral muscle strengthening which included 1 set of 15-20 repetitions at a slow, controlled 10-13 second per rep pace focused on strengthening supporting musculature for improved body mechanics and functional mobility.  Pt and therapist focused on proper form during treatment to ensure optimal strengthening of each targeted muscle group.  Machines were utilized including torso rotation, leg press, hip abd and hip add, leg ext.  Leg curl, triceps, biceps, chest " and row added visit 3    manual therapy techniques: minutes  total to include: Stick massage to (L) ITB STM with manual therapy techniques x 5 min--NP  .   cold pack for 5 minutes to LB.    Home Exercises Provided and Patient Education Provided   Home exercises include: Standing quadratus stretch  LTR  EIS  Open book  Cardio program: visit 5 6/16/23 benefits of cardio handout provided.  Lifting education date:visit 11  Posture/Lumbar roll: yes, pt states he feels it is more uncomfortable  Fr[employer] Discharge handout (date given):  Equipment at home/gym membership: yes    Education provided:   - Cues w/ex's    Written Home Exercises Provided: Patient instructed to cont prior HEP.  Exercises were reviewed and JJ was able to demonstrate them prior to the end of the session.  JJ demonstrated good  understanding of the education provided.     See EMR under Patient Instructions for exercises provided prior visit.    Assessment   JJ returns with mild (L) lower back/QL/ITB stiffness that is slightly improved overall. Treatment continued with lumbopelvic/core flexibility and strengthening ex's. Added cable cross lifts for progressive core strengthening. Lumbar MedX extension ROM was progressed to 0 degrees and resistance was maintained at to 89 ft/lbs completing 20 reps with a RPE = 7/10.  He was able to complete the full circuit of peripheral strengthening ex's within appropriate muscular fatigue without increased pain. Will look to progress per HB protocol and patient tolerance. MedX retest next visit and also consider dry needling.     Patient is making good progress towards established goals.    Pt will continue to benefit from skilled outpatient physical therapy to address the deficits stated in the impairment chart, provide pt/family education and to maximize pt's level of independence in the home and community environment.     Anticipated Barriers for therapy: none  Pt's spiritual, cultural and educational needs  considered and pt agreeable to plan of care and goals as stated below:    GOALS: Pt is in agreement with the following goals.     Short term goals:  6 weeks or 10 visits   1.  Pt will demonstrate increased lumbar ROM by at least 3 degrees from the initial ROM value with improvements noted in functional ROM and ability to perform ADLs.  (approp and ongoing)  2.  Pt will demonstrate increased MedX average isometric strength value  by 20% from initial test resulting in improved ability to perform bending, lifting, and carrying activities safely, confidently.  (approp and ongoing)  3.  Patient report a reduction in worst pain score by 1-2 points for improved tolerance for sitting.  (approp and ongoing)  4.  Pt able to perform HEP correctly with minimal cueing or supervision from therapist to encourage independent management of symptoms. (approp and ongoing)      Long term goals: 10 weeks or 20 visits   1. Pt will demonstrate increased lumbar ROM by at least 6 degrees from initial ROM value, resulting in improved ability to perform functional fwd bending while standing and sitting. (approp and ongoing)  2. Pt will demonstrate increased MedX average isometric strength value  by 40% from initial test resulting in improved ability to perform bending, lifting, and carrying activities safely, confidently.  (approp and ongoing)  3. Pt to demonstrate ability to independently control and reduce their pain through posture positioning and mechanical movements throughout a typical day.  (approp and ongoing)  4.  Pt will demonstrate reduced pain and improved functional outcomes as reported on the FOTO by reaching a limitation score of < or = 30% or less in order to demonstrate subjective improvement in pt's condition.    (approp and ongoing)  5. Pt will demonstrate independence with the HEP at discharge  (approp and ongoing)  6.  Pt(patient goal)will report no pain or stiffness with driving > 1 hour  (approp and ongoing)     Plan    Continue with established Plan of Care towards established PT goals.     Therapist: Darek Montes, PTA  6/30/2023

## 2023-06-30 NOTE — PROGRESS NOTES
PT/PTA met face to face to discuss pt's treatment plan and progress towards established goals. Pt will be seen by a physical therapist minimally every 6th visit or every 30 days.    Darek Montes PTA

## 2023-07-05 ENCOUNTER — CLINICAL SUPPORT (OUTPATIENT)
Dept: REHABILITATION | Facility: HOSPITAL | Age: 48
End: 2023-07-05
Payer: COMMERCIAL

## 2023-07-05 DIAGNOSIS — R29.3 POOR POSTURE: Primary | ICD-10-CM

## 2023-07-05 DIAGNOSIS — R29.898 DECREASED STRENGTH OF TRUNK AND BACK: ICD-10-CM

## 2023-07-05 PROCEDURE — 97750 PHYSICAL PERFORMANCE TEST: CPT | Mod: 32

## 2023-07-05 NOTE — PROGRESS NOTES
"[facility]      Name: Darek Mcnair  Clinic Number: [id number]    Therapy Diagnosis:   Encounter Diagnoses   Name Primary?    Poor posture Yes    Decreased strength of trunk and back      Physician: Chata Kilgore, *    Visit Date: 2023  Physician Orders: PT Eval and Treat   Medical Diagnosis from Referral: M54.50,G89.29 (ICD-10-CM) - Chronic left-sided low back pain without sciatica  Evaluation Date: 2023  Authorization Period Expiration: 23  Plan of Care Expiration: 23  Reassessment Due:23  Visit # / Visits authorized:10 /20     Time In: 655  Time Out: 8  Total Billable Time: 60 minutes  Insurance:[employer] Benefit Patient     Precautions: Standard     Pattern of pain determined: 1PEN     Subjective   Darek reports some continued tightness and soreness in L QL    Patient reports tolerating previous visit : Minimal muscular soreness  Patient reports their pain to be 1/10 on a 0-10 scale with 0 being no pain and 10 being the worst pain imaginable.  Pain Location: LLB/quadratus  Social History:  lives with their spouse  Occupation: Works in [employer], sitting and standing  Leisure: fish/running /golf   Pts goals: "Get rid of pain"    Objective     Baseline Isometric Testing on Med X equipment: Testing administered by PT  Date of testin23  ROM 6-54 deg   Max Peak Torque 226    Min Peak Torque 119    Flex/Ext Ratio 1.8:1   % below normative data 16       Mid point Isometric Testing on Med X equipment: Testing administered by PT  Date of testin23  ROM 0-54 deg   Max Peak Torque 237   Min Peak Torque 161   Flex/Ext Ratio 1.4:1   % below normative data 6       5%strength increase    Limitation/Restriction for FOTO Lumbar Survey     Therapist reviewed FOTO scores for Darek Mcnair on 2023.   FOTO documents entered into [employer] - see Media section.     Limitation Score: 33%   REGINALDO 12% visit 10      Treatment    AZUCENA received the " "treatments listed below:      AZUCENA received neuromuscular education  to isolate and engage spinal stabilization musculature correctly for motor control and coordination to aid in function and posture for 10 minutes on the Medical Medx Machine.  Patient performed MedX dynamic exercise with emphasis on spinal muscular control using pacer throughout  active range of motion. Therapist assisted patient in achieving optimal exertion for neural reeducation and endurance training by using the  Cyndi Exertion Rating scale, by instructing the patient to aim for mid range of exertion, performing 15-20 repetitions, slowly, correctly,and safely.     [facility] 7/5/2023   Visit Number 10   VAS Pain Rating 1   Time 5   Lumbar Stretches - Slouch Overcorrection -   Extension in Lying 10   Extension in Standing 10   Manual Therapy 5   Femur Restraint -   Top Dead Center -   Counterweight -   Lumbar Flexion -   Lumbar Extension -   Lumbar Peak Torque 237   Min Torque 161   Test Percent Below Normative Data 6   Test Percent Gain in Strength from Initial  5   Lumbar Weight -   Repetitions -   Rating of Perceived Exertion -   Ice - Z Lie (in min.) 0       therapeutic exercises to develop strength, endurance, ROM, flexibility, posture, and core stabilization for 50 minutes including:    LTR x 10  Open book  x 5 for mobility and 5 with GTB for strengthening  Supine rotational QL stretch 5 x 10 sec  HSS w/strap 2 x 20 sec   PPT x 10-NP  Bridging w/BTB ( AROM ER at top) x 10  Bridging c/ BTB with sports cord for UE pull down x 10  EIL x 10  Cat/cow x 10  Bird dog x 10  Seated extension with foam roll 10x-NP  SOC x 10-NP  Paloff press on Inspire cable cross 20# x 15 reps  ,  Standing ITB stretch (L) 3 x 20"-NP    Peripheral muscle strengthening which included 1 set of 15-20 repetitions at a slow, controlled 10-13 second per rep pace focused on strengthening supporting musculature for improved body mechanics and functional mobility.  Pt " and therapist focused on proper form during treatment to ensure optimal strengthening of each targeted muscle group.  Machines were utilized including torso rotation, leg press, hip abd and hip add, leg ext.  Leg curl, triceps, biceps, chest and row added visit 3    manual therapy techniques: FDN L quadratus in R SL with pillow under R hip to increase RSB, 3 needles, 2 inch, followed by MHP x 5 min  .   cold pack for 0minutes to LB.    Home Exercises Provided and Patient Education Provided   Home exercises include: Standing quadratus stretch  LTR  EIS  Open book  Cardio program: visit 5 6/16/23 benefits of cardio handout provided.  Lifting education date:visit 11  Posture/Lumbar roll: yes, pt states he feels it is more uncomfortable  Fridge [employer] Discharge handout (date given):  Equipment at home/gym membership: yes    Education provided:   - Cues w/ex's    Written Home Exercises Provided: Patient instructed to cont prior HEP.  Exercises were reviewed and JJ was able to demonstrate them prior to the end of the session.  JJ demonstrated good  understanding of the education provided.     See EMR under Patient Instructions for exercises provided prior visit.    Assessment   Patient has attended 10 visits at [facility] which included MD evaluation, PT evaluation with isometric testing, and physical therapy treatment including HEP instruction, education, aerobic activity, dynamic strengthening on MedX equipment for the spine, and whole body strengthening on MedX equipment with increasing resistance. Patient demonstrates increased ability to reduce symptoms, improve posture, improve ROM, and improve strength, as stated below:      -Improved 6 ROM, initially on MedX test 6-54 and currently 0-54.  -Improved strength at each test point on lumbar MedX isometric test with 5 average improvement noted with reduced pain noted by patient.  -Initial outcome tool score 33 and current outcome tool score 12 indicating  reduced pain and improved function.     Following exercises pt signed consent for FDN.  FDN performed to L quadratus in R SL.  No adverse effects noted.  MHP applied following Needling.  Reassess effectiveness next visit,continue PRN    Patient is making good progress towards established goals.    Pt will continue to benefit from skilled outpatient physical therapy to address the deficits stated in the impairment chart, provide pt/family education and to maximize pt's level of independence in the home and community environment.     Anticipated Barriers for therapy: none  Pt's spiritual, cultural and educational needs considered and pt agreeable to plan of care and goals as stated below:    GOALS: Pt is in agreement with the following goals.     Short term goals:  6 weeks or 10 visits   1.  Pt will demonstrate increased lumbar ROM by at least 3 degrees from the initial ROM value with improvements noted in functional ROM and ability to perform ADLs.  MET  2.  Pt will demonstrate increased MedX average isometric strength value  by 20% from initial test resulting in improved ability to perform bending, lifting, and carrying activities safely, confidently.  (approp and ongoing)  3.  Patient report a reduction in worst pain score by 1-2 points for improved tolerance for sitting.  (approp and ongoing)  4.  Pt able to perform HEP correctly with minimal cueing or supervision from therapist to encourage independent management of symptoms. (approp and ongoing)      Long term goals: 10 weeks or 20 visits   1. Pt will demonstrate increased lumbar ROM by at least 6 degrees from initial ROM value, resulting in improved ability to perform functional fwd bending while standing and sitting.MET  2. Pt will demonstrate increased MedX average isometric strength value  by 40% from initial test resulting in improved ability to perform bending, lifting, and carrying activities safely, confidently.  (approp and ongoing)  3. Pt to  demonstrate ability to independently control and reduce their pain through posture positioning and mechanical movements throughout a typical day.  (approp and ongoing)  4.  Pt will demonstrate reduced pain and improved functional outcomes as reported on the FOTO by reaching a limitation score of < or = 30% or less in order to demonstrate subjective improvement in pt's condition.    (approp and ongoing)  5. Pt will demonstrate independence with the HEP at discharge  (approp and ongoing)  6.  Pt(patient goal)will report no pain or stiffness with driving > 1 hour  (approp and ongoing)     Plan   Continue with established Plan of Care towards established PT goals.     Therapist: Mercedes Conteh, PT  7/5/2023

## 2023-07-07 ENCOUNTER — CLINICAL SUPPORT (OUTPATIENT)
Dept: REHABILITATION | Facility: HOSPITAL | Age: 48
End: 2023-07-07
Payer: COMMERCIAL

## 2023-07-07 DIAGNOSIS — R29.898 DECREASED STRENGTH OF TRUNK AND BACK: ICD-10-CM

## 2023-07-07 DIAGNOSIS — R29.3 POOR POSTURE: Primary | ICD-10-CM

## 2023-07-07 PROCEDURE — 97750 PHYSICAL PERFORMANCE TEST: CPT | Mod: 32

## 2023-07-07 NOTE — PROGRESS NOTES
"[facility]      Name: Darek Mcnair  Clinic Number: [id number]    Therapy Diagnosis:   Encounter Diagnoses   Name Primary?    Poor posture Yes    Decreased strength of trunk and back        Physician: Chata Kilgore, *    Visit Date: 2023  Physician Orders: PT Eval and Treat   Medical Diagnosis from Referral: M54.50,G89.29 (ICD-10-CM) - Chronic left-sided low back pain without sciatica  Evaluation Date: 2023  Authorization Period Expiration: 23  Plan of Care Expiration: 23  Reassessment Due:23  Visit # / Visits authorized:     Time In: 1:30 PM  Time Out:2:35 PM  Total Billable Time: 60 minutes  Insurance:[employer] Benefit Patient     Precautions: Standard     Pattern of pain determined: 1PEN     Subjective   Darek reports that initial dry needling treatment from last session appeared helpful to reduce soreness/tightness in (L) lower back/QL area.       Patient reports tolerating previous visit : Minimal muscular soreness  Patient reports their pain to be 1/10 on a 0-10 scale with 0 being no pain and 10 being the worst pain imaginable.  Pain Location: LLB/quadratus  Social History:  lives with their spouse  Occupation: Works in [employer], sitting and standing  Leisure: fish/running /golf   Pts goals: "Get rid of pain"    Objective     Baseline Isometric Testing on Med X equipment: Testing administered by PT  Date of testin23  ROM 6-54 deg   Max Peak Torque 226    Min Peak Torque 119    Flex/Ext Ratio 1.8:1   % below normative data 16       Mid point Isometric Testing on Med X equipment: Testing administered by PT  Date of testin23  ROM 0-54 deg   Max Peak Torque 237   Min Peak Torque 161   Flex/Ext Ratio 1.4:1   % below normative data 6       5%strength increase    Limitation/Restriction for FOTO Lumbar Survey     Therapist reviewed FOTO scores for Darek Mcnair on 2023.   FOTO documents entered into [employer] - see " Media section.     Limitation Score: 33%   REGINALDO 12% visit 10      Treatment    AZUCENA received the treatments listed below:      AZUCENA received neuromuscular education  to isolate and engage spinal stabilization musculature correctly for motor control and coordination to aid in function and posture for 10 minutes on the Medical Medx Machine.  Patient performed MedX dynamic exercise with emphasis on spinal muscular control using pacer throughout  active range of motion. Therapist assisted patient in achieving optimal exertion for neural reeducation and endurance training by using the  Cyndi Exertion Rating scale, by instructing the patient to aim for mid range of exertion, performing 15-20 repetitions, slowly, correctly,and safely.     [facility] - Short 7/7/2023   Visit Number 11   VAS Pain Rating 1   Time 5   Lumbar Stretches - Slouch -   Extension in Lying 10   Extension in Standing 10   Manual Therapy -   Femur Restraint -   Top Dead Center -   Counterweight -   Lumbar Flexion -   Lumbar Extension -   Lumbar Peak Torque -   Lumbar Weight 93   Repetitions 15   Rating of Perceived Exertion 5      therapeutic exercises to develop strength, endurance, ROM, flexibility, posture, and core stabilization for 50 minutes including:    LTR x 10 (arms overhead)  Open book  x 5 for mobility and 5 with GTB for strengthening  Supine rotational QL stretch 5 x 10 sec  HSS w/strap 2 x 20 sec   PPT x 10-NP  Bridging w/BTB ( AROM ER at top) x 15  Bridging c/ BTB with sports cord for UE pull down x 10--NP  EIL x 10  Cat/cow x 10  Bird dog +Sports cord resistance x 10  Seated extension with foam roll 10x-NP  SOC x 10-NP  Paloff press on Inspire cable cross 20# x 15 reps--NP  Cable cross lifts in 1/2 kneeling 15# x 10 each  Cable cross chop downs with long bar 20# x 15 each  +Lifting education   Floor<->waist lift of 15# medicine ball   Golfer's Lift     Peripheral muscle strengthening which included 1 set of 15-20 repetitions at a  slow, controlled 10-13 second per rep pace focused on strengthening supporting musculature for improved body mechanics and functional mobility.  Pt and therapist focused on proper form during treatment to ensure optimal strengthening of each targeted muscle group.  Machines were utilized including torso rotation, leg press, hip abd and hip add, leg ext.  Leg curl, triceps, biceps, chest and row added visit 3    manual therapy techniques: FDN L quadratus in R SL with pillow under R hip to increase RSB, 3 needles, 2 inch, followed by MHP x 5 min---NP  .   cold pack for 0minutes to LB.    Home Exercises Provided and Patient Education Provided   Home exercises include: Standing quadratus stretch,LTR,EIS,Open book  Cardio program: visit 5 6/16/23 benefits of cardio handout provided.  Lifting education date:visit 11, 7/7/23 do's and don'ts of lifting handout provided  Posture/Lumbar roll: yes, pt states he feels it is more uncomfortable  Fridge Magnet Discharge handout (date given):  Equipment at home/gym membership: yes    Education provided:   - Cues w/ex's    Written Home Exercises Provided: Patient instructed to cont prior HEP.  Exercises were reviewed and AZUCENA was able to demonstrate them prior to the end of the session.  EDIEJ demonstrated good  understanding of the education provided.     See EMR under Patient Instructions for exercises provided prior visit.    Assessment   JJ returns with mild (L) lower back/QL/ITB stiffness that is slightly improved overall after dry needling treatment last visit. Treatment continued with lumbopelvic/core neuromuscular re-education, flexibility and strengthening ex's. He was progressed with sports cord resistance for bird dog ex.  He was also educated on the do's and don'ts of lifting this visit demonstrating good understanding and ability after instruction. Lumbar MedX resistance was increased to 93 ft/lbs completing 15 reps with a RPE = 5/10. He was able to complete the full circuit  of peripheral strengthening ex's within appropriate muscular fatigue without increased pain. Will look to progress per HB protocol and patient tolerance. Will also look to resume dry needling treatment next week.    Patient is making good progress towards established goals.    Pt will continue to benefit from skilled outpatient physical therapy to address the deficits stated in the impairment chart, provide pt/family education and to maximize pt's level of independence in the home and community environment.     Anticipated Barriers for therapy: none  Pt's spiritual, cultural and educational needs considered and pt agreeable to plan of care and goals as stated below:    GOALS: Pt is in agreement with the following goals.     Short term goals:  6 weeks or 10 visits   1.  Pt will demonstrate increased lumbar ROM by at least 3 degrees from the initial ROM value with improvements noted in functional ROM and ability to perform ADLs.  MET  2.  Pt will demonstrate increased MedX average isometric strength value  by 20% from initial test resulting in improved ability to perform bending, lifting, and carrying activities safely, confidently.  (approp and ongoing)  3.  Patient report a reduction in worst pain score by 1-2 points for improved tolerance for sitting.  (approp and ongoing)  4.  Pt able to perform HEP correctly with minimal cueing or supervision from therapist to encourage independent management of symptoms. (approp and ongoing)      Long term goals: 10 weeks or 20 visits   1. Pt will demonstrate increased lumbar ROM by at least 6 degrees from initial ROM value, resulting in improved ability to perform functional fwd bending while standing and sitting.MET  2. Pt will demonstrate increased MedX average isometric strength value  by 40% from initial test resulting in improved ability to perform bending, lifting, and carrying activities safely, confidently.  (approp and ongoing)  3. Pt to demonstrate ability to  independently control and reduce their pain through posture positioning and mechanical movements throughout a typical day.  (approp and ongoing)  4.  Pt will demonstrate reduced pain and improved functional outcomes as reported on the FOTO by reaching a limitation score of < or = 30% or less in order to demonstrate subjective improvement in pt's condition.    (approp and ongoing)  5. Pt will demonstrate independence with the HEP at discharge  (approp and ongoing)  6.  Pt(patient goal)will report no pain or stiffness with driving > 1 hour  (approp and ongoing)     Plan   Continue with established Plan of Care towards established PT goals.     Therapist: Darek Montes, PTA  7/7/2023

## 2023-07-12 ENCOUNTER — CLINICAL SUPPORT (OUTPATIENT)
Dept: REHABILITATION | Facility: HOSPITAL | Age: 48
End: 2023-07-12
Payer: COMMERCIAL

## 2023-07-12 DIAGNOSIS — R29.3 POOR POSTURE: Primary | ICD-10-CM

## 2023-07-12 DIAGNOSIS — R29.898 DECREASED STRENGTH OF TRUNK AND BACK: ICD-10-CM

## 2023-07-12 PROCEDURE — 97750 PHYSICAL PERFORMANCE TEST: CPT | Mod: 32

## 2023-07-12 NOTE — PROGRESS NOTES
"[facility]      Name: Darek Mcnair  Clinic Number: [id number]    Therapy Diagnosis:   Encounter Diagnoses   Name Primary?    Poor posture Yes    Decreased strength of trunk and back        Physician: Chata Kilgore, *    Visit Date: 2023  Physician Orders: PT Eval and Treat   Medical Diagnosis from Referral: M54.50,G89.29 (ICD-10-CM) - Chronic left-sided low back pain without sciatica  Evaluation Date: 2023  Authorization Period Expiration: 23  Plan of Care Expiration: 23  Reassessment Due:23  Visit # / Visits authorized:     Time In: 655  Time Out:750  Total Billable Time: 50minutes  Insurance:[employer] Benefit Patient     Precautions: Standard     Pattern of pain determined: 1PEN     Subjective   Darek reports FDN helped reduce AM stiffness and soreness  Patient reports tolerating previous visit : Minimal muscular soreness  Patient reports their pain to be 3/10 on a 0-10 scale with 0 being no pain and 10 being the worst pain imaginable.  Pain Location: LLB/quadratus  Social History:  lives with their spouse  Occupation: Works in [employer] tech, sitting and standing  Leisure: fish/running /golf   Pts goals: "Get rid of pain"    Objective     Baseline Isometric Testing on Med X equipment: Testing administered by PT  Date of testin23  ROM 6-54 deg   Max Peak Torque 226    Min Peak Torque 119    Flex/Ext Ratio 1.8:1   % below normative data 16       Mid point Isometric Testing on Med X equipment: Testing administered by PT  Date of testin23  ROM 0-54 deg   Max Peak Torque 237   Min Peak Torque 161   Flex/Ext Ratio 1.4:1   % below normative data 6       5%strength increase    Limitation/Restriction for FOTO Lumbar Survey     Therapist reviewed FOTO scores for Darek Mcnair on 2023.   FOTO documents entered into [employer] - see Media section.     Limitation Score: 33%   REGINALDO 12% visit 10      Treatment    AZUCENA received the " treatments listed below:      AZUCENA received neuromuscular education  to isolate and engage spinal stabilization musculature correctly for motor control and coordination to aid in function and posture for 10 minutes on the Medical Medx Machine.  Patient performed MedX dynamic exercise with emphasis on spinal muscular control using pacer throughout  active range of motion. Therapist assisted patient in achieving optimal exertion for neural reeducation and endurance training by using the  Cyndi Exertion Rating scale, by instructing the patient to aim for mid range of exertion, performing 15-20 repetitions, slowly, correctly,and safely.   [facility] 7/12/2023   Visit Number 12   VAS Pain Rating 3   Time 5   Lumbar Stretches - Slouch Overcorrection -   Extension in Lying 10   Extension in Standing 10   Manual Therapy 5   Femur Restraint -   Top Dead Center -   Counterweight -   Lumbar Flexion -   Lumbar Extension -   Lumbar Peak Torque -   Min Torque -   Test Percent Below Normative Data -   Test Percent Gain in Strength from Initial  -   Lumbar Weight 93   Repetitions 20   Rating of Perceived Exertion 5   Ice - Z Lie (in min.) 0       therapeutic exercises to develop strength, endurance, ROM, flexibility, posture, and core stabilization for 45 minutes including:    LTR x 10 (arms overhead)  Open book  x 5 for mobility and 5 with GTB for strengthening  Supine rotational QL stretch 5 x 10 sec  HSS w/strap 2 x 30 sec   Bridging w/BTB ( AROM ER at top) x 15  Bridging c/ BTB with sports cord for UE pull down x 10--NP  EIL x 10  Cat/cow x 10  Bird dog +Sports cord resistance x 10    Paloff press on Inspire cable cross 20# x 15 reps--NP  Cable cross lifts in 1/2 kneeling 15# x 10 each  Cable cross chop downs with long bar 20# x 15 each       Peripheral muscle strengthening which included 1 set of 15-20 repetitions at a slow, controlled 10-13 second per rep pace focused on strengthening supporting musculature for improved  body mechanics and functional mobility.  Pt and therapist focused on proper form during treatment to ensure optimal strengthening of each targeted muscle group.  Machines were utilized including torso rotation, leg press, hip abd and hip add, leg ext.  Leg curl, triceps, biceps, chest and row added visit 3    manual therapy techniques: FDN L quadratus in R SL with pillow under R hip to increase RSB, 3 needles, 2 inch, followed by MHP x 5 min  .   cold pack for 0minutes to LB.  MHP x 5  Home Exercises Provided and Patient Education Provided   Home exercises include: Standing quadratus stretch,LTR,EIS,Open book  Cardio program: visit 5 6/16/23 benefits of cardio handout provided.  Lifting education date:visit 11, 7/7/23 do's and don'ts of lifting handout provided  Posture/Lumbar roll: yes, pt states he feels it is more uncomfortable  Fridge Magnet Discharge handout (date given):  Equipment at home/gym membership: yes    Education provided:   - Cues w/ex's    Written Home Exercises Provided: Patient instructed to cont prior HEP.  Exercises were reviewed and JJ was able to demonstrate them prior to the end of the session.  JJ demonstrated good  understanding of the education provided.     See EMR under Patient Instructions for exercises provided prior visit.    Assessment   JJ returns with mild (L) lower back/QL stiffness and pain.  Reports improvement after FDN, in terms of reduction in stiffness for several days following.  FDN performed again today after exercise without adverse effects to L QL.  Lumbo pelvic stability exercises continued and unchanged from last session.  Pt able to complete 20 reps at 93ft/lbs with 5/10 exertion level.  Inc 5% next visit.  Patient is making good progress towards established goals.  Pt will continue to benefit from skilled outpatient physical therapy to address the deficits stated in the impairment chart, provide pt/family education and to maximize pt's level of independence in the  home and community environment.     Anticipated Barriers for therapy: none  Pt's spiritual, cultural and educational needs considered and pt agreeable to plan of care and goals as stated below:    GOALS: Pt is in agreement with the following goals.     Short term goals:  6 weeks or 10 visits   1.  Pt will demonstrate increased lumbar ROM by at least 3 degrees from the initial ROM value with improvements noted in functional ROM and ability to perform ADLs.  MET  2.  Pt will demonstrate increased MedX average isometric strength value  by 20% from initial test resulting in improved ability to perform bending, lifting, and carrying activities safely, confidently.  (approp and ongoing)  3.  Patient report a reduction in worst pain score by 1-2 points for improved tolerance for sitting.  (approp and ongoing)  4.  Pt able to perform HEP correctly with minimal cueing or supervision from therapist to encourage independent management of symptoms. (approp and ongoing)      Long term goals: 10 weeks or 20 visits   1. Pt will demonstrate increased lumbar ROM by at least 6 degrees from initial ROM value, resulting in improved ability to perform functional fwd bending while standing and sitting.MET  2. Pt will demonstrate increased MedX average isometric strength value  by 40% from initial test resulting in improved ability to perform bending, lifting, and carrying activities safely, confidently.  (approp and ongoing)  3. Pt to demonstrate ability to independently control and reduce their pain through posture positioning and mechanical movements throughout a typical day.  (approp and ongoing)  4.  Pt will demonstrate reduced pain and improved functional outcomes as reported on the FOTO by reaching a limitation score of < or = 30% or less in order to demonstrate subjective improvement in pt's condition.    (approp and ongoing)  5. Pt will demonstrate independence with the HEP at discharge  (approp and ongoing)  6.  Pt(patient  goal)will report no pain or stiffness with driving > 1 hour  (approp and ongoing)     Plan   Continue with established Plan of Care towards established PT goals.     Therapist: Mercedes Conteh, PT  7/12/2023

## 2023-07-14 ENCOUNTER — CLINICAL SUPPORT (OUTPATIENT)
Dept: REHABILITATION | Facility: HOSPITAL | Age: 48
End: 2023-07-14
Payer: COMMERCIAL

## 2023-07-14 DIAGNOSIS — R29.3 POOR POSTURE: Primary | ICD-10-CM

## 2023-07-14 DIAGNOSIS — R29.898 DECREASED STRENGTH OF TRUNK AND BACK: ICD-10-CM

## 2023-07-14 PROCEDURE — 97750 PHYSICAL PERFORMANCE TEST: CPT | Mod: 32

## 2023-07-14 NOTE — PROGRESS NOTES
"[facility]      Name: Darek Mcnair  Clinic Number: [id number]    Therapy Diagnosis:   Encounter Diagnoses   Name Primary?    Poor posture Yes    Decreased strength of trunk and back        Physician: Chata Kilgore, *    Visit Date: 2023  Physician Orders: PT Eval and Treat   Medical Diagnosis from Referral: M54.50,G89.29 (ICD-10-CM) - Chronic left-sided low back pain without sciatica  Evaluation Date: 2023  Authorization Period Expiration: 23  Plan of Care Expiration: 23  Reassessment Due:23  Visit # / Visits authorized:     Time In: 1:30 PM  Time Out: 2:30 PM   Total Billable Time: 55 minutes  Insurance:[employer] Benefit Patient     Precautions: Standard     Pattern of pain determined: 1PEN     Subjective   Darek reports that he continues to feel a little better after dry needling treatments.     Patient reports tolerating previous visit : Minimal muscular soreness  Patient reports their pain to be 1/10 on a 0-10 scale with 0 being no pain and 10 being the worst pain imaginable.  Pain Location: LLB/quadratus  Social History:  lives with their spouse  Occupation: Works in [employer], sitting and standing  Leisure: fish/running /golf   Pts goals: "Get rid of pain"    Objective     Baseline Isometric Testing on Med X equipment: Testing administered by PT  Date of testin23  ROM 6-54 deg   Max Peak Torque 226    Min Peak Torque 119    Flex/Ext Ratio 1.8:1   % below normative data 16       Mid point Isometric Testing on Med X equipment: Testing administered by PT  Date of testin23  ROM 0-54 deg   Max Peak Torque 237   Min Peak Torque 161   Flex/Ext Ratio 1.4:1   % below normative data 6       5%strength increase    Limitation/Restriction for FOTO Lumbar Survey     Therapist reviewed FOTO scores for Darek Mcnair on 2023.   FOTO documents entered into [employer] - see Media section.     Limitation Score: 33%   REGINALDO 12% " visit 10      Treatment    AZUCENA received the treatments listed below:      AZUCENA received neuromuscular education  to isolate and engage spinal stabilization musculature correctly for motor control and coordination to aid in function and posture for 10 minutes on the Medical Medx Machine.  Patient performed MedX dynamic exercise with emphasis on spinal muscular control using pacer throughout  active range of motion. Therapist assisted patient in achieving optimal exertion for neural reeducation and endurance training by using the  Cyndi Exertion Rating scale, by instructing the patient to aim for mid range of exertion, performing 15-20 repetitions, slowly, correctly,and safely.     [facility] - Short 7/14/2023   Visit Number 13   VAS Pain Rating 1   Time 5   Lumbar Stretches - Slouch -   Extension in Lying 10   Extension in Standing 10   Manual Therapy -   Femur Restraint -   Top Dead Center -   Counterweight -   Lumbar Flexion -   Lumbar Extension -   Lumbar Peak Torque -   Lumbar Weight 98   Repetitions 15   Rating of Perceived Exertion 5      therapeutic exercises to develop strength, endurance, ROM, flexibility, posture, and core stabilization for 45 minutes including:    LTR x 10 (arms overhead)  Open book  x 5 for mobility and 5 with GTB for strengthening  Supine rotational QL stretch 5 x 10 sec  HSS w/strap 2 x 30 sec --NP  +Dying bug w/T-ball 2 x 5  Bridging w/BTB ( AROM ER at top) x 15  Bridging c/ BTB with sports cord for UE pull down x 10--NP  EIL x 10  Cat/cow x 10  Bird dog +Sports cord resistance x 10  + Lateral step outs to Paloff press on Inspire cable cross 20# x 10 reps   Cable cross lifts in 1/2 kneeling 20# x 10 each  Cable cross chop downs with long bar 20# x 15 each--NP  +Standing QL stretch (L) (corner wall) 5 x 10 sec  EIS x 10       Peripheral muscle strengthening which included 1 set of 15-20 repetitions at a slow, controlled 10-13 second per rep pace focused on strengthening supporting  musculature for improved body mechanics and functional mobility.  Pt and therapist focused on proper form during treatment to ensure optimal strengthening of each targeted muscle group.  Machines were utilized including torso rotation, leg press, hip abd and hip add, leg ext.  Leg curl, triceps, biceps, chest and row added visit 3    manual therapy techniques: FDN L quadratus in R SL with pillow under R hip to increase RSB, 3 needles, 2 inch, followed by MHP x 5 min--NP  .   cold pack for 5 minutes to LB.    Home Exercises Provided and Patient Education Provided   Home exercises include: Standing quadratus stretch,LTR,EIS,Open book  Cardio program: visit 5 6/16/23 benefits of cardio handout provided.  Lifting education date:visit 11, 7/7/23 do's and don'ts of lifting handout provided  Posture/Lumbar roll: yes, pt states he feels it is more uncomfortable  Fridge Magnet Discharge handout (date given):  Equipment at home/gym membership: yes    Education provided:   - Cues w/ex's    Written Home Exercises Provided: Patient instructed to cont prior HEP.  Exercises were reviewed and AZUCENA was able to demonstrate them prior to the end of the session.  AZUCENA demonstrated good  understanding of the education provided.     See EMR under Patient Instructions for exercises provided prior visit.    Assessment   AZUCENA returns with mild (L) lower back/QL stiffness and discomfort. He reports that he continues to respond well to FDN treatments with reduction in stiffness. Treatment continued with lumbopelvic/core neuromuscular re-education, flexibility and strengthening ex's.  Added drying bug w/T-ball for additional core strengthening requiring some cues for coordination but able to perform without c/o pain. Lumbar MedX resistance was increased to 98 ft/lbs completing 15 reps with a RPE = 5/10. He was able to complete the full circuit of peripheral strengthening ex's within appropriate muscular fatigue without increased pain. Will look to  progress per HB protocol and patient tolerance. Will also look to resume dry needling treatment next week.    Patient is making good progress towards established goals.  Pt will continue to benefit from skilled outpatient physical therapy to address the deficits stated in the impairment chart, provide pt/family education and to maximize pt's level of independence in the home and community environment.     Anticipated Barriers for therapy: none  Pt's spiritual, cultural and educational needs considered and pt agreeable to plan of care and goals as stated below:    GOALS: Pt is in agreement with the following goals.     Short term goals:  6 weeks or 10 visits   1.  Pt will demonstrate increased lumbar ROM by at least 3 degrees from the initial ROM value with improvements noted in functional ROM and ability to perform ADLs.  MET  2.  Pt will demonstrate increased MedX average isometric strength value  by 20% from initial test resulting in improved ability to perform bending, lifting, and carrying activities safely, confidently.  (approp and ongoing)  3.  Patient report a reduction in worst pain score by 1-2 points for improved tolerance for sitting.  (approp and ongoing)  4.  Pt able to perform HEP correctly with minimal cueing or supervision from therapist to encourage independent management of symptoms. (approp and ongoing)      Long term goals: 10 weeks or 20 visits   1. Pt will demonstrate increased lumbar ROM by at least 6 degrees from initial ROM value, resulting in improved ability to perform functional fwd bending while standing and sitting.MET  2. Pt will demonstrate increased MedX average isometric strength value  by 40% from initial test resulting in improved ability to perform bending, lifting, and carrying activities safely, confidently.  (approp and ongoing)  3. Pt to demonstrate ability to independently control and reduce their pain through posture positioning and mechanical movements throughout a  typical day.  (approp and ongoing)  4.  Pt will demonstrate reduced pain and improved functional outcomes as reported on the FOTO by reaching a limitation score of < or = 30% or less in order to demonstrate subjective improvement in pt's condition.    (approp and ongoing)  5. Pt will demonstrate independence with the HEP at discharge  (approp and ongoing)  6.  Pt(patient goal)will report no pain or stiffness with driving > 1 hour  (approp and ongoing)     Plan   Continue with established Plan of Care towards established PT goals.     Therapist: Darek Montes, PTA  7/14/2023

## 2023-07-19 ENCOUNTER — CLINICAL SUPPORT (OUTPATIENT)
Dept: REHABILITATION | Facility: HOSPITAL | Age: 48
End: 2023-07-19
Payer: COMMERCIAL

## 2023-07-19 DIAGNOSIS — R29.3 POOR POSTURE: Primary | ICD-10-CM

## 2023-07-19 DIAGNOSIS — R29.898 DECREASED STRENGTH OF TRUNK AND BACK: ICD-10-CM

## 2023-07-19 PROCEDURE — 97750 PHYSICAL PERFORMANCE TEST: CPT | Mod: 32

## 2023-07-19 NOTE — PROGRESS NOTES
"[facility]      Name: Darek Mcnair  Clinic Number: [id number]    Therapy Diagnosis:   Encounter Diagnoses   Name Primary?    Poor posture Yes    Decreased strength of trunk and back        Physician: Chata Kilgore, *    Visit Date: 2023  Physician Orders: PT Eval and Treat   Medical Diagnosis from Referral: M54.50,G89.29 (ICD-10-CM) - Chronic left-sided low back pain without sciatica  Evaluation Date: 2023  Authorization Period Expiration: 23  Plan of Care Expiration: 23  Reassessment Due:23  Visit # / Visits authorized:     Time In: 7  Time Out: 8  Total Billable Time: 55 minutes  Insurance:[employer] Benefit Patient     Precautions: Standard     Pattern of pain determined: 1PEN     Subjective   Darek reports that he continues to get relief for about a day or 2 following FDN, but relief doesn't last  Patient reports tolerating previous visit : Minimal muscular soreness  Patient reports their pain to be 3/10 on a 0-10 scale with 0 being no pain and 10 being the worst pain imaginable.  Pain Location: LLB/quadratus  Social History:  lives with their spouse  Occupation: Works in [employer] tech, sitting and standing  Leisure: fish/running /golf   Pts goals: "Get rid of pain"    Objective     Baseline Isometric Testing on Med X equipment: Testing administered by PT  Date of testin23  ROM 6-54 deg   Max Peak Torque 226    Min Peak Torque 119    Flex/Ext Ratio 1.8:1   % below normative data 16       Mid point Isometric Testing on Med X equipment: Testing administered by PT  Date of testin23  ROM 0-54 deg   Max Peak Torque 237   Min Peak Torque 161   Flex/Ext Ratio 1.4:1   % below normative data 6       5%strength increase    Limitation/Restriction for FOTO Lumbar Survey     Therapist reviewed FOTO scores for Darek Mcnair on 2023.   FOTO documents entered into [employer] - see Media section.     Limitation Score: 33%   REGINALDO 12% " visit 10      Treatment    AZUCENA received the treatments listed below:      AZUCENA received neuromuscular education  to isolate and engage spinal stabilization musculature correctly for motor control and coordination to aid in function and posture for 10 minutes on the Medical Medx Machine.  Patient performed MedX dynamic exercise with emphasis on spinal muscular control using pacer throughout  active range of motion. Therapist assisted patient in achieving optimal exertion for neural reeducation and endurance training by using the  Cyndi Exertion Rating scale, by instructing the patient to aim for mid range of exertion, performing 15-20 repetitions, slowly, correctly,and safely.   [facility] 7/19/2023   Visit Number 14   VAS Pain Rating 3   Time 5   Lumbar Stretches - Slouch Overcorrection -   Extension in Lying 10   Extension in Standing 10   Manual Therapy 5   Femur Restraint -   Top Dead Center -   Counterweight -   Lumbar Flexion -   Lumbar Extension -   Lumbar Peak Torque -   Min Torque -   Test Percent Below Normative Data -   Test Percent Gain in Strength from Initial  -   Lumbar Weight 98   Repetitions 20   Rating of Perceived Exertion 6   Ice - Z Lie (in min.) -         therapeutic exercises to develop strength, endurance, ROM, flexibility, posture, and core stabilization for 45minutes including:    LTR x 10 (arms overhead)  Open book  x 5 for mobility and 5 with GTB for strengthening  Supine rotational QL stretch 5 x 10 sec  HSS w/strap 2 x 30 sec --NP  +Dying bug w/T-ball 2 x 5  Bridging w/BTB ( AROM ER at top) x 20x  Bridging c/ BTB with sports cord for UE pull down x 10  EIL x 10  Cat/cow x 10  Bird dog +Sports cord resistance x 10  + Lateral step outs to Paloff press on Inspire cable cross 20# x 10 reps   Cable cross lifts in 1/2 kneeling 20# x 10 each N/P  Cable cross chop downs with long bar 20# x 15 each--NP  +Standing QL stretch (L) (corner wall) 5 x 10 sec  EIS x 10       Peripheral muscle  strengthening which included 1 set of 15-20 repetitions at a slow, controlled 10-13 second per rep pace focused on strengthening supporting musculature for improved body mechanics and functional mobility.  Pt and therapist focused on proper form during treatment to ensure optimal strengthening of each targeted muscle group.  Machines were utilized including torso rotation, leg press, hip abd and hip add, leg ext.  Leg curl, triceps, biceps, chest and row added visit 3    manual therapy techniques: FDN L quadratus in R SL with pillow under R hip to increase RSB, 3 needles, 2 inch, followed by MHP x 5 min-  .   cold pack for 0 minutes to LB.    Home Exercises Provided and Patient Education Provided   Home exercises include: Standing quadratus stretch,LTR,EIS,Open book  Cardio program: visit 5 6/16/23 benefits of cardio handout provided.  Lifting education date:visit 11, 7/7/23 do's and don'ts of lifting handout provided  Posture/Lumbar roll: yes, pt states he feels it is more uncomfortable  Fridge Magnet Discharge handout (date given):  Equipment at home/gym membership: yes    Education provided:   - Cues w/ex's    Written Home Exercises Provided: Patient instructed to cont prior HEP.  Exercises were reviewed and AZUCENA was able to demonstrate them prior to the end of the session.  AZUCENA demonstrated good  understanding of the education provided.     See EMR under Patient Instructions for exercises provided prior visit.    Assessment   AZUCENA returns with mild (L) lower back/QL stiffness and discomfort. He reports that he continues to respond well to FDN treatments with reduction in stiffness, but relief doesn't last. Treatment continued with lumbopelvic/core neuromuscular re-education, flexibility and strengthening ex's.  Added reps for bridging with resistance. Lumbar MedX resistance was maintained at 98 ft/lbs completing 20 reps with a RPE = 6/10. He was able to complete the full circuit of peripheral strengthening ex's  within appropriate muscular fatigue without increased pain. Will look to progress per HB protocol and patient tolerance. .  Patient is making good progress towards established goals.  Pt will continue to benefit from skilled outpatient physical therapy to address the deficits stated in the impairment chart, provide pt/family education and to maximize pt's level of independence in the home and community environment.     Anticipated Barriers for therapy: none  Pt's spiritual, cultural and educational needs considered and pt agreeable to plan of care and goals as stated below:    GOALS: Pt is in agreement with the following goals.     Short term goals:  6 weeks or 10 visits   1.  Pt will demonstrate increased lumbar ROM by at least 3 degrees from the initial ROM value with improvements noted in functional ROM and ability to perform ADLs.  MET  2.  Pt will demonstrate increased MedX average isometric strength value  by 20% from initial test resulting in improved ability to perform bending, lifting, and carrying activities safely, confidently.  (approp and ongoing)  3.  Patient report a reduction in worst pain score by 1-2 points for improved tolerance for sitting.  (approp and ongoing)  4.  Pt able to perform HEP correctly with minimal cueing or supervision from therapist to encourage independent management of symptoms. (approp and ongoing)      Long term goals: 10 weeks or 20 visits   1. Pt will demonstrate increased lumbar ROM by at least 6 degrees from initial ROM value, resulting in improved ability to perform functional fwd bending while standing and sitting.MET  2. Pt will demonstrate increased MedX average isometric strength value  by 40% from initial test resulting in improved ability to perform bending, lifting, and carrying activities safely, confidently.  (approp and ongoing)  3. Pt to demonstrate ability to independently control and reduce their pain through posture positioning and mechanical movements  throughout a typical day.  (approp and ongoing)  4.  Pt will demonstrate reduced pain and improved functional outcomes as reported on the FOTO by reaching a limitation score of < or = 30% or less in order to demonstrate subjective improvement in pt's condition.    (approp and ongoing)  5. Pt will demonstrate independence with the HEP at discharge  (approp and ongoing)  6.  Pt(patient goal)will report no pain or stiffness with driving > 1 hour  (approp and ongoing)     Plan   Continue with established Plan of Care towards established PT goals.     Therapist: Mercedes Conteh, TRUPTI  7/19/2023

## 2023-07-21 ENCOUNTER — CLINICAL SUPPORT (OUTPATIENT)
Dept: REHABILITATION | Facility: HOSPITAL | Age: 48
End: 2023-07-21
Payer: COMMERCIAL

## 2023-07-21 DIAGNOSIS — R29.898 DECREASED STRENGTH OF TRUNK AND BACK: ICD-10-CM

## 2023-07-21 DIAGNOSIS — R29.3 POOR POSTURE: Primary | ICD-10-CM

## 2023-07-21 PROCEDURE — 97750 PHYSICAL PERFORMANCE TEST: CPT | Mod: 32

## 2023-07-21 NOTE — PROGRESS NOTES
"[facility]      Name: Darek Mcnair  [facility] Number: [id number]    Therapy Diagnosis:   Encounter Diagnoses   Name Primary?    Poor posture Yes    Decreased strength of trunk and back        Physician: Chata Kilgore, *    Visit Date: 2023  Physician Orders: PT Eval and Treat   Medical Diagnosis from Referral: M54.50,G89.29 (ICD-10-CM) - Chronic left-sided low back pain without sciatica  Evaluation Date: 2023  Authorization Period Expiration: 23  Plan of Care Expiration: 23  Reassessment Due:23  Visit # / Visits authorized:15/20     Time In: 1:30 PM  Time Out:  2:40 PM   Total Billable Time: 60 minutes  Insurance:[employer] Benefit Patient     Precautions: Standard     Pattern of pain determined: 1PEN     Subjective   Darek reports that he continues to benefit from dry needling treatments but relief is only temporary. (L)Lower back/QL discomfort is only mild.  He does note improvements in strength since beginning of the HB program.     Patient reports tolerating previous visit : Minimal muscular soreness  Patient reports their pain to be 2/10 on a 0-10 scale with 0 being no pain and 10 being the worst pain imaginable.  Pain Location: LLB/quadratus  Social History:  lives with their spouse  Occupation: Works in [employer], sitting and standing  Leisure: fish/running /golf   Pts goals: "Get rid of pain"    Objective     Baseline Isometric Testing on Med X equipment: Testing administered by PT  Date of testin23  ROM 6-54 deg   Max Peak Torque 226    Min Peak Torque 119    Flex/Ext Ratio 1.8:1   % below normative data 16       Mid point Isometric Testing on Med X equipment: Testing administered by PT  Date of testin23  ROM 0-54 deg   Max Peak Torque 237   Min Peak Torque 161   Flex/Ext Ratio 1.4:1   % below normative data 6       5%strength increase    Limitation/Restriction for FOTO Lumbar Survey     Therapist reviewed FOTO scores " for Darek Mcnair on 4/5/2023.   FOTO documents entered into [employer] - see Media section.     Limitation Score: 33%   REGINALDO 12% visit 10      Treatment    AZUCENA received the treatments listed below:      AZUCENA received neuromuscular education  to isolate and engage spinal stabilization musculature correctly for motor control and coordination to aid in function and posture for 10 minutes on the Medical Medx Machine.  Patient performed MedX dynamic exercise with emphasis on spinal muscular control using pacer throughout  active range of motion. Therapist assisted patient in achieving optimal exertion for neural reeducation and endurance training by using the  Cyndi Exertion Rating scale, by instructing the patient to aim for mid range of exertion, performing 15-20 repetitions, slowly, correctly,and safely.     [facility] - Short 7/21/2023   Visit Number 15   VAS Pain Rating 2   Time 5   Lumbar Stretches - Slouch -   Extension in Lying 10   Extension in Standing 10   Manual Therapy -   Femur Restraint -   Top Dead Center -   Counterweight -   Lumbar Flexion -   Lumbar Extension -   Lumbar Peak Torque -   Lumbar Weight 103   Repetitions 15   Rating of Perceived Exertion 6      therapeutic exercises to develop strength, endurance, ROM, flexibility, posture, and core stabilization for 50 minutes including:    LTR x 10 (arms overhead)  Open book  x 5 for mobility and 5 with GTB for strengthening  Supine rotational QL stretch 5 x 10 sec  HSS w/strap 2 x 30 sec --NP  Dying bug w/T-ball 2 x 5  Bridging w/Black Tband (AROM ER at top) x 20x  Bridging c/ BTB with sports cord for UE pull down x 10--NP  +Bridging with LE's extended on T-ball x 10  EIL x 10  Cat/cow x 10--NP  Bird dog +Sports cord resistance x 10  Lateral step outs to Paloff press on Inspire cable cross 20# x 10 reps   Cable cross lifts in 1/2 kneeling 20# x 10 each   Cable cross chop downs 20# x 15 each  Standing QL stretch (L) (corner wall) 5 x 10 sec--NP  EIS  x 10     Peripheral muscle strengthening which included 1 set of 15-20 repetitions at a slow, controlled 10-13 second per rep pace focused on strengthening supporting musculature for improved body mechanics and functional mobility.  Pt and therapist focused on proper form during treatment to ensure optimal strengthening of each targeted muscle group.  Machines were utilized including torso rotation, leg press, hip abd and hip add, leg ext.  Leg curl, triceps, biceps, chest and row added visit 3    manual therapy techniques: FDN L quadratus in R SL with pillow under R hip to increase RSB, 3 needles, 2 inch, followed by MHP x 5 min- NP  .   cold pack for 0 minutes to LB.    Home Exercises Provided and Patient Education Provided   Home exercises include: Standing quadratus stretch,LTR,EIS,Open book, EIL, EIS  Cardio program: visit 5 6/16/23 benefits of cardio handout provided.  Lifting education date:visit 11, 7/7/23 do's and don'ts of lifting handout provided  Posture/Lumbar roll: yes, pt states he feels it is more uncomfortable  Fridge Magn[employer] Discharge handout (date given):  Equipment at home/gym membership: yes    Education provided:   - Cues w/ex's    Written Home Exercises Provided: Patient instructed to cont prior HEP.  Exercises were reviewed and AZUCENA was able to demonstrate them prior to the end of the session.  AZUCENA demonstrated good  understanding of the education provided.     See EMR under Patient Instructions for exercises provided prior visit.    Assessment    AZUCENA returns with mild (L) lower back/QL stiffness and discomfort. He reports that he continues to respond well to FDN treatments with reduction in stiffness but relief has been temporary. Treatment continued with lumbopelvic/core neuromuscular re-education, flexibility and strengthening ex's. Added bridging with T-ball which provided a good challenge. Lumbar MedX resistance was increased to 103 ft/lbs completing 15 reps with a RPE = 6/10. He was able to  complete the full circuit of peripheral strengthening ex's within appropriate muscular fatigue without increased pain. Will look to progress per HB protocol and patient tolerance. Will look to resume dry needling treatment next week.     Patient is making good progress towards established goals.  Pt will continue to benefit from skilled outpatient physical therapy to address the deficits stated in the impairment chart, provide pt/family education and to maximize pt's level of independence in the home and community environment.     Anticipated Barriers for therapy: none  Pt's spiritual, cultural and educational needs considered and pt agreeable to plan of care and goals as stated below:    GOALS: Pt is in agreement with the following goals.     Short term goals:  6 weeks or 10 visits   1.  Pt will demonstrate increased lumbar ROM by at least 3 degrees from the initial ROM value with improvements noted in functional ROM and ability to perform ADLs.  MET  2.  Pt will demonstrate increased MedX average isometric strength value  by 20% from initial test resulting in improved ability to perform bending, lifting, and carrying activities safely, confidently.  (approp and ongoing)  3.  Patient report a reduction in worst pain score by 1-2 points for improved tolerance for sitting.  (approp and ongoing)  4.  Pt able to perform HEP correctly with minimal cueing or supervision from therapist to encourage independent management of symptoms. (approp and ongoing)      Long term goals: 10 weeks or 20 visits   1. Pt will demonstrate increased lumbar ROM by at least 6 degrees from initial ROM value, resulting in improved ability to perform functional fwd bending while standing and sitting.MET  2. Pt will demonstrate increased MedX average isometric strength value  by 40% from initial test resulting in improved ability to perform bending, lifting, and carrying activities safely, confidently.  (approp and ongoing)  3. Pt to demonstrate  ability to independently control and reduce their pain through posture positioning and mechanical movements throughout a typical day.  (approp and ongoing)  4.  Pt will demonstrate reduced pain and improved functional outcomes as reported on the FOTO by reaching a limitation score of < or = 30% or less in order to demonstrate subjective improvement in pt's condition.    (approp and ongoing)  5. Pt will demonstrate independence with the HEP at discharge  (approp and ongoing)  6.  Pt(patient goal)will report no pain or stiffness with driving > 1 hour  (approp and ongoing)     Plan   Continue with established Plan of Care towards established PT goals.     Therapist: Darek Montes, PTA  7/21/2023

## 2023-07-26 ENCOUNTER — CLINICAL SUPPORT (OUTPATIENT)
Dept: REHABILITATION | Facility: HOSPITAL | Age: 48
End: 2023-07-26
Payer: COMMERCIAL

## 2023-07-26 DIAGNOSIS — R29.3 POOR POSTURE: Primary | ICD-10-CM

## 2023-07-26 DIAGNOSIS — R29.898 DECREASED STRENGTH OF TRUNK AND BACK: ICD-10-CM

## 2023-07-26 PROCEDURE — 97750 PHYSICAL PERFORMANCE TEST: CPT | Mod: 32

## 2023-07-26 NOTE — PROGRESS NOTES
"[facility]      Name: Darek Mcnair  Clinic Number: [id number]    Therapy Diagnosis:   Encounter Diagnoses   Name Primary?    Poor posture Yes    Decreased strength of trunk and back        Physician: Chata Kilgore, *    Visit Date: 2023  Physician Orders: PT Eval and Treat   Medical Diagnosis from Referral: M54.50,G89.29 (ICD-10-CM) - Chronic left-sided low back pain without sciatica  Evaluation Date: 2023  Authorization Period Expiration: 23  Plan of Care Expiration: 23  Reassessment Due:23  Visit # / Visits authorized:     Time In:650  Time Out: 750  Total Billable Time: 60 minutes  Insurance:[employer] Benefit Patient     Precautions: Standard     Pattern of pain determined: 1PEN     Subjective   Darek reports that he is continuing to feel stronger, but still having some pain     Patient reports tolerating previous visit : Minimal muscular soreness  Patient reports their pain to be 2/10 on a 0-10 scale with 0 being no pain and 10 being the worst pain imaginable.  Pain Location: LLB/quadratus  Social History:  lives with their spouse  Occupation: Works in [employer] tech, sitting and standing  Leisure: fish/running /golf   Pts goals: "Get rid of pain"    Objective     Baseline Isometric Testing on Med X equipment: Testing administered by PT  Date of testin23  ROM 6-54 deg   Max Peak Torque 226    Min Peak Torque 119    Flex/Ext Ratio 1.8:1   % below normative data 16       Mid point Isometric Testing on Med X equipment: Testing administered by PT  Date of testin23  ROM 0-54 deg   Max Peak Torque 237   Min Peak Torque 161   Flex/Ext Ratio 1.4:1   % below normative data 6       5%strength increase    Limitation/Restriction for FOTO Lumbar Survey     Therapist reviewed FOTO scores for Darek Mcnair on 2023.   FOTO documents entered into [employer] - see Media section.     Limitation Score: 33%   REGINALDO 12% visit 10    "   Treatment    AZUCENA received the treatments listed below:      AZUCENA received neuromuscular education  to isolate and engage spinal stabilization musculature correctly for motor control and coordination to aid in function and posture for 10 minutes on the Medical Medx Machine.  Patient performed MedX dynamic exercise with emphasis on spinal muscular control using pacer throughout  active range of motion. Therapist assisted patient in achieving optimal exertion for neural reeducation and endurance training by using the  Cyndi Exertion Rating scale, by instructing the patient to aim for mid range of exertion, performing 15-20 repetitions, slowly, correctly,and safely.   [facility] 7/26/2023   Visit Number 16   VAS Pain Rating 2   Time 5   Lumbar Stretches - Slouch Overcorrection -   Extension in Lying 10   Extension in Standing 10   Manual Therapy -   Femur Restraint -   Top Dead Center -   Counterweight -   Lumbar Flexion -   Lumbar Extension -   Lumbar Peak Torque -   Min Torque -   Test Percent Below Normative Data -   Test Percent Gain in Strength from Initial  -   Lumbar Weight 103   Repetitions 17   Rating of Perceived Exertion 6   Ice - Z Lie (in min.) 5         therapeutic exercises to develop strength, endurance, ROM, flexibility, posture, and core stabilization for 50 minutes including:    LTR x 10 (arms overhead)  Open book  x 5 for mobility and 5 with GTB for strengthening  Supine rotational QL stretch 5 x 10 sec  HSS w/strap 2 x 30 sec --NP  Dying bug w/T-ball 2 x 5  Bridging w/Black Tband (AROM ER at top) x 20x  Bridging c/ BTB with sports cord for UE pull down x 15x  +Bridging with LE's extended on T-ball x 10  EIL x 10  Cat/cow x 10--NP  Bird dog +Sports cord resistance x 10  Lateral step outs to Paloff press on Inspire cable cross 20# x 10 reps   Cable cross lifts in 1/2 kneeling 20# x 10 each   Cable cross chop downs 20# x 15 each  Standing QL stretch (L) (corner wall) 5 x 10 sec--NP  EIS x 10      Peripheral muscle strengthening which included 1 set of 15-20 repetitions at a slow, controlled 10-13 second per rep pace focused on strengthening supporting musculature for improved body mechanics and functional mobility.  Pt and therapist focused on proper form during treatment to ensure optimal strengthening of each targeted muscle group.  Machines were utilized including torso rotation, leg press, hip abd and hip add, leg ext.  Leg curl, triceps, biceps, chest and row added visit 3    manual therapy techniques: FDN L quadratus in R SL with pillow under R hip to increase RSB, 3 needles, 2 inch, followed by MHP x 5 min- NP  .   cold pack for 0 minutes to LB.    Home Exercises Provided and Patient Education Provided   Home exercises include: Standing quadratus stretch,LTR,EIS,Open book, EIL, EIS  Cardio program: visit 5 6/16/23 benefits of cardio handout provided.  Lifting education date:visit 11, 7/7/23 do's and don'ts of lifting handout provided  Posture/Lumbar roll: yes, pt states he feels it is more uncomfortable  [employer] Discharge handout (date given):  Equipment at home/gym membership: yes    Education provided:   - Cues w/ex's    Written Home Exercises Provided: Patient instructed to cont prior HEP.  Exercises were reviewed and JJ was able to demonstrate them prior to the end of the session.  JJ demonstrated good  understanding of the education provided.     See EMR under Patient Instructions for exercises provided prior visit.    Assessment    JJ returns with mild (L) lower back/QL stiffness and discomfort.  Treatment continued with lumbopelvic/core neuromuscular re-education, flexibility and strengthening ex's. Increased reps for bridging with sport cord .  Lumbar MedX resistance was maintained at 103 ft/lbs completing 17 reps with a RPE = 6/10. He was able to complete the full circuit of peripheral strengthening ex's within appropriate muscular fatigue without increased pain. Will look to  progress per HB protocol and patient tolerance.    Patient is making good progress towards established goals.  Pt will continue to benefit from skilled outpatient physical therapy to address the deficits stated in the impairment chart, provide pt/family education and to maximize pt's level of independence in the home and community environment.     Anticipated Barriers for therapy: none  Pt's spiritual, cultural and educational needs considered and pt agreeable to plan of care and goals as stated below:    GOALS: Pt is in agreement with the following goals.     Short term goals:  6 weeks or 10 visits   1.  Pt will demonstrate increased lumbar ROM by at least 3 degrees from the initial ROM value with improvements noted in functional ROM and ability to perform ADLs.  MET  2.  Pt will demonstrate increased MedX average isometric strength value  by 20% from initial test resulting in improved ability to perform bending, lifting, and carrying activities safely, confidently.  (approp and ongoing)  3.  Patient report a reduction in worst pain score by 1-2 points for improved tolerance for sitting.  (approp and ongoing)  4.  Pt able to perform HEP correctly with minimal cueing or supervision from therapist to encourage independent management of symptoms. (approp and ongoing)      Long term goals: 10 weeks or 20 visits   1. Pt will demonstrate increased lumbar ROM by at least 6 degrees from initial ROM value, resulting in improved ability to perform functional fwd bending while standing and sitting.MET  2. Pt will demonstrate increased MedX average isometric strength value  by 40% from initial test resulting in improved ability to perform bending, lifting, and carrying activities safely, confidently.  (approp and ongoing)  3. Pt to demonstrate ability to independently control and reduce their pain through posture positioning and mechanical movements throughout a typical day.  (approp and ongoing)  4.  Pt will demonstrate  reduced pain and improved functional outcomes as reported on the FOTO by reaching a limitation score of < or = 30% or less in order to demonstrate subjective improvement in pt's condition.    (approp and ongoing)  5. Pt will demonstrate independence with the HEP at discharge  (approp and ongoing)  6.  Pt(patient goal)will report no pain or stiffness with driving > 1 hour  (approp and ongoing)     Plan   Continue with established Plan of Care towards established PT goals.     Therapist: Mercedes Conteh, PT  7/26/2023

## 2023-07-28 ENCOUNTER — CLINICAL SUPPORT (OUTPATIENT)
Dept: REHABILITATION | Facility: HOSPITAL | Age: 48
End: 2023-07-28
Payer: COMMERCIAL

## 2023-07-28 DIAGNOSIS — R29.898 DECREASED STRENGTH OF TRUNK AND BACK: ICD-10-CM

## 2023-07-28 DIAGNOSIS — R29.3 POOR POSTURE: Primary | ICD-10-CM

## 2023-07-28 PROCEDURE — 97750 PHYSICAL PERFORMANCE TEST: CPT | Mod: 32

## 2023-07-28 NOTE — PROGRESS NOTES
"[facility]      Name: Darek Mcnair  Clinic Number: [id number]    Therapy Diagnosis:   Encounter Diagnoses   Name Primary?    Poor posture Yes    Decreased strength of trunk and back        Physician: Chata Kilgore, *    Visit Date: 2023  Physician Orders: PT Eval and Treat   Medical Diagnosis from Referral: M54.50,G89.29 (ICD-10-CM) - Chronic left-sided low back pain without sciatica  Evaluation Date: 2023  Authorization Period Expiration: 23  Plan of Care Expiration: 23  Reassessment Due:23  Visit # / Visits authorized:     Time In: 1:25 PM  Time Out: 2:25 PM  Total Billable Time: 55 minutes  Insurance:Healthy Back Benefit Patient     Precautions: Standard     Pattern of pain determined: 1PEN     Subjective   Darek reports mild (L)Lower back/QL discomfort presently.    Patient reports tolerating previous visit : Minimal muscular soreness  Patient reports their pain to be 2/10 on a 0-10 scale with 0 being no pain and 10 being the worst pain imaginable.  Pain Location: LLB/quadratus  Social History:  lives with their spouse  Occupation: Works in [employer], sitting and standing  Leisure: fish/running /golf   Pts goals: "Get rid of pain"    Objective     Baseline Isometric Testing on Med X equipment: Testing administered by PT  Date of testin23  ROM 6-54 deg   Max Peak Torque 226    Min Peak Torque 119    Flex/Ext Ratio 1.8:1   % below normative data 16       Mid point Isometric Testing on Med X equipment: Testing administered by PT  Date of testin23  ROM 0-54 deg   Max Peak Torque 237   Min Peak Torque 161   Flex/Ext Ratio 1.4:1   % below normative data 6       5%strength increase    Limitation/Restriction for FOTO Lumbar Survey     Therapist reviewed FOTO scores for Darek Mcnair on 2023.   FOTO documents entered into [employer] - see Media section.     Limitation Score: 33%   REGINALDO 12% visit 10      Treatment    AZUCENA " received the treatments listed below:      AZUCENA received neuromuscular education  to isolate and engage spinal stabilization musculature correctly for motor control and coordination to aid in function and posture for 10 minutes on the Medical Medx Machine.  Patient performed MedX dynamic exercise with emphasis on spinal muscular control using pacer throughout  active range of motion. Therapist assisted patient in achieving optimal exertion for neural reeducation and endurance training by using the  Cyndi Exertion Rating scale, by instructing the patient to aim for mid range of exertion, performing 15-20 repetitions, slowly, correctly,and safely.     [facility] - Short 7/28/2023   Visit Number 17   VAS Pain Rating 2   Time 5   Lumbar Stretches - Slouch -   Extension in Lying 10   Extension in Standing 10   Manual Therapy -   Femur Restraint -   Top Dead Center -   Counterweight -   Lumbar Flexion -   Lumbar Extension -   Lumbar Peak Torque -   Lumbar Weight 103   Repetitions 18   Rating of Perceived Exertion 6.5      therapeutic exercises to develop strength, endurance, ROM, flexibility, posture, and core stabilization for 45 minutes including:    LTR x 10 (arms overhead)  Open book  x 5 for mobility and 5 with GTB for strengthening  Supine rotational QL stretch 5 x 10 sec  HSS w/strap 2 x 30 sec --NP  Dying bug w/T-ball 2 x 5  Bridging w/Black Tband (AROM ER at top) x 20x  Bridging c/ BTB with sports cord for UE pull down x 15x--NP  Bridging with LE's extended on T-ball x 10--NP  +Prone plank hold 2 x 20sec  +Lateral plank hold (on knees) + clamshell x 10  EIL x 10  Cat/cow x 10--NP  Bird dog +Sports cord resistance x 10  Lateral step outs to Paloff press on Inspire cable cross 20# x 10 reps   Cable cross lifts in 1/2 kneeling 20# x 10 each --NP  Cable cross chop downs 20# x 15 each--NP  Standing QL stretch (L) (corner wall) 5 x 10 sec--NP  EIS x 10     Peripheral muscle strengthening which included 1 set of  15-20 repetitions at a slow, controlled 10-13 second per rep pace focused on strengthening supporting musculature for improved body mechanics and functional mobility.  Pt and therapist focused on proper form during treatment to ensure optimal strengthening of each targeted muscle group.  Machines were utilized including torso rotation, leg press, hip abd and hip add, leg ext.  Leg curl, triceps, biceps, chest and row added visit 3    manual therapy techniques: FDN L quadratus in R SL with pillow under R hip to increase RSB, 3 needles, 2 inch, followed by MHP x 5 min- (Performed by Mercedes Conteh PT prior to Treatment)  .   cold pack for 0 minutes to LB. (None due to dry needling treatment).    Home Exercises Provided and Patient Education Provided   Home exercises include: Standing quadratus stretch,LTR,EIS,Open book, EIL, EIS  Cardio program: visit 5 6/16/23 benefits of cardio handout provided.  Lifting education date:visit 11, 7/7/23 do's and don'ts of lifting handout provided  Posture/Lumbar roll: yes, pt states he feels it is more uncomfortable  Fridge Magnet Discharge handout (date given):  Equipment at home/gym membership: yes    Education provided:   - Cues w/ex's    Written Home Exercises Provided: Patient instructed to cont prior HEP.  Exercises were reviewed and EDIEJ was able to demonstrate them prior to the end of the session.  JJ demonstrated good  understanding of the education provided.     See EMR under Patient Instructions for exercises provided prior visit.    Assessment   JJ returns with mild (L) lower back/QL stiffness and discomfort. Treatment continued with lumbopelvic/core neuromuscular re-education, flexibility and strengthening ex's. Patient receives dry needling treatment from Mercedes Conteh PT prior to ex's. Added prone planks and lateral plank w/ER this visit which he was able to perform without c/o. Lumbar MedX resistance was maintained at 103 ft/lbs completing 18 reps with a RPE = 6.5/10.  He was able to complete the full circuit of peripheral strengthening ex's within appropriate muscular fatigue without increased pain. Will look to progress per HB protocol and patient tolerance.     Patient is making progress towards established goals.  Pt will continue to benefit from skilled outpatient physical therapy to address the deficits stated in the impairment chart, provide pt/family education and to maximize pt's level of independence in the home and community environment.     Anticipated Barriers for therapy: none  Pt's spiritual, cultural and educational needs considered and pt agreeable to plan of care and goals as stated below:    GOALS: Pt is in agreement with the following goals.     Short term goals:  6 weeks or 10 visits   1.  Pt will demonstrate increased lumbar ROM by at least 3 degrees from the initial ROM value with improvements noted in functional ROM and ability to perform ADLs.  MET  2.  Pt will demonstrate increased MedX average isometric strength value  by 20% from initial test resulting in improved ability to perform bending, lifting, and carrying activities safely, confidently.  (approp and ongoing)  3.  Patient report a reduction in worst pain score by 1-2 points for improved tolerance for sitting.  (approp and ongoing)  4.  Pt able to perform HEP correctly with minimal cueing or supervision from therapist to encourage independent management of symptoms. (approp and ongoing)      Long term goals: 10 weeks or 20 visits   1. Pt will demonstrate increased lumbar ROM by at least 6 degrees from initial ROM value, resulting in improved ability to perform functional fwd bending while standing and sitting.MET  2. Pt will demonstrate increased MedX average isometric strength value  by 40% from initial test resulting in improved ability to perform bending, lifting, and carrying activities safely, confidently.  (approp and ongoing)  3. Pt to demonstrate ability to independently control and reduce  Satisfactory their pain through posture positioning and mechanical movements throughout a typical day.  (approp and ongoing)  4.  Pt will demonstrate reduced pain and improved functional outcomes as reported on the FOTO by reaching a limitation score of < or = 30% or less in order to demonstrate subjective improvement in pt's condition.    (approp and ongoing)  5. Pt will demonstrate independence with the HEP at discharge  (approp and ongoing)  6.  Pt(patient goal)will report no pain or stiffness with driving > 1 hour  (approp and ongoing)     Plan   Continue with established Plan of Care towards established PT goals.     Therapist: Darek Montes, PTA  7/28/2023

## 2023-08-02 ENCOUNTER — CLINICAL SUPPORT (OUTPATIENT)
Dept: REHABILITATION | Facility: HOSPITAL | Age: 48
End: 2023-08-02
Payer: COMMERCIAL

## 2023-08-02 DIAGNOSIS — R29.3 POOR POSTURE: Primary | ICD-10-CM

## 2023-08-02 DIAGNOSIS — R29.898 DECREASED STRENGTH OF TRUNK AND BACK: ICD-10-CM

## 2023-08-02 PROCEDURE — 97750 PHYSICAL PERFORMANCE TEST: CPT | Mod: 32

## 2023-08-02 NOTE — PROGRESS NOTES
"[facility]      Name: Darek Mcnair  Clinic Number: [id number]    Therapy Diagnosis:   Encounter Diagnoses   Name Primary?    Poor posture Yes    Decreased strength of trunk and back        Physician: Chata Kilgore, *    Visit Date: 2023  Physician Orders: PT Eval and Treat   Medical Diagnosis from Referral: M54.50,G89.29 (ICD-10-CM) - Chronic left-sided low back pain without sciatica  Evaluation Date: 2023  Authorization Period Expiration: 23  Plan of Care Expiration: 23  Reassessment Due:23  Visit # / Visits authorized:     Time In: 650  Time Out: 750  Total Billable Time: 55 minutes  Insurance:Healthy [employer] Benefit Patient     Precautions: Standard     Pattern of pain determined: 1PEN     Subjective   Darek reports mild (L)Lower back/QL discomfort presently.  Reports 2 days of relief with FDN last session    Patient reports tolerating previous visit : Minimal muscular soreness  Patient reports their pain to be 3/10 on a 0-10 scale with 0 being no pain and 10 being the worst pain imaginable.  Pain Location: LLB/quadratus  Social History:  lives with their spouse  Occupation: Works in [employer], sitting and standing  Leisure: fish/running /golf   Pts goals: "Get rid of pain"    Objective     Baseline Isometric Testing on Med X equipment: Testing administered by PT  Date of testin23  ROM 6-54 deg   Max Peak Torque 226    Min Peak Torque 119    Flex/Ext Ratio 1.8:1   % below normative data 16       Mid point Isometric Testing on Med X equipment: Testing administered by PT  Date of testin23  ROM 0-54 deg   Max Peak Torque 237   Min Peak Torque 161   Flex/Ext Ratio 1.4:1   % below normative data 6       5%strength increase    Limitation/Restriction for FOTO Lumbar Survey     Therapist reviewed FOTO scores for Darek Mcnair on 2023.   FOTO documents entered into [employer] - see Media section.     Limitation Score: 33%   REGINALDO " 12% visit 10      Treatment    AZUCENA received the treatments listed below:      AZUCENA received neuromuscular education  to isolate and engage spinal stabilization musculature correctly for motor control and coordination to aid in function and posture for 10 minutes on the Medical Medx Machine.  Patient performed MedX dynamic exercise with emphasis on spinal muscular control using pacer throughout  active range of motion. Therapist assisted patient in achieving optimal exertion for neural reeducation and endurance training by using the  Cyndi Exertion Rating scale, by instructing the patient to aim for mid range of exertion, performing 15-20 repetitions, slowly, correctly,and safely.   [facility] 8/2/2023   Visit Number 18   VAS Pain Rating 3   Time 5   Lumbar Stretches - Slouch Overcorrection -   Extension in Lying 10   Extension in Standing 10   Manual Therapy 5   Femur Restraint -   Top Dead Center -   Counterweight -   Lumbar Flexion -   Lumbar Extension -   Lumbar Peak Torque -   Min Torque -   Test Percent Below Normative Data -   Test Percent Gain in Strength from Initial  -   Lumbar Weight 103   Repetitions 19   Rating of Perceived Exertion 7   Ice - Z Lie (in min.) 5       therapeutic exercises to develop strength, endurance, ROM, flexibility, posture, and core stabilization for 45 minutes including:    LTR x 10 (arms overhead)  Open book  x 5 for mobility and 5 with GTB for strengthening  Supine rotational QL stretch 5 x 10 sec  HSS w/strap 2 x 30 sec --NP  Dying bug w/T-ball 2 x 5  Bridging w/ Maharaj Tband (AROM ER at top) x 20x  +Prone plank hold 2 x 30secsec  +Lateral plank hold (on knees) + clamshell x 10  EIL x 10  Bird dog +Sports cord resistance x 10  3 second holds  Lateral step outs to Paloff press on Inspire cable cross 25# x 10 reps   EIS x 10     N/P  Cable cross lifts in 1/2 kneeling 20# x 10 each --NP  Cable cross chop downs 20# x 15 each--NP  Standing QL stretch (L) (corner wall) 5 x 10  sec--NP  Bridging c/ BTB with sports cord for UE pull down x 15x--NP  Bridging with LE's extended on T-ball x 10--NP  Cat/cow x 10--NP  Peripheral muscle strengthening which included 1 set of 15-20 repetitions at a slow, controlled 10-13 second per rep pace focused on strengthening supporting musculature for improved body mechanics and functional mobility.  Pt and therapist focused on proper form during treatment to ensure optimal strengthening of each targeted muscle group.  Machines were utilized including torso rotation, leg press, hip abd and hip add, leg ext.  Leg curl, triceps, biceps, chest and row added visit 3    manual therapy techniques: FDN L quadratus in R SL with pillow under R hip to increase RSB, 3 needles, 2 inch, followed by MHP x 5 min-   .   cold pack for 0 minutes to LB. (None due to dry needling treatment).    Home Exercises Provided and Patient Education Provided   Home exercises include: Standing quadratus stretch,LTR,EIS,Open book, EIL, EIS  Cardio program: visit 5 6/16/23 benefits of cardio handout provided.  Lifting education date:visit 11, 7/7/23 do's and don'ts of lifting handout provided  Posture/Lumbar roll: yes, pt states he feels it is more uncomfortable  Fridge Magnet Discharge handout (date given):  Equipment at home/gym membership: yes    Education provided:   - Cues w/ex's    Written Home Exercises Provided: Patient instructed to cont prior HEP.  Exercises were reviewed and JJ was able to demonstrate them prior to the end of the session.  JJ demonstrated good  understanding of the education provided.     See EMR under Patient Instructions for exercises provided prior visit.    Assessment   JJ returns with mild (L) lower back/QL stiffness and discomfort. Pt reports relief for 2 days following FDN, performed again today at end of session without adverse effects. Treatment continued with lumbopelvic/core neuromuscular exercises.  Increased intensity for Bridging with band / planks  and Paloff press. Pt tolerated increased intensity well without complaints.  Continued with Med X weight of 103ft/lbs with pt completing 19 reps at 7/10 exertion level.  Patient is making progress towards established goals.  Pt will continue to benefit from skilled outpatient physical therapy to address the deficits stated in the impairment chart, provide pt/family education and to maximize pt's level of independence in the home and community environment.     Anticipated Barriers for therapy: none  Pt's spiritual, cultural and educational needs considered and pt agreeable to plan of care and goals as stated below:    GOALS: Pt is in agreement with the following goals.     Short term goals:  6 weeks or 10 visits   1.  Pt will demonstrate increased lumbar ROM by at least 3 degrees from the initial ROM value with improvements noted in functional ROM and ability to perform ADLs.  MET  2.  Pt will demonstrate increased MedX average isometric strength value  by 20% from initial test resulting in improved ability to perform bending, lifting, and carrying activities safely, confidently.  (approp and ongoing)  3.  Patient report a reduction in worst pain score by 1-2 points for improved tolerance for sitting.  (approp and ongoing)  4.  Pt able to perform HEP correctly with minimal cueing or supervision from therapist to encourage independent management of symptoms. (approp and ongoing)      Long term goals: 10 weeks or 20 visits   1. Pt will demonstrate increased lumbar ROM by at least 6 degrees from initial ROM value, resulting in improved ability to perform functional fwd bending while standing and sitting.MET  2. Pt will demonstrate increased MedX average isometric strength value  by 40% from initial test resulting in improved ability to perform bending, lifting, and carrying activities safely, confidently.  (approp and ongoing)  3. Pt to demonstrate ability to independently control and reduce their pain through posture  positioning and mechanical movements throughout a typical day.  (approp and ongoing)  4.  Pt will demonstrate reduced pain and improved functional outcomes as reported on the FOTO by reaching a limitation score of < or = 30% or less in order to demonstrate subjective improvement in pt's condition.    (approp and ongoing)  5. Pt will demonstrate independence with the HEP at discharge  (approp and ongoing)  6.  Pt(patient goal)will report no pain or stiffness with driving > 1 hour  (approp and ongoing)     Plan   Continue with established Plan of Care towards established PT goals.     Therapist: Mercedes Conteh, PT  8/2/2023

## 2023-08-04 ENCOUNTER — DOCUMENTATION ONLY (OUTPATIENT)
Dept: REHABILITATION | Facility: HOSPITAL | Age: 48
End: 2023-08-04
Payer: COMMERCIAL

## 2023-08-04 ENCOUNTER — CLINICAL SUPPORT (OUTPATIENT)
Dept: REHABILITATION | Facility: HOSPITAL | Age: 48
End: 2023-08-04
Payer: COMMERCIAL

## 2023-08-04 DIAGNOSIS — R29.898 DECREASED STRENGTH OF TRUNK AND BACK: ICD-10-CM

## 2023-08-04 DIAGNOSIS — R29.3 POOR POSTURE: Primary | ICD-10-CM

## 2023-08-04 PROCEDURE — 97750 PHYSICAL PERFORMANCE TEST: CPT | Mod: 32

## 2023-08-04 NOTE — PROGRESS NOTES
"[facility]      Name: Darek Mcnair  Clinic Number: [id number]    Therapy Diagnosis:   Encounter Diagnoses   Name Primary?    Poor posture Yes    Decreased strength of trunk and back        Physician: Chata Kilgore, *    Visit Date: 2023  Physician Orders: PT Eval and Treat   Medical Diagnosis from Referral: M54.50,G89.29 (ICD-10-CM) - Chronic left-sided low back pain without sciatica  Evaluation Date: 2023  Authorization Period Expiration: 23  Plan of Care Expiration: 23  Reassessment Due:23  Visit # / Visits authorized:     Time In: 1:30 PM  Time Out: 2:25PM  Total Billable Time: 55 minutes  Insurance:Healthy Back Benefit Patient     Precautions: Standard     Pattern of pain determined: 1PEN     Subjective   Darek reports mild (L)Lower back/QL discomfort presently.  He reports temporary relief with dry needling treatments.  He does report improved overall strength since the initiation of the HB program and has improved tolerance and scoring with his golf game.     Patient reports tolerating previous visit : Minimal muscular soreness  Patient reports their pain to be 2/10 on a 0-10 scale with 0 being no pain and 10 being the worst pain imaginable.  Pain Location: LLB/quadratus  Social History:  lives with their spouse  Occupation: Works in [employer] tech, sitting and standing  Leisure: fish/running /golf   Pts goals: "Get rid of pain"    Objective     Baseline Isometric Testing on Med X equipment: Testing administered by PT  Date of testin23  ROM 6-54 deg   Max Peak Torque 226    Min Peak Torque 119    Flex/Ext Ratio 1.8:1   % below normative data 16       Mid point Isometric Testing on Med X equipment: Testing administered by PT  Date of testin23  ROM 0-54 deg   Max Peak Torque 237   Min Peak Torque 161   Flex/Ext Ratio 1.4:1   % below normative data 6       5%strength increase    Limitation/Restriction for FOTO Lumbar " Survey     Therapist reviewed FOTO scores for Darek Mcnair on 4/5/2023.   FOTO documents entered into Patient [employer] - see Media section.     Limitation Score: 33%   REGINALDO 12% visit 10      Treatment    AZUCENA received the treatments listed below:      JEDIE received neuromuscular education  to isolate and engage spinal stabilization musculature correctly for motor control and coordination to aid in function and posture for 10 minutes on the Medical Medx Machine.  Patient performed MedX dynamic exercise with emphasis on spinal muscular control using pacer throughout  active range of motion. Therapist assisted patient in achieving optimal exertion for neural reeducation and endurance training by using the  Cyndi Exertion Rating scale, by instructing the patient to aim for mid range of exertion, performing 15-20 repetitions, slowly, correctly,and safely.     [facility] Therapy - Short 8/4/2023   Visit Number 19   VAS Pain Rating 2   Time 5   Lumbar Stretches - Slouch -   Extension in Lying 10   Extension in Standing 10   Manual Therapy -   Femur Restraint -   Top Dead Center -   Counterweight -   Lumbar Flexion -   Lumbar Extension -   Lumbar Peak Torque -   Lumbar Weight 103   Repetitions 19   Rating of Perceived Exertion 7      therapeutic exercises to develop strength, endurance, ROM, flexibility, posture, and core stabilization for 45 minutes including:    Fridge Magnet ex's  LTR x 10 (arms overhead)  Open book  x 5 for mobility and 5 with GTB for strengthening  Supine rotational QL stretch 5 x 10 sec  Bridging w/ Grey Tband (AROM ER at top) x 20  Prone plank hold 2 x 30 sec  Lateral plank hold (on knees) + clamshell x 10  EIL x 10  Bird dog +Sports cord resistance x 10  3 second holds  Cat/Cow x 10  EIS x 10     N/P  Cable cross lifts in 1/2 kneeling 20# x 10 each --NP  Cable cross chop downs 20# x 15 each--NP  Standing QL stretch (L) (corner wall) 5 x 10 sec--NP  Bridging c/ BTB with sports cord for UE pull down x  15x--NP  Bridging with LE's extended on T-ball x 10--NP  Lateral step outs to Paloff press on Inspire cable cross 25# x 10 reps -NP  HSS w/strap 2 x 30 sec --NP  Dying bug w/T-ball 2 x 5    Peripheral muscle strengthening which included 1 set of 15-20 repetitions at a slow, controlled 10-13 second per rep pace focused on strengthening supporting musculature for improved body mechanics and functional mobility.  Pt and therapist focused on proper form during treatment to ensure optimal strengthening of each targeted muscle group.  Machines were utilized including torso rotation, leg press, hip abd and hip add, leg ext.  Leg curl, triceps, biceps, chest and row added visit 3    manual therapy techniques: FDN L quadratus in R SL with pillow under R hip to increase RSB, 3 needles, 2 inch, followed by MHP x 5 min- NP  .   cold pack for 5 minutes to LB.      Home Exercises Provided and Patient Education Provided   Home exercises include: Standing quadratus stretch,LTR,EIS,Open book, EIL, EIS, bridge w/band, supine QL stretch, prone and lateral plank, Cat/cow, bird dog  Cardio program: visit 5 6/16/23 benefits of cardio handout provided.  Lifting education date:visit 11, 7/7/23 do's and don'ts of lifting handout provided  Posture/Lumbar roll: yes, pt states he feels it is more uncomfortable  Fridge Magnet Discharge handout (date given): 8/4/23  Equipment at home/gym membership: yes    Education provided:   - Cues w/ex's  - Fridge Magnet Go-To ex's 8/4/23  - Availability of Wellness upon d/c    Written Home Exercises Provided: Patient instructed to cont prior HEP.  Exercises were reviewed and JJ was able to demonstrate them prior to the end of the session.  JJ demonstrated good  understanding of the education provided.     See EMR under Patient Instructions for exercises provided prior visit.    Assessment   JJ returns with mild (L) lower back/QL stiffness and discomfort. Treatment continued with lumbopelvic/core  "neuromuscular re-education, flexibility and strengthening ex's. He was educated on his "Go-To Fridge Magnet HEP" this visit with good understanding. Lumbar MedX resistance was maintained at 103 ft/lbs completing 19 reps with a RPE = 7/10. He was able to complete the full circuit of peripheral strengthening ex's within appropriate muscular fatigue without increased pain. He is scheduled for his 20th visit retest next visit. He was also educated on the availability of wellness post d/c which he will consider.    Patient is making progress towards established goals.  Pt will continue to benefit from skilled outpatient physical therapy to address the deficits stated in the impairment chart, provide pt/family education and to maximize pt's level of independence in the home and community environment.     Anticipated Barriers for therapy: none  Pt's spiritual, cultural and educational needs considered and pt agreeable to plan of care and goals as stated below:    GOALS: Pt is in agreement with the following goals.     Short term goals:  6 weeks or 10 visits   1.  Pt will demonstrate increased lumbar ROM by at least 3 degrees from the initial ROM value with improvements noted in functional ROM and ability to perform ADLs.  MET  2.  Pt will demonstrate increased MedX average isometric strength value  by 20% from initial test resulting in improved ability to perform bending, lifting, and carrying activities safely, confidently.  (approp and ongoing)  3.  Patient report a reduction in worst pain score by 1-2 points for improved tolerance for sitting.  (approp and ongoing)  4.  Pt able to perform HEP correctly with minimal cueing or supervision from therapist to encourage independent management of symptoms. (approp and ongoing)      Long term goals: 10 weeks or 20 visits   1. Pt will demonstrate increased lumbar ROM by at least 6 degrees from initial ROM value, resulting in improved ability to perform functional fwd bending " while standing and sitting.MET  2. Pt will demonstrate increased MedX average isometric strength value  by 40% from initial test resulting in improved ability to perform bending, lifting, and carrying activities safely, confidently.  (approp and ongoing)  3. Pt to demonstrate ability to independently control and reduce their pain through posture positioning and mechanical movements throughout a typical day.  (approp and ongoing)  4.  Pt will demonstrate reduced pain and improved functional outcomes as reported on the FOTO by reaching a limitation score of < or = 30% or less in order to demonstrate subjective improvement in pt's condition.    (approp and ongoing)  5. Pt will demonstrate independence with the HEP at discharge  (approp and ongoing)  6.  Pt(patient goal)will report no pain or stiffness with driving > 1 hour  (approp and ongoing)     Plan   Continue with established Plan of Care towards established PT goals.     Therapist: Darek Montes, PTA  8/4/2023

## 2023-08-09 ENCOUNTER — CLINICAL SUPPORT (OUTPATIENT)
Dept: REHABILITATION | Facility: HOSPITAL | Age: 48
End: 2023-08-09
Payer: COMMERCIAL

## 2023-08-09 DIAGNOSIS — R29.898 DECREASED STRENGTH OF TRUNK AND BACK: ICD-10-CM

## 2023-08-09 DIAGNOSIS — R29.3 POOR POSTURE: Primary | ICD-10-CM

## 2023-08-09 PROCEDURE — 97750 PHYSICAL PERFORMANCE TEST: CPT | Mod: 32

## 2023-08-09 NOTE — PROGRESS NOTES
"[facility]      Name: Darek Mcnair  [facility] Number: [id number]    Therapy Diagnosis:   Encounter Diagnoses   Name Primary?    Poor posture Yes    Decreased strength of trunk and back        Physician: Chata Kilgore, *    Visit Date: 2023  Physician Orders: PT Eval and Treat   Medical Diagnosis from Referral: M54.50,G89.29 (ICD-10-CM) - Chronic left-sided low back pain without sciatica  Evaluation Date: 2023  Authorization Period Expiration: 23  Plan of Care Expiration: 23  Reassessment Due:23  Visit # / Visits authorized:     Time In 640  Time Out: 730  Total Billable Time: 45minutes  Insurance:Healthy Back Benefit Patient     Precautions: Standard     Pattern of pain determined: 1PEN     Subjective   Darek reports mild QL discomfort today.  Reports he wants to continue with wellness, but needs to coordinate with his work schedule  Patient reports tolerating previous visit : Minimal muscular soreness  Patient reports their pain to be 2/10 on a 0-10 scale with 0 being no pain and 10 being the worst pain imaginable.  Pain Location: LLB/quadratus  Social History:  lives with their spouse  Occupation: Works in [employer] tech, sitting and standing  Leisure: fish/running /golf   Pts goals: "Get rid of pain"    Objective     Baseline Isometric Testing on Med X equipment: Testing administered by PT  Date of testin23  ROM 6-54 deg   Max Peak Torque 226    Min Peak Torque 119    Flex/Ext Ratio 1.8:1   % below normative data 16       Mid point Isometric Testing on Med X equipment: Testing administered by PT  Date of testin23  ROM 0-54 deg   Max Peak Torque 237   Min Peak Torque 161   Flex/Ext Ratio 1.4:1   % below normative data 6       5%strength increase    End Isometric Testing on Med X equipment: Testing administered by PT  Date of testin23  ROM 0-54 deg   Max Peak Torque 247   Min Peak Torque 165   Flex/Ext Ratio 1.4:1   % " below normative data 10     0% strength gain overall  Limitation/Restriction for FOTO Lumbar Survey     Therapist reviewed FOTO scores for Darek Mcnair on 4/5/2023.   FOTO documents entered into [email or web address] - see Media section.     Limitation Score: 33%   REGINALDO 12% visit 10    Visit 20: 8%      Treatment    AZUCENA received the treatments listed below:      JJ received neuromuscular education  to isolate and engage spinal stabilization musculature correctly for motor control and coordination to aid in function and posture for 10 minutes on the Medical Medx Machine.  Patient performed MedX dynamic exercise with emphasis on spinal muscular control using pacer throughout  active range of motion. Therapist assisted patient in achieving optimal exertion for neural reeducation and endurance training by using the  Cyndi Exertion Rating scale, by instructing the patient to aim for mid range of exertion, performing 15-20 repetitions, slowly, correctly,and safely.   [facility] 8/9/2023   Visit Number 20   VAS Pain Rating 2   Time 5   Lumbar Stretches - Slouch Overcorrection -   Extension in Lying 10   Extension in Standing 10   Manual Therapy 5   Femur Restraint -   Top Dead Center -   Counterweight -   Lumbar Flexion 54   Lumbar Extension 0   Lumbar Peak Torque 247   Min Torque 165   Test Percent Below Normative Data 6   Test Percent Gain in Strength from Initial  0   Lumbar Weight 60   Repetitions 5   Rating of Perceived Exertion -   Ice - Z Lie (in min.) 0       therapeutic exercises to develop strength, endurance, ROM, flexibility, posture, and core stabilization for 40 minutes including:    Fridge Magnet ex's  LTR x 10 (arms overhead)  Open book  x 5 for mobility and 5 with GTB for strengthening  Supine rotational QL stretch 5 x 10 sec  Bridging w/ Grey Tband (AROM ER at top) x 20  Prone plank hold 2 x 30 sec  Lateral plank hold (on knees) + clamshell x 10  EIL x 10  Bird dog +Sports cord resistance x 10  3 second  holds  Cat/Cow x 10  EIS x 10     N/P  Cable cross lifts in 1/2 kneeling 20# x 10 each --NP  Cable cross chop downs 20# x 15 each--NP  Standing QL stretch (L) (corner wall) 5 x 10 sec--NP  Bridging c/ BTB with sports cord for UE pull down x 15x--NP  Bridging with LE's extended on T-ball x 10--NP  Lateral step outs to Paloff press on Inspire cable cross 25# x 10 reps -NP  HSS w/strap 2 x 30 sec --NP  Dying bug w/T-ball 2 x 5    Peripheral muscle strengthening which included 1 set of 15-20 repetitions at a slow, controlled 10-13 second per rep pace focused on strengthening supporting musculature for improved body mechanics and functional mobility.  Pt and therapist focused on proper form during treatment to ensure optimal strengthening of each targeted muscle group.  Machines were utilized including torso rotation, leg press, hip abd and hip add, leg ext.  Leg curl, triceps, biceps, chest and row added visit 3    manual therapy techniques: FDN L quadratus in R SL with pillow under R hip to increase RSB, 3 needles, 2 inch,   .   cold pack for 0 minutes to LB.      Home Exercises Provided and Patient Education Provided   Home exercises include: Standing quadratus stretch,LTR,EIS,Open book, EIL, EIS, bridge w/band, supine QL stretch, prone and lateral plank, Cat/cow, bird dog  Cardio program: visit 5 6/16/23 benefits of cardio handout provided.  Lifting education date:visit 11, 7/7/23 do's and don'ts of lifting handout provided  Posture/Lumbar roll: yes, pt states he feels it is more uncomfortable  Fridge Magnet Discharge handout (date given): 8/4/23  Equipment at home/gym membership: yes    Education provided:   - Cues w/ex's  - Fridge Magnet Go-To ex's 8/4/23  - Availability of Wellness upon d/c    Written Home Exercises Provided: Patient instructed to cont prior HEP.  Exercises were reviewed and AZUCENA was able to demonstrate them prior to the end of the session.  EDIEJ demonstrated good  understanding of the education  provided.     See EMR under Patient Instructions for exercises provided prior visit.    Assessment   Patient has attended 20 visits of the [facility] Back program focusing aerobic training, isometric testing with dynamic strengthening on MedX machine for spine, whole body strengthening on peripheral strengthening equipment, HEP, and patient education. Patient has completed the Healthy Back Program and is ready to be transitioned into wellness program. Educated on the importance of wellness program and attending weekly in order to maintain strength. Stressed the importance of continuing exercise and maintaining proper body mechanics and ergonomics in order to fully participate in activities of daily living, work, and leisure activities. At this time, patient demonstrates improvement in ability to reduce symptoms, improved posture, improved spinal ROM and improved strength. Discharge handout with HEP given and reviewed all information given. Patient able to demonstrate and verbalize understanding. Patient does plan to attend wellness and is appropriate for transition.     -Improved 6 ROM, initially on MedX test 6-54 and currently 0-54.  -Improved strength at each test point on lumbar med ex IM test with 0% average improvement with reduced pain noted by patient.  -Initial outcome tool score 12% and current outcome tool score 8% indicating reduced pain and improved function.          Anticipated Barriers for therapy: none  Pt's spiritual, cultural and educational needs considered and pt agreeable to plan of care and goals as stated below:    GOALS: Pt is in agreement with the following goals.     Short term goals:  6 weeks or 10 visits   1.  Pt will demonstrate increased lumbar ROM by at least 3 degrees from the initial ROM value with improvements noted in functional ROM and ability to perform ADLs.  MET  2.  Pt will demonstrate increased MedX average isometric strength value  by 20% from initial test resulting in improved  ability to perform bending, lifting, and carrying activities safely, confidently. Not met)  3.  Patient report a reduction in worst pain score by 1-2 points for improved tolerance for sitting.  Met  4.  Pt able to perform HEP correctly with minimal cueing or supervision from therapist to encourage independent management of symptoms. MET     Long term goals: 10 weeks or 20 visits   1. Pt will demonstrate increased lumbar ROM by at least 6 degrees from initial ROM value, resulting in improved ability to perform functional fwd bending while standing and sitting.MET  2. Pt will demonstrate increased MedX average isometric strength value  by 40% from initial test resulting in improved ability to perform bending, lifting, and carrying activities safely, confidently.  Not met  3. Pt to demonstrate ability to independently control and reduce their pain through posture positioning and mechanical movements throughout a typical day.  MET  4.  Pt will demonstrate reduced pain and improved functional outcomes as reported on the FOTO by reaching a limitation score of < or = 30% or less in order to demonstrate subjective improvement in pt's condition.    MET  5. Pt will demonstrate independence with the HEP at discharge MET  6.  Pt(patient goal)will report no pain or stiffness with driving > 1 hour  MET    Plan   DC to wellness    Therapist: Mercedes Conteh, PT  8/9/2023

## 2023-09-14 ENCOUNTER — TELEPHONE (OUTPATIENT)
Dept: DERMATOLOGY | Facility: CLINIC | Age: 48
End: 2023-09-14
Payer: COMMERCIAL

## 2023-09-14 NOTE — TELEPHONE ENCOUNTER
----- Message from Marychuy Santiago sent at 9/14/2023  3:00 PM CDT -----  Regarding: appt  Missed Callback     Pt returning callback from missed call. Requesting to speak with somebody in 's  office. Please call.      Caller: Mr Oswald  Reason for call:States that Alaina called him to reschedule his appt  he states he  works at  [employer] hard for him to answer his phone

## 2023-09-15 ENCOUNTER — TELEPHONE (OUTPATIENT)
Dept: DERMATOLOGY | Facility: CLINIC | Age: 48
End: 2023-09-15
Payer: COMMERCIAL

## 2023-09-15 NOTE — TELEPHONE ENCOUNTER
----- Message from Delmy Foote LPN sent at 9/14/2023  4:40 PM CDT -----  Regarding: FW: return call  Contact: @[phone]    ----- Message -----  From: Nicholas Dykes  Sent: 9/14/2023   4:39 PM CDT  To: Bety Leung Staff  Subject: return call                                      Pt is returning a missed call from.chauncey .. in regards to appt ... please call and adv @[phone]

## 2023-09-15 NOTE — TELEPHONE ENCOUNTER
----- Message from China Roy sent at 9/15/2023 11:33 AM CDT -----  Regarding: retrun call  Contact: @ [phone]  Pt is returning a missed call from Alaina ... in regards to changing his appointment ...Please call and adv @ [phone]

## 2023-11-22 ENCOUNTER — TELEPHONE (OUTPATIENT)
Dept: DERMATOLOGY | Facility: CLINIC | Age: 48
End: 2023-11-22
Payer: COMMERCIAL

## 2023-11-22 NOTE — TELEPHONE ENCOUNTER
"----- Message from Nova Barraza sent at 11/22/2023  8:27 AM CST -----  Regarding: missed call  Contact: [phone]  Name Of Caller: self     Contact Preference?:  [phone]     What is the nature of the call?: returning a call to Sivan     Additional Notes:    "Thank you for all that you do for our patients"        "

## 2023-11-22 NOTE — TELEPHONE ENCOUNTER
Tried calling pt. Lm informing pt that jam is out of office 12/8/23 and appt would be cancelled. Informed pt to call back to reschedule appt

## 2023-11-27 ENCOUNTER — OFFICE VISIT (OUTPATIENT)
Dept: DERMATOLOGY | Facility: CLINIC | Age: 48
End: 2023-11-27
Payer: COMMERCIAL

## 2023-11-27 DIAGNOSIS — D23.9 DERMATOFIBROMA: ICD-10-CM

## 2023-11-27 DIAGNOSIS — L82.1 SK (SEBORRHEIC KERATOSIS): Primary | ICD-10-CM

## 2023-11-27 DIAGNOSIS — Z85.828 HISTORY OF NONMELANOMA SKIN CANCER: ICD-10-CM

## 2023-11-27 DIAGNOSIS — L81.4 LENTIGO: ICD-10-CM

## 2023-11-27 DIAGNOSIS — D18.01 ANGIOMA OF SKIN: ICD-10-CM

## 2023-11-27 DIAGNOSIS — L73.8 SEBACEOUS GLAND HYPERPLASIA: ICD-10-CM

## 2023-11-27 PROCEDURE — 99213 PR OFFICE/OUTPT VISIT, EST, LEVL III, 20-29 MIN: ICD-10-PCS | Mod: S$GLB,,, | Performed by: DERMATOLOGY

## 2023-11-27 PROCEDURE — 99999 PR PBB SHADOW E&M-EST. PATIENT-LVL III: ICD-10-PCS | Mod: PBBFAC,,, | Performed by: DERMATOLOGY

## 2023-11-27 PROCEDURE — 1159F MED LIST DOCD IN RCRD: CPT | Mod: CPTII,S$GLB,, | Performed by: DERMATOLOGY

## 2023-11-27 PROCEDURE — 1160F RVW MEDS BY RX/DR IN RCRD: CPT | Mod: CPTII,S$GLB,, | Performed by: DERMATOLOGY

## 2023-11-27 PROCEDURE — 99213 OFFICE O/P EST LOW 20 MIN: CPT | Mod: S$GLB,,, | Performed by: DERMATOLOGY

## 2023-11-27 PROCEDURE — 99999 PR PBB SHADOW E&M-EST. PATIENT-LVL III: CPT | Mod: PBBFAC,,, | Performed by: DERMATOLOGY

## 2023-11-27 PROCEDURE — 1159F PR MEDICATION LIST DOCUMENTED IN MEDICAL RECORD: ICD-10-PCS | Mod: CPTII,S$GLB,, | Performed by: DERMATOLOGY

## 2023-11-27 PROCEDURE — 1160F PR REVIEW ALL MEDS BY PRESCRIBER/CLIN PHARMACIST DOCUMENTED: ICD-10-PCS | Mod: CPTII,S$GLB,, | Performed by: DERMATOLOGY

## 2023-11-27 NOTE — PROGRESS NOTES
Subjective:      Patient ID:  Darek Mcnair is a 48 y.o. male who presents for   Chief Complaint   Patient presents with    Lesion     Post neck    Skin Check     UBSE     History of Present Illness: The patient presents for follow up of skin check.    The patient was last seen on: 9/9/22 for ubse.  This is a high risk patient here to check for the development of new lesions.    Other skin complaints:   Patient with new complaint of lesion(s)  Location: post neck  Duration: 2 weeks  Symptoms: none  Relieving factors/Previous treatments: none      Review of Systems   Skin:  Positive for daily sunscreen use, activity-related sunscreen use and wears hat (when outside). Negative for recent sunburn.   Hematologic/Lymphatic: Does not bruise/bleed easily.       Objective:   Physical Exam   Constitutional: He appears well-developed and well-nourished. No distress.   Neurological: He is alert and oriented to person, place, and time. He is not disoriented.   Psychiatric: He has a normal mood and affect.   Skin:   Areas Examined (abnormalities noted in diagram):   Scalp / Hair Palpated and Inspected  Head / Face Inspection Performed  Neck Inspection Performed  Chest / Axilla Inspection Performed  Abdomen Inspection Performed  Genitals / Buttocks / Groin Inspection Performed  Back Inspection Performed  RUE Inspected  LUE Inspection Performed  RLE Inspected  LLE Inspection Performed  Nails and Digits Inspection Performed                 Diagram Legend     Erythematous scaling macule/papule c/w actinic keratosis       Vascular papule c/w angioma      Pigmented verrucoid papule/plaque c/w seborrheic keratosis      Yellow umbilicated papule c/w sebaceous hyperplasia      Irregularly shaped tan macule c/w lentigo     1-2 mm smooth white papules consistent with Milia      Movable subcutaneous cyst with punctum c/w epidermal inclusion cyst      Subcutaneous movable cyst c/w pilar cyst      Firm pink to brown papule c/w  dermatofibroma      Pedunculated fleshy papule(s) c/w skin tag(s)      Evenly pigmented macule c/w junctional nevus     Mildly variegated pigmented, slightly irregular-bordered macule c/w mildly atypical nevus      Flesh colored to evenly pigmented papule c/w intradermal nevus       Pink pearly papule/plaque c/w basal cell carcinoma      Erythematous hyperkeratotic cursted plaque c/w SCC      Surgical scar with no sign of skin cancer recurrence      Open and closed comedones      Inflammatory papules and pustules      Verrucoid papule consistent consistent with wart     Erythematous eczematous patches and plaques     Dystrophic onycholytic nail with subungual debris c/w onychomycosis     Umbilicated papule    Erythematous-base heme-crusted tan verrucoid plaque consistent with inflamed seborrheic keratosis     Erythematous Silvery Scaling Plaque c/w Psoriasis     See annotation      Assessment / Plan:        SK (seborrheic keratosis)  Reassurance given to patient. No treatment is necessary.   Treatment of benign, asymptomatic lesions may be considered cosmetic.    Sebaceous gland hyperplasia  This is a common condition representing benign enlargement of the sebaceous lobule. It typically occurs in adulthood. Reassurance given to patient.     Angioma of skin   - minor problem and chronic.   Reassurance given to patient. No treatment necessary.     Lentigo   - minor problem and chronic.   Reassurance given to patient. No treatment necessary.     Dermatofibroma  This is a benign scar-like lesion secondary to minor trauma. No treatment required.     History of nonmelanoma skin cancer  Area of previous nmsc examined. Site well healed with no signs of recurrence.    Total body skin examination performed today including at least 12 points as noted in physical examination. No lesions suspicious for malignancy noted.    Recommend daily sun protection/avoidance, use of at least SPF 30, broad spectrum sunscreen (OTC drug), skin  self examinations, and routine physician surveillance to optimize early detection             No follow-ups on file.

## 2024-01-12 ENCOUNTER — PATIENT MESSAGE (OUTPATIENT)
Dept: DERMATOLOGY | Facility: CLINIC | Age: 49
End: 2024-01-12
Payer: COMMERCIAL

## 2024-01-19 ENCOUNTER — OFFICE VISIT (OUTPATIENT)
Dept: INTERNAL MEDICINE | Facility: CLINIC | Age: 49
End: 2024-01-19
Payer: COMMERCIAL

## 2024-01-19 VITALS
DIASTOLIC BLOOD PRESSURE: 78 MMHG | HEART RATE: 84 BPM | WEIGHT: 223.56 LBS | OXYGEN SATURATION: 98 % | BODY MASS INDEX: 26.4 KG/M2 | SYSTOLIC BLOOD PRESSURE: 118 MMHG | HEIGHT: 77 IN

## 2024-01-19 DIAGNOSIS — M25.562 LATERAL KNEE PAIN, LEFT: Primary | ICD-10-CM

## 2024-01-19 PROCEDURE — 3074F SYST BP LT 130 MM HG: CPT | Mod: CPTII,S$GLB,, | Performed by: NURSE PRACTITIONER

## 2024-01-19 PROCEDURE — 99999 PR PBB SHADOW E&M-EST. PATIENT-LVL IV: CPT | Mod: PBBFAC,,, | Performed by: NURSE PRACTITIONER

## 2024-01-19 PROCEDURE — 1159F MED LIST DOCD IN RCRD: CPT | Mod: CPTII,S$GLB,, | Performed by: NURSE PRACTITIONER

## 2024-01-19 PROCEDURE — 3008F BODY MASS INDEX DOCD: CPT | Mod: CPTII,S$GLB,, | Performed by: NURSE PRACTITIONER

## 2024-01-19 PROCEDURE — 99213 OFFICE O/P EST LOW 20 MIN: CPT | Mod: S$GLB,,, | Performed by: NURSE PRACTITIONER

## 2024-01-19 PROCEDURE — 3078F DIAST BP <80 MM HG: CPT | Mod: CPTII,S$GLB,, | Performed by: NURSE PRACTITIONER

## 2024-01-19 RX ORDER — MELOXICAM 15 MG/1
15 TABLET ORAL DAILY PRN
Qty: 30 TABLET | Refills: 0 | Status: SHIPPED | OUTPATIENT
Start: 2024-01-19 | End: 2024-02-18

## 2024-01-19 NOTE — PROGRESS NOTES
Subjective     Patient ID: Darek Mcnair is a 48 y.o. male.    Chief Complaint: Left knee tightness and swelling x 2 weeks.    2 weeks ago bent down to tie shoes and felt sharp pain to L knee. Notices that it hurts most when playing golf; can't put his full weight on it. Feels tight over IT band. Showed wife who is a physician; noticed that L knee looked swollen and encouraged him to get his knee looked at. Knee pain is a sharp, 6-7 pain, and does not limit him too much. Pain is intermittent. Has tried ice and stretching; did not help much. Tried advil otc with no real difference in pain. No old injury to knee.     Review of Systems   Musculoskeletal:  Positive for arthralgias and joint swelling.          Objective     Physical Exam  Constitutional:       Appearance: Normal appearance.   HENT:      Head: Normocephalic and atraumatic.   Cardiovascular:      Rate and Rhythm: Normal rate.   Pulmonary:      Effort: Pulmonary effort is normal.   Musculoskeletal:         General: Swelling (left lateral knee) and tenderness (left lateral knee) present. Normal range of motion.      Left knee: No MCL laxity or ACL laxity.     Instability Tests: Anterior drawer test negative. Medial Gildardo test negative and lateral Gildardo test negative.   Skin:     General: Skin is warm and dry.   Neurological:      Mental Status: He is alert and oriented to person, place, and time.   Psychiatric:         Mood and Affect: Mood normal.         Behavior: Behavior normal.        Assessment and Plan     1. Lateral knee pain, left; pain over left ITB/lateral collateral ligaments, will treat conservatively, expect to improve in 4-6 weeks    -     meloxicam (MOBIC) 15 MG tablet; Take 1 tablet (15 mg total) by mouth daily as needed for Pain.  Dispense: 30 tablet; Refill: 0  -     RICE therapy handout given   -     ITB home care instructions given           Charles Gloria NP   Internal Medicine           Follow up if symptoms worsen or fail  to improve.

## 2024-06-17 ENCOUNTER — OFFICE VISIT (OUTPATIENT)
Dept: PODIATRY | Facility: CLINIC | Age: 49
End: 2024-06-17
Payer: COMMERCIAL

## 2024-06-17 VITALS
DIASTOLIC BLOOD PRESSURE: 80 MMHG | BODY MASS INDEX: 26.4 KG/M2 | HEART RATE: 65 BPM | WEIGHT: 223.56 LBS | HEIGHT: 77 IN | SYSTOLIC BLOOD PRESSURE: 134 MMHG

## 2024-06-17 DIAGNOSIS — R20.2 PARESTHESIA: Primary | ICD-10-CM

## 2024-06-17 PROCEDURE — 3079F DIAST BP 80-89 MM HG: CPT | Mod: CPTII,S$GLB,, | Performed by: PODIATRIST

## 2024-06-17 PROCEDURE — 3008F BODY MASS INDEX DOCD: CPT | Mod: CPTII,S$GLB,, | Performed by: PODIATRIST

## 2024-06-17 PROCEDURE — 99203 OFFICE O/P NEW LOW 30 MIN: CPT | Mod: S$GLB,,, | Performed by: PODIATRIST

## 2024-06-17 PROCEDURE — 99999 PR PBB SHADOW E&M-EST. PATIENT-LVL III: CPT | Mod: PBBFAC,,, | Performed by: PODIATRIST

## 2024-06-17 PROCEDURE — 1159F MED LIST DOCD IN RCRD: CPT | Mod: CPTII,S$GLB,, | Performed by: PODIATRIST

## 2024-06-17 PROCEDURE — 3075F SYST BP GE 130 - 139MM HG: CPT | Mod: CPTII,S$GLB,, | Performed by: PODIATRIST

## 2024-06-18 NOTE — PROGRESS NOTES
"Subjective:     Patient ID: Darek Mcnair is a 49 y.o. male.    Chief Complaint: Foot Pain (Left foot )    Reports feeling of "tightness" left foot x several weeks. Denies trauma to area.     Review of Systems   Constitutional: Negative for chills.   Cardiovascular:  Negative for chest pain and claudication.   Respiratory:  Negative for cough.    Skin:  Positive for color change, dry skin and nail changes.   Musculoskeletal:  Positive for joint pain.   Gastrointestinal:  Negative for nausea.   Neurological:  Positive for paresthesias. Negative for numbness.   Psychiatric/Behavioral:  The patient is not nervous/anxious.         Objective:     Physical Exam  Constitutional:       Appearance: He is well-developed.      Comments: Oriented to time, place, and person.   Cardiovascular:      Comments: DP and PT pulses are palpable bilaterally. 3 sec capillary refill time and toes and feet are warm to touch proximally .  There is  hair growth on the feet and toes b/l. There is no edema b/l. No spider veins or varicosities present b/l.     Musculoskeletal:      Comments: Equinus noted b/l ankles with < 10 deg DF noted. MMT 5/5 in DF/PF/Inv/Ev resistance with no reproduction of pain in any direction. Passive range of motion of ankle and pedal joints is painless b/l.     Feet:      Right foot:      Skin integrity: No callus or dry skin.      Left foot:      Skin integrity: No callus or dry skin.   Lymphadenopathy:      Comments: Negative lymphadenopathy bilateral popliteal fossa and tarsal tunnel.   Skin:     Comments: No open lesions, lacerations or wounds noted.Interdigital spaces clean, dry and intact b/l. No erythema noted to b/l foot.  Nails normal color and trophic qualities.     Neurological:      Mental Status: He is alert.      Comments: Light touch, proprioception, and sharp/dull sensation are all intact bilaterally. Protective threshold with the [place]-Wienstein monofilament is intact bilaterally.      " "Subjective paresthesias with no clearly identifiable source or trigger.        Psychiatric:         Behavior: Behavior is cooperative.           Assessment:      Encounter Diagnosis   Name Primary?    Paresthesia Yes     Plan:     Darek CIFUENTES" was seen today for foot pain.    Diagnoses and all orders for this visit:    Paresthesia      I counseled the patient on his conditions, their implications and medical management.        Discussed conservative treatment with shoes of adequate dimensions, material, and style to alleviate symptoms and delay or prevent surgical intervention.    Information given on [employer] orthotics.     RTC PRN     "

## 2024-06-25 ENCOUNTER — HOSPITAL ENCOUNTER (OUTPATIENT)
Dept: RADIOLOGY | Facility: HOSPITAL | Age: 49
Discharge: HOME | End: 2024-06-25
Attending: ORTHOPAEDIC SURGERY
Payer: COMMERCIAL

## 2024-06-25 ENCOUNTER — OFFICE VISIT (OUTPATIENT)
Dept: ORTHOPEDICS | Facility: CLINIC | Age: 49
End: 2024-06-25
Payer: COMMERCIAL

## 2024-06-25 DIAGNOSIS — G57.62 MORTON'S NEUROMA OF SECOND INTERSPACE OF LEFT FOOT: ICD-10-CM

## 2024-06-25 DIAGNOSIS — M21.6X2 ACQUIRED CAVOVARUS DEFORMITY OF LEFT FOOT: Primary | ICD-10-CM

## 2024-06-25 DIAGNOSIS — M79.672 LEFT FOOT PAIN: ICD-10-CM

## 2024-06-25 DIAGNOSIS — M79.672 LEFT FOOT PAIN: Primary | ICD-10-CM

## 2024-06-25 PROCEDURE — 99999 PR PBB SHADOW E&M-EST. PATIENT-LVL II: CPT | Mod: PBBFAC,,, | Performed by: ORTHOPAEDIC SURGERY

## 2024-06-25 PROCEDURE — 73630 X-RAY EXAM OF FOOT: CPT | Mod: 26,LT,, | Performed by: RADIOLOGY

## 2024-06-25 PROCEDURE — 73610 X-RAY EXAM OF ANKLE: CPT | Mod: TC,PO,LT

## 2024-06-25 PROCEDURE — 73610 X-RAY EXAM OF ANKLE: CPT | Mod: 26,LT,, | Performed by: RADIOLOGY

## 2024-06-25 PROCEDURE — 73630 X-RAY EXAM OF FOOT: CPT | Mod: TC,PO,LT

## 2024-06-25 RX ORDER — ZOLPIDEM TARTRATE 10 MG/1
10 TABLET ORAL NIGHTLY
COMMUNITY
Start: 2024-06-10

## 2024-06-25 RX ORDER — TRIAMCINOLONE ACETONIDE 40 MG/ML
20 INJECTION, SUSPENSION INTRA-ARTICULAR; INTRAMUSCULAR
Status: DISCONTINUED | OUTPATIENT
Start: 2024-06-25 | End: 2024-06-25 | Stop reason: HOSPADM

## 2024-06-25 RX ADMIN — TRIAMCINOLONE ACETONIDE 20 MG: 40 INJECTION, SUSPENSION INTRA-ARTICULAR; INTRAMUSCULAR at 03:06

## 2024-06-25 NOTE — PROCEDURES
Neuroma injection    Date/Time: 6/25/2024 3:45 PM    Performed by: Charles Moore MD  Authorized by: Charles Moore MD    Consent Done?:  Yes (Written)  Indications:  Pain  Site marked: the procedure site was marked    Timeout: prior to procedure the correct patient, procedure, and site was verified    Prep: patient was prepped and draped in usual sterile fashion      Local anesthesia used?: Yes   Anesthesia:  Local infiltration  Local anesthetic:  Bupivacaine 0.25% without epinephrine and lidocaine 1% without epinephrine  Location Left Foot:  Second Webspace  Needle size:  25 G  Approach:  Dorsal  Medications:  20 mg triamcinolone acetonide 40 mg/mL  Patient tolerance:  Patient tolerated the procedure well with no immediate complications

## 2024-06-25 NOTE — PROGRESS NOTES
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  Date of Surgery: none  Date of Injury: none  Return visit: PRN  X-rays on Return: pending patient complaint    Chief Complaint:   Chief Complaint   Patient presents with    Left Foot - Pain, Numbness     Present History:  Patient presents today via referral from [facility]   Darek Mcnair is a 49 y.o. male with a 6+ week history persistent left forefoot tightness, numbness, and discomfort.  Denies any pain.  Initially began having this sensation of the end of the day related to increased activity.  Now with more persistent feelings of tightness that begin 1st thing in the morning upon getting out of bed.    No subjective radiating symptoms.  Previously seen by DPM who recommended shoe wear and activity modification.  Patient recently obtained new [place] shoes with minimal symptomatic relief.  Some over-the-counter oral NSAID use with mild-to-moderate relief.  No history of physical therapy, corticosteroid injection, surgical mention, etc.   Denies any nighttime symptoms.  Otherwise healthy.  Uses nicotine pouch.  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      Past Medical History:   Diagnosis Date    Squamous cell carcinoma of skin        Past Surgical History:   Procedure Laterality Date    EYE SURGERY      WISDOM TOOTH EXTRACTION         Current Outpatient Medications   Medication Sig    zolpidem (AMBIEN) 10 mg Tab Take 10 mg by mouth every evening.    multivitamin (THERAGRAN) per tablet Take 1 tablet by mouth once daily.    omega-3 fatty acids/fish oil (FISH OIL-OMEGA-3 FATTY ACIDS) 300-1,000 mg capsule Take by mouth once daily.     Current Facility-Administered Medications   Medication    fluorescein 500 mg/5 mL (10 %) injection 500 mg       Review of patient's allergies indicates:  No Known Allergies    Family History   Problem Relation Name Age of Onset    Heart disease Maternal Aunt      Heart disease Maternal Uncle       Heart disease Maternal Grandmother      Heart disease Maternal Grandfather         Social History     Socioeconomic History    Marital status:    Tobacco Use    Smoking status: Former     Types: Cigarettes    Smokeless tobacco: Never    Tobacco comments:     5 cigs/day for 5 years   Substance and Sexual Activity    Alcohol use: Yes     Alcohol/week: 2.0 standard drinks of alcohol     Types: 2 Cans of beer per week     Comment: social    Drug use: No    Sexual activity: Yes     Social Determinants of Health     Financial Resource Strain: Patient Declined (11/27/2023)    Overall Financial Resource Strain (CARDIA)     Difficulty of Paying Living Expenses: Patient declined   Food Insecurity: Patient Declined (11/27/2023)    Hunger Vital Sign     Worried About Running Out of Food in the Last Year: Patient declined     Ran Out of Food in the Last Year: Patient declined   Transportation Needs: No Transportation Needs (11/27/2023)    PRAPARE - Transportation     Lack of Transportation (Medical): No     Lack of Transportation (Non-Medical): No   Physical Activity: Unknown (11/27/2023)    Exercise Vital Sign     Days of Exercise per Week: 1 day   Stress: Stress Concern Present (11/27/2023)    [place] [facility] [employer] - Occupational Stress Questionnaire     Feeling of Stress : To some extent   Housing Stability: Unknown (11/27/2023)    Housing Stability Vital Sign     Unable to Pay for Housing in the Last Year: Patient refused     Unstable Housing in the Last Year: Patient refused       Physical exam:  There were no vitals filed for this visit.  There is no height or weight on file to calculate BMI.  General: In no apparent distress; well developed and well nourished.  HEENT: normocephalic; atraumatic.  Cardiovascular: regular rate.  Respiratory: no increased work of breathing.  Musculoskeletal:   Gait: nonantalgic  Inspection:   Subtle cavus.  Hindfoot does passively correct.  Also corrects with Eugenio  block testing.    No subjective lateral overload symptoms.    Patient localizes tightness/fullness along the plantar forefoot only on the left.  No tenderness over the metatarsal heads.  No ananda swelling, redness, warmth noted.    Patient with equivocal metatarsal squeeze testing.  Significant tenderness on deep palpation over the dorsal 2nd intermetatarsal space and less so over the 3rd and 4th.  Unable to recreate tenderness on deep palpation in the same location but plantar.    No pain referable to the ankle.  No laxity with anterior drawer testing.  Silfverskiold: Negative  Alignment:  Knee: neutral               Ankle: neutral              Hindfoot: subtle cavus              Forefoot: neutral   Strength:              Dorsiflexion 5/5  Plantar flexion 5/5  Inversion 5/5  Eversion 5/5  Sensation:              SILT distally  ROM:              Ankle: full and painless              Subtalar: full and painless  Pulses: Palpable pedal pulse                   Imaging Studies/Outside documentation:  I have ordered/reviewed/interpreted the following images/outside documentation:  1. Weight-bearing 3-views of Left foot and ankle:   On my independent review, no acute bony abnormality noted.  Mild-to-moderate increased calcaneal pitch.  (+) talonavicular over coverage.  Moderate metatarsus adductus.  Subtle flattening of the 2nd metatarsal head.  Joint space well-maintained.  Ankle mortise remains congruent.        Assessment:  Darek Mcnair is a 49 y.o. 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     Plan:   Clinical and radiographic findings were discussed.  No complaints as it pertains to subtle cavus today however patient has had peroneal tendinitis in the past that was treated conservatively.    I did provide a handout for a foot scientific arch support inserts.    Regarding his current complaints, patient does have atypical presentation for Beasley neuroma given pain localized to 2/3/4  intermetatarsal spaces.  Diagnostic/therapeutic 2nd intermetatarsal space corticosteroid injection performed today without complication.  Patient tolerated this well.  See procedure note for details.    Patient voiced understanding.  All questions were answered.  He will call regarding future follow-up should symptoms persist or worsen.    This note was created using voice recognition software and may contain grammatical errors.

## 2024-06-27 ENCOUNTER — PATIENT MESSAGE (OUTPATIENT)
Dept: ORTHOPEDICS | Facility: CLINIC | Age: 49
End: 2024-06-27
Payer: COMMERCIAL

## 2024-07-03 ENCOUNTER — PATIENT MESSAGE (OUTPATIENT)
Dept: ORTHOPEDICS | Facility: CLINIC | Age: 49
End: 2024-07-03
Payer: COMMERCIAL

## 2024-07-03 ENCOUNTER — TELEPHONE (OUTPATIENT)
Dept: ORTHOPEDICS | Facility: CLINIC | Age: 49
End: 2024-07-03
Payer: COMMERCIAL

## 2024-07-03 DIAGNOSIS — G57.62 MORTON'S NEUROMA OF SECOND INTERSPACE OF LEFT FOOT: Primary | ICD-10-CM

## 2024-07-03 NOTE — TELEPHONE ENCOUNTER
See [employer] message 7/3.  Per Dr Moore, order L MRI without contrast. I ordered and called pt and scheduled MRI for 7/16 at 4:30.

## 2024-07-16 ENCOUNTER — HOSPITAL ENCOUNTER (OUTPATIENT)
Dept: RADIOLOGY | Facility: HOSPITAL | Age: 49
Discharge: HOME | End: 2024-07-16
Attending: ORTHOPAEDIC SURGERY
Payer: COMMERCIAL

## 2024-07-16 DIAGNOSIS — G57.62 MORTON'S NEUROMA OF SECOND INTERSPACE OF LEFT FOOT: ICD-10-CM

## 2024-07-16 PROCEDURE — 73718 MRI LOWER EXTREMITY W/O DYE: CPT | Mod: TC,PO,LT

## 2024-07-16 PROCEDURE — 73718 MRI LOWER EXTREMITY W/O DYE: CPT | Mod: 26,LT,, | Performed by: RADIOLOGY

## 2024-07-18 ENCOUNTER — PATIENT MESSAGE (OUTPATIENT)
Dept: ORTHOPEDICS | Facility: CLINIC | Age: 49
End: 2024-07-18
Payer: COMMERCIAL

## 2024-07-19 DIAGNOSIS — G57.92 NEURITIS OF LEFT FOOT: ICD-10-CM

## 2024-07-19 DIAGNOSIS — G57.62 MORTON'S NEUROMA OF SECOND INTERSPACE OF LEFT FOOT: Primary | ICD-10-CM

## 2024-07-19 NOTE — PROGRESS NOTES
Contacted the patient via telephone today to discuss MRI results.  No clear pathology noted despite marker placed on area of interest at the 2nd intermetatarsal space.  Patient endorses no relief from previous 2nd intermetatarsal space corticosteroid injection.  Also with little to no relief with implementation of foot scientific arch support inserts.    Patient has had difficulty driving his manual vehicle and has had to take his shoes off due to pain with closed toed shoe wear for long periods.    Specifically endorses what he describes as splaying of the 2nd and 3rd toe/metatarsals.  Patient could potentially benefit from wider toe box shoes.    If symptoms persist despite physical therapy, consider referral to interventional pain management for evaluation and treatment.

## 2024-07-26 ENCOUNTER — PATIENT MESSAGE (OUTPATIENT)
Dept: ORTHOPEDICS | Facility: CLINIC | Age: 49
End: 2024-07-26

## 2024-07-26 ENCOUNTER — OFFICE VISIT (OUTPATIENT)
Dept: ORTHOPEDICS | Facility: CLINIC | Age: 49
End: 2024-07-26
Payer: COMMERCIAL

## 2024-07-26 VITALS — BODY MASS INDEX: 26.4 KG/M2 | HEIGHT: 77 IN | WEIGHT: 223.56 LBS

## 2024-07-26 DIAGNOSIS — Q66.71 PES CAVUS OF BOTH FEET: ICD-10-CM

## 2024-07-26 DIAGNOSIS — Q66.72 PES CAVUS OF BOTH FEET: ICD-10-CM

## 2024-07-26 DIAGNOSIS — G57.62 MORTON'S NEUROMA OF SECOND INTERSPACE OF LEFT FOOT: Primary | ICD-10-CM

## 2024-07-26 PROCEDURE — 64455 NJX AA&/STRD PLTR COM DG NRV: CPT | Mod: LT,S$GLB,, | Performed by: ORTHOPAEDIC SURGERY

## 2024-07-26 PROCEDURE — 1160F RVW MEDS BY RX/DR IN RCRD: CPT | Mod: CPTII,S$GLB,, | Performed by: ORTHOPAEDIC SURGERY

## 2024-07-26 PROCEDURE — 99999 PR PBB SHADOW E&M-EST. PATIENT-LVL III: CPT | Mod: PBBFAC,,, | Performed by: ORTHOPAEDIC SURGERY

## 2024-07-26 PROCEDURE — 3008F BODY MASS INDEX DOCD: CPT | Mod: CPTII,S$GLB,, | Performed by: ORTHOPAEDIC SURGERY

## 2024-07-26 PROCEDURE — 99213 OFFICE O/P EST LOW 20 MIN: CPT | Mod: 25,S$GLB,, | Performed by: ORTHOPAEDIC SURGERY

## 2024-07-26 PROCEDURE — 1159F MED LIST DOCD IN RCRD: CPT | Mod: CPTII,S$GLB,, | Performed by: ORTHOPAEDIC SURGERY

## 2024-07-26 RX ORDER — LIDOCAINE HYDROCHLORIDE 20 MG/ML
2 INJECTION, SOLUTION INFILTRATION; PERINEURAL
Status: COMPLETED | OUTPATIENT
Start: 2024-07-26 | End: 2024-07-26

## 2024-07-26 RX ADMIN — LIDOCAINE HYDROCHLORIDE 2 ML: 20 INJECTION, SOLUTION INFILTRATION; PERINEURAL at 02:07

## 2024-07-26 NOTE — PROGRESS NOTES
DATE: 7/26/2024  PATIENT: Darek Mcnair    CHIEF COMPLAINT: 2nd opinion for left foot/2nd toe pain     HISTORY:  Darek Mcnair is a 49 y.o. male w/ PMH of chronic low back pain w/out radicular symptoms here for evaluation of left foot/2nd toe pain. His pain started in early May and has progressed. His primary pain is over the 2nd and 3rd toes, he describes a throbbing and numb sensation in the 2nd toe. His pain and numbness is constant now. He denies injury and any recent changes in activity.    He saw Dr. Patel in mid-June who recommended orthotics, pt states this provided him no relief. Late June, he got a second opinion with Dr. Moore, who diagnosed him with possible atypical Florian's neuroma. He got an injection in 2nd webspace then. He states he had some initial pain relief within the first few hours, but no long term relief from this injection. Pt then got an MRI of the left foot (7/16) which was negative for structural damage. Meanwhile, the patient's symptoms have been worsening. He then started wearing metatarsal pad - no pain relief. Pt spoke to Dr. Hou who recommended he see Dr. Caceres for his foot pain. He is set up to start PT on July 31st.       PAST MEDICAL/SURGICAL HISTORY:  Past Medical History:   Diagnosis Date    Squamous cell carcinoma of skin      Past Surgical History:   Procedure Laterality Date    EYE SURGERY      WISDOM TOOTH EXTRACTION         Current Medications:   Current Outpatient Medications:     multivitamin (THERAGRAN) per tablet, Take 1 tablet by mouth once daily., Disp: , Rfl:     omega-3 fatty acids/fish oil (FISH OIL-OMEGA-3 FATTY ACIDS) 300-1,000 mg capsule, Take by mouth once daily., Disp: , Rfl:     zolpidem (AMBIEN) 10 mg Tab, Take 10 mg by mouth every evening., Disp: , Rfl:     Current Facility-Administered Medications:     fluorescein 500 mg/5 mL (10 %) injection 500 mg, 5 mL, Intravenous, Once, COLLEEN Garcia MD    Social History:   Social History      Socioeconomic History    Marital status:    Tobacco Use    Smoking status: Former     Types: Cigarettes    Smokeless tobacco: Never    Tobacco comments:     5 cigs/day for 5 years   Substance and Sexual Activity    Alcohol use: Yes     Alcohol/week: 2.0 standard drinks of alcohol     Types: 2 Cans of beer per week     Comment: social    Drug use: No    Sexual activity: Yes     Social Determinants of Health     Financial Resource Strain: Patient Declined (11/27/2023)    Overall Financial Resource Strain (CARDIA)     Difficulty of Paying Living Expenses: Patient declined   Food Insecurity: Patient Declined (11/27/2023)    Hunger Vital Sign     Worried About Running Out of Food in the Last Year: Patient declined     Ran Out of Food in the Last Year: Patient declined   Transportation Needs: No Transportation Needs (11/27/2023)    PRAPARE - Transportation     Lack of Transportation (Medical): No     Lack of Transportation (Non-Medical): No   Physical Activity: Unknown (11/27/2023)    Exercise Vital Sign     Days of Exercise per Week: 1 day   Stress: Stress Concern Present (11/27/2023)    [place] [employer]ational [employer] - Occupational Stress Questionnaire     Feeling of Stress : To some extent   Housing Stability: Unknown (11/27/2023)    Housing Stability Vital Sign     Unable to Pay for Housing in the Last Year: Patient refused     Unstable Housing in the Last Year: Patient refused       REVIEW OF SYSTEMS:  Constitution: Negative. Negative for chills, fever and night sweats.   Cardiovascular: Negative for chest pain and syncope.   Respiratory: Negative for cough and shortness of breath.   Gastrointestinal: See HPI. Negative for nausea/vomiting. Negative for abdominal pain.  Genitourinary: See HPI. Negative for discoloration or dysuria.  Skin: Negative for dry skin, itching and rash.   Hematologic/Lymphatic: Negative for bleeding problem. Does not bruise/bleed easily.   Musculoskeletal: Negative for  "falls and muscle weakness.   Neurological: See HPI. No seizures.   Endocrine: Negative for polydipsia, polyphagia and polyuria.   Allergic/Immunologic: Negative for hives and persistent infections.    PHYSICAL EXAMINATION:    Ht 6' 5" (1.956 m)   Wt 101.4 kg (223 lb 8.7 oz)   BMI 26.51 kg/m²     General: The patient is a 49 y.o. male in no apparent distress, the patient is oriented to person, place and time.   Psych: Normal mood and affect  HEENT:  NCAT, sclera nonicteric  Lungs:  Respirations are equal and unlabored.  CV:  2+ bilateral upper and lower extremity pulses.  Skin:  Intact throughout.  Musculoskeletal: No pain with the range of motion of the bilateral hips. No trochanteric tenderness to palpation. No pain with range of motion about the bilateral knees.      Left Foot and Ankle Exam    INSPECTION:        Gait:    Normal    Scars:   None   Swelling:  None   Color:   Normal   Atrophy:  None  Heel / Toe Walking: No difficulty    ALIGNMENT:    Hindfoot  Normal    Midfoot: High arches bilaterally  Forefoot: Normal     Collective Ankle-Hindfoot Alignment    Good - plantigrade (PG), well aligned        TENDERNESS:  LATERAL:      Sinus tarsi:  None    Syndesmosis:  None     ATFL:   None      CFL:   None     Anterolateral gutter: None    Fibula:   None   Peroneal tendons: None     Peroneal tubercle.  None     ANTERIOR:  Anteromedial joint line:  None   Anterolateral joint line:  None  Talonavicular:    None  Anterior tibialis:   None   Extensor tendons:   None     POSTERIOR:  Medial/lateral achilles:  None   Medial/lateral achilles insertion: None    MEDIAL:      Deltoid:  None     Malleolus:  None     PTT:   None     Navicular:  None            CALCANEUS:  Retrocalcaneal:   None   Medial achilles:   None  Lateral achilles:   None  Calcaneal tuberosity:   None    FOOT:    Calcaneal cuboid  None    MT / MT heads:  None   Navicular   None     Medial cord origin PF:  None   Cuneiforms:   None     Web " space:   Moderate TTP 2nd webspace   Lisfranc    None     Tarsal tunnel:   None   Base of the fifth metatarsal  None    Tinels sign   Negative        RANGE OF MOTION:  RIGHT/ LEFT (* = pain)     Ankle DF/PF:  15/45  15/45         Eversion/Inversion: 15/25 15/25     Midfoot ABD/ADD: 10/10 10/10     First MTP DF/PF: 60/25 60/25               STRENGTH: (affected) (* = pain)  Anterior tibialis: 5/5  Posterior tibialis: 5/5  Gastroc-soleus: 5/5  Peroneals:  5/5  EHL:   5/5  FHL:   5/5        SPECIAL TESTS:   ANKLE INSTABILITY: (*pain)    Anterior drawer:   Normal      (C-W contralateral side)     Inversion:   30°     Eversion  10°            Collective Instability: (Ant-post and varus-valgus)     Stable        PROVOCATIVE TESTING:    Forced DF/ER: No pain at syndesmosis.    Mid-leg squeeze  No pain at syndesmosis.    Forced DF:  No pain anterior joint line.      Forced PF:  No pain posterior ankle.     Forced INV:  No pain laterally.    Forced EV:  No pain medially.    Perezs sign: Normal ankle plantar flexion.     Resisted peroneal No subluxation or pain.    1st-2nd MT toggle No pain at Lisfranc.    MT-T torque  No pain at Lisfranc.     NEUROLOGIC TESTING:  All dermatomes foot, ankle and leg have normal sensation light touch (Pt reports mild numbness over 2nd digit and 2nd webspace)  Ankle Reflexes 2+, symmetric   Negative Babinski and No Clonus    VASCULAR:  2+ pulses PT/DT with brisk capillary refill toes.        IMAGING:   Radiographs of the left foot and ankle were personally reviewed with the patient today. Small heel spur otherwise negative x-rays of the left foot & ankle.    MRI of the left forefoot was personally reviewed with the patient today.  FINDINGS:  LIGAMENTS: Dorsal and interosseous components of the Lisfranc ligament are intact.  Plantar components are not well seen.  TENDONS: No tendinosis or tenosynovitis.  BONES: No acute fractures.  No avascular necrosis.  No marrow infiltrative  "process.  JOINTS/CARTILAGE: No high-grade partial or full-thickness chondral defects.  No osteochondral lesions.  No joint effusion.  No subluxation or dislocation.  MUSCLES: Normal bulk and signal intensity.  MISCELLANEOUS: Plantar aponeurosis appears within normal limits.  Hallux plantar plate complex and lesser MTP plantar plates appear intact.  Slight signal heterogeneity at the 2nd and 3rd intermetatarsal spaces without discrete lesions.  Trace fluid distension of the 3rd intermetatarsal bursa.  Impression:  1. No MR imaging findings to account for the reported history of metatarsalgia.  No Florian's neuroma.  Electronically signed by:Uriel Rascon MD  Date:                                            07/17/2024  Time:                                           09:10        ASSESSMENT/PLAN:    Darek CIFUENTES" was seen today for pain.    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 yo male with PMH of chronic low back pain w/out radicular symptoms presents with worsening of left foot 2nd toe pain/numbness, imaging reveals no structural abnormalities, likely has neurogenic pain - likely Florian's neuroma of 2nd webspace of left foot.    Plan: The patient and I had a thorough discussion today.  We discussed the working diagnosis as well as several other potential alternative diagnoses.  Treatment options were discussed, both conservative and surgical.  Conservative treatment options would include things such as activity modifications, a period of rest, tylenol, anti-inflammatory medications, physical therapy, immobilization, corticosteroid injections, and others.     At this time, the patient would like to proceed with non-operative management, " including EMG/NCV for further investigation of neurogenic pain. Diagnostic lidocaine injection given today. Encouraged patient to monitor pain/improvement over the next few hours after injection.    Return to clinic after EMG/NCV studies.        Marie Martinez MD  Orthopaedic Surgery, PGY1  07/26/2024     I have personally taken the history and examined this patient and agree with the residents note as stated above.  After verbal consent and sterile prep, I performed a diagnostic injection of the left 2nd intermetatarsal space with 2 cc lidocaine.  If he has good relief over the next couple of hours then it is likely that he has a Florian's neuroma.  If he does not have good relief then I believe he should proceed with the EMG nerve conduction study, and we could also consider trying a diagnostic injection of the 3rd intermetatarsal space.

## 2024-07-31 ENCOUNTER — CLINICAL SUPPORT (OUTPATIENT)
Dept: REHABILITATION | Facility: HOSPITAL | Age: 49
End: 2024-07-31
Attending: ORTHOPAEDIC SURGERY
Payer: COMMERCIAL

## 2024-07-31 DIAGNOSIS — G57.92 NEURITIS OF LEFT FOOT: ICD-10-CM

## 2024-07-31 DIAGNOSIS — G57.62 MORTON'S NEUROMA OF SECOND INTERSPACE OF LEFT FOOT: ICD-10-CM

## 2024-07-31 DIAGNOSIS — M79.672 LEFT FOOT PAIN: Primary | ICD-10-CM

## 2024-07-31 PROCEDURE — 97161 PT EVAL LOW COMPLEX 20 MIN: CPT | Mod: PN

## 2024-07-31 PROCEDURE — 97110 THERAPEUTIC EXERCISES: CPT | Mod: PN

## 2024-07-31 PROCEDURE — 97140 MANUAL THERAPY 1/> REGIONS: CPT | Mod: PN

## 2024-07-31 NOTE — PLAN OF CARE
[facility]   Physical Therapy Initial Evaluation     Date: 7/31/2024   Name: Darek Bolton [facility] Number: [id number]    Therapy Diagnosis:   Encounter Diagnoses   Name Primary?    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  The symptoms began progressing with frequency and intensity of discomfort and went to podiatrist, got new shoes and orthotics.  Patient reports his toes became a little more painful and was evaluated by orthopedic surgeon.  He received injection that did not help.  His toes are starting to get numb and was referred to MRI.  Patient went to foot/ankle specialist and received another injection that also aggravated symptoms.  Patient is scheduled for EMG.  He has not been performing normal recreational activities such as golfing or jogging.      Falls: no    Imaging, MRI studies:   Impression:   1. No MR imaging findings to account for the reported history of metatarsalgia.  No Florian's neuroma.    Prior Therapy: yes for right ankle  Social History:  lives with their spouse  Occupation: eye technician for [employer]  Prior Level of Function: running once a week and playing golf on weekends  Current Level of Function: not playing golf or  running    Pain:  Current 2/10, worst 4/10, best 0/10   Location: left foot (2nd and 3rd toe)  Description: Tight, Tingling, and Numb  Aggravating Factors: Standing and Walking  Easing Factors: rest    Patients goals: get rid of discomfort in his left foot and return to playing golf and running     Medical History:   Past Medical History:   Diagnosis Date    Squamous cell carcinoma of skin        Surgical History:   Darek Mcnair  has a past surgical history that includes Eye surgery and [place]ough tooth extraction.    Medications:   Darek has a current medication list which includes the following prescription(s): multivitamin, fish oil-omega-3 fatty acids, and zolpidem, and the following Facility-Administered Medications: fluorescein.    Allergies:   Review of patient's allergies indicates:  No Known Allergies       OBJECTIVE     Structural/Postural Inspection: pes cavus    Circumference/Swelling:       Right  Left  Figure 8:                       54.2    53.5  MTP:                    25    24  Mid Foot:     24    24    Active Range of Motion (AROM)/Passive Range of Motion (PROM):     Hip/Knee/Ankle (Degrees) AROM (Right) AROM (Left) Comments   Ankle Dorsiflexion 12 15    Ankle Plantarflexion 50 50    Ankle Inversion 40 40    Ankle Eversion 15 15      Joint Accessory: normal    Muscle Length Tension Testing:     Lumbar/Hip/Knee Right  Left  Comments   Gastrocnemius 12 15    Hamstrings- 90/90 20 20      Manual Muscle Testing:     RLE Strength Grade LLE Strength Grade   Ankle Dorsiflexion 5/5 Ankle Dorsiflexion 5/5   Ankle Plantarflexion 5/5 Ankle Plantarflexion 5/5   Ankle Inversion 5/5 Ankle Inversion 5/5   Ankle Eversion 5/5 Ankle Eversion 5/5           Palpation for Tenderness:  tenderness to palpation in intrinsics     Sensation: intact    Neuro Testing Right  Left Comments   Seated Slump Negative. Negative.    SLR (Sciatic) Negative. Positive.    Sural Neural Tension Negative. Negative.    Peroneal Neural  Tension Negative. Negative.             Intake Outcome Measure for FOTO Ankle Survey    Therapist reviewed FOTO scores for Darek Mcnair on 7/31/2024.   FOTO documents entered into [facility] - see Media section.    Intake Score: 57%           TREATMENT     Total Treatment time (time-based codes) separate from Evaluation: 23 minutes     AZUCENA received the treatments listed below:       therapeutic exercises to develop strength, endurance, ROM, flexibility, posture, and core stabilization for (8)  minutes including:  - patient education, instruction and demonstration of HEP     manual therapy techniques applied for (15) minutes, including:  - STM to gastroc, soleus, foot intrinsics musculature  - Grade 3 joint mobilizations of tarsals and metatarsals  - passive stretching of toes        PATIENT EDUCATION AND HOME EXERCISES     Education provided:   - role of PT and goals for therapy  - HEP    Written Home Exercises Provided: yes. Exercises were reviewed and AZUCENA was able to demonstrate them prior to the end of the session.  EDIEJ demonstrated good  understanding of the education provided. See EMR under Patient Instructions for exercises provided during therapy sessions.    ASSESSMENT     Darek is a 49 y.o. male referred to outpatient Physical Therapy with a medical diagnosis of Florian's neuroma, neuritis of left foot.  Patient presents to therapy with signs and symptoms consistent with the diagnosis.  He is noted to ambulate with normal gait, stands with pes cavus, normal ROM of ankle, tenderness in surrounding musculature, and discomfort in left foot limiting his tolerance to golf and normal running routine.  Pt would benefit from manual therapy, LE stretching and strengthening, gait and balance training, and modalities to decrease pain, improve functional mobility, and return to prior level of function.      Patient prognosis is Good.   Patientt will benefit from skilled outpatient Physical Therapy to address the deficits  stated above and in the chart below, provide patient /family education, and to maximize patientt's level of independence.     Plan of care discussed with patient: Yes  Patient's spiritual, cultural and educational needs considered and patient is agreeable to the plan of care and goals as stated below:     Anticipated Barriers for therapy: none    Medical Necessity is demonstrated by the following  History  Co-morbidities and personal factors that may impact the plan of care [x] LOW: no personal factors / co-morbidities  [] MODERATE: 1-2 personal factors / co-morbidities  [] HIGH: 3+ personal factors / co-morbidities    Moderate / High Support Documentation:   Co-morbidities affecting plan of care:     Personal Factors:   no deficits     Examination  Body Structures and Functions, activity limitations and participation restrictions that may impact the plan of care [x] LOW: addressing 1-2 elements  [] MODERATE: 3+ elements  [] HIGH: 4+ elements (please support below)    Moderate / High Support Documentation:      Clinical Presentation [x] LOW: stable  [] MODERATE: Evolving  [] HIGH: Unstable     Decision Making/ Complexity Score: low       Goals:    Long Term Goals: (8 weeks)  Patient will be compliant with HEP to promote the independent management of current diagnosis.  Patient will resume normal golf and jogging routine with no limitations due to left foot discomfort.  Patient will improve FOTO status from 57% to 71% indicating increased functional mobility.      PLAN   Plan of care Certification: 7/31/2024 to 9/30/2024.    Outpatient Physical Therapy 1- 2 times weekly for 8 weeks to include the following interventions: Manual Therapy, Moist Heat/ Ice, Neuromuscular Re-ed, Patient Education, Therapeutic Activities, Therapeutic Exercise, Ultrasound, and IASTM, vacuum cupping, dry needling, and kinesiotaping .     Michael Harris, PT      I CERTIFY THE NEED FOR THESE SERVICES FURNISHED UNDER THIS PLAN OF TREATMENT AND  WHILE UNDER MY CARE   Physician's comments:     Physician's Signature: ___________________________________________________

## 2024-08-02 ENCOUNTER — OFFICE VISIT (OUTPATIENT)
Dept: INTERNAL MEDICINE | Facility: CLINIC | Age: 49
End: 2024-08-02
Payer: COMMERCIAL

## 2024-08-02 VITALS
BODY MASS INDEX: 24.32 KG/M2 | DIASTOLIC BLOOD PRESSURE: 68 MMHG | HEIGHT: 77 IN | SYSTOLIC BLOOD PRESSURE: 130 MMHG | WEIGHT: 205.94 LBS | OXYGEN SATURATION: 98 % | HEART RATE: 76 BPM

## 2024-08-02 DIAGNOSIS — Z00.00 ENCOUNTER FOR ANNUAL PHYSICAL EXAM: Primary | ICD-10-CM

## 2024-08-02 DIAGNOSIS — G57.62 MORTON NEUROMA, LEFT: ICD-10-CM

## 2024-08-02 PROCEDURE — 99999 PR PBB SHADOW E&M-EST. PATIENT-LVL IV: CPT | Mod: PBBFAC,,, | Performed by: NURSE PRACTITIONER

## 2024-08-02 RX ORDER — GABAPENTIN 300 MG/1
300 CAPSULE ORAL 2 TIMES DAILY PRN
Qty: 180 CAPSULE | Refills: 3 | Status: SHIPPED | OUTPATIENT
Start: 2024-08-02

## 2024-08-02 NOTE — PROGRESS NOTES
"Subjective     Patient ID: Darek Mcnair is a 49 y.o. male.  /68 (BP Location: Right arm, Patient Position: Sitting)   Pulse 76   Ht 6' 5" (1.956 m)   Wt 93.4 kg (205 lb 14.6 oz)   SpO2 98%   BMI 24.42 kg/m²      Chief Complaint: Annual.    Mr Mcnair is a 48yo male presenting for his annual and with concern for L foot pain. Medical history of low back pain and insomnia. Currently being worked up for Florian's neuroma by othro; has upcomming EMG. Patient has not responded to injections and his foot pain has kept him from playing golf. He also drives stick shift and has even considered getting an automatic car due to the foot pain. He describes the pain as numb and tingling. Of note, he started PT this week for his foot pain. He has history of tobacco smoking x 5 years in his 20's; <1PPD. He drinks alcohol seldomly. Besides the foot pain, he feels well today.       Patient Active Problem List   Diagnosis    Strain of muscle(s) and tendon(s) of peroneal muscle group at lower leg level, right leg, initial encounter    Choroid neovascularization, left    Poor posture    Decreased strength of trunk and back    Left foot pain        Current Outpatient Medications   Medication Sig Dispense Refill    multivitamin (THERAGRAN) per tablet Take 1 tablet by mouth once daily.      omega-3 fatty acids/fish oil (FISH OIL-OMEGA-3 FATTY ACIDS) 300-1,000 mg capsule Take by mouth once daily.      zolpidem (AMBIEN) 10 mg Tab Take 10 mg by mouth every evening.      gabapentin (NEURONTIN) 300 MG capsule Take 1 capsule (300 mg total) by mouth 2 (two) times daily as needed (Nerve pain). 180 capsule 3     Current Facility-Administered Medications   Medication Dose Route Frequency Provider Last Rate Last Admin    fluorescein 500 mg/5 mL (10 %) injection 500 mg  5 mL Intravenous Once COLLEEN Garcia MD            Review of Systems   Constitutional:  Positive for activity change. Negative for unexpected weight change. "   HENT:  Negative for hearing loss, rhinorrhea and trouble swallowing.    Eyes:  Negative for discharge and visual disturbance.   Respiratory:  Negative for chest tightness and wheezing.    Cardiovascular:  Negative for chest pain and palpitations.   Gastrointestinal:  Negative for blood in stool, constipation, diarrhea and vomiting.   Endocrine: Negative for polydipsia and polyuria.   Genitourinary:  Negative for difficulty urinating, hematuria and urgency.   Musculoskeletal:  Positive for arthralgias. Negative for joint swelling and neck pain.   Neurological:  Negative for weakness and headaches.   Psychiatric/Behavioral:  Negative for confusion and dysphoric mood.    All other systems reviewed and are negative.         Objective     Physical Exam  Constitutional:       General: He is not in acute distress.     Appearance: Normal appearance. He is not ill-appearing, toxic-appearing or diaphoretic.   HENT:      Head: Normocephalic and atraumatic.      Right Ear: Tympanic membrane, ear canal and external ear normal.      Left Ear: Tympanic membrane, ear canal and external ear normal.      Nose: Nose normal.      Mouth/Throat:      Mouth: Mucous membranes are moist.      Pharynx: Oropharynx is clear.   Eyes:      Pupils: Pupils are equal, round, and reactive to light.   Cardiovascular:      Rate and Rhythm: Normal rate and regular rhythm.      Heart sounds: Normal heart sounds.   Pulmonary:      Effort: Pulmonary effort is normal.      Breath sounds: Normal breath sounds.   Abdominal:      General: Abdomen is flat.      Palpations: Abdomen is soft.   Musculoskeletal:         General: Tenderness (Top of L foot, between 2nd and 3rd toe) present. Normal range of motion.      Cervical back: Normal range of motion and neck supple.   Skin:     General: Skin is warm and dry.   Neurological:      General: No focal deficit present.      Mental Status: He is alert and oriented to person, place, and time.   Psychiatric:          Mood and Affect: Mood normal.         Behavior: Behavior normal.        Assessment and Plan     1. Encounter for annual physical exam; due for routine screenings and health maintenance, ordered. Due for next cologaurd next year; negative cologaurd 2 years ago. Does not want COVID vaccination   -     HEMOGLOBIN A1C; Future; Expected date: 08/02/2024  -     LIPID PANEL; Future; Expected date: 08/02/2024  -     COMPREHENSIVE METABOLIC PANEL; Future; Expected date: 08/02/2024    2. Florian neuroma, left; ongoing problem since May of this year, not improved. Will treat neuropathic pain with gabapentin. Continue follow up with Ortho and PT   -     gabapentin (NEURONTIN) 300 MG capsule; Take 1 capsule (300 mg total) by mouth 2 (two) times daily as needed (Nerve pain).  Dispense: 180 capsule; Refill: 3          Charles Gloria NP   Internal Medicine           Follow up if symptoms worsen or fail to improve.

## 2024-08-08 ENCOUNTER — CLINICAL SUPPORT (OUTPATIENT)
Dept: REHABILITATION | Facility: HOSPITAL | Age: 49
End: 2024-08-08
Payer: COMMERCIAL

## 2024-08-08 ENCOUNTER — LAB VISIT (OUTPATIENT)
Dept: LAB | Facility: HOSPITAL | Age: 49
End: 2024-08-08
Payer: COMMERCIAL

## 2024-08-08 DIAGNOSIS — M79.672 LEFT FOOT PAIN: Primary | ICD-10-CM

## 2024-08-08 DIAGNOSIS — Z00.00 ENCOUNTER FOR ANNUAL PHYSICAL EXAM: ICD-10-CM

## 2024-08-08 LAB
ALBUMIN SERPL BCP-MCNC: 4.2 G/DL (ref 3.5–5.2)
ALP SERPL-CCNC: 75 U/L (ref 55–135)
ALT SERPL W/O P-5'-P-CCNC: 18 U/L (ref 10–44)
ANION GAP SERPL CALC-SCNC: 8 MMOL/L (ref 8–16)
AST SERPL-CCNC: 16 U/L (ref 10–40)
BILIRUB SERPL-MCNC: 0.8 MG/DL (ref 0.1–1)
BUN SERPL-MCNC: 12 MG/DL (ref 6–20)
CALCIUM SERPL-MCNC: 9.8 MG/DL (ref 8.7–10.5)
CHLORIDE SERPL-SCNC: 104 MMOL/L (ref 95–110)
CHOLEST SERPL-MCNC: 183 MG/DL (ref 120–199)
CHOLEST/HDLC SERPL: 4.3 {RATIO} (ref 2–5)
CO2 SERPL-SCNC: 28 MMOL/L (ref 23–29)
CREAT SERPL-MCNC: 1 MG/DL (ref 0.5–1.4)
EST. GFR  (NO RACE VARIABLE): >60 ML/MIN/1.73 M^2
ESTIMATED AVG GLUCOSE: 105 MG/DL (ref 68–131)
GLUCOSE SERPL-MCNC: 90 MG/DL (ref 70–110)
HBA1C MFR BLD: 5.3 % (ref 4–5.6)
HDLC SERPL-MCNC: 43 MG/DL (ref 40–75)
HDLC SERPL: 23.5 % (ref 20–50)
LDLC SERPL CALC-MCNC: 121.8 MG/DL (ref 63–159)
NONHDLC SERPL-MCNC: 140 MG/DL
POTASSIUM SERPL-SCNC: 4.1 MMOL/L (ref 3.5–5.1)
PROT SERPL-MCNC: 7.4 G/DL (ref 6–8.4)
SODIUM SERPL-SCNC: 140 MMOL/L (ref 136–145)
TRIGL SERPL-MCNC: 91 MG/DL (ref 30–150)

## 2024-08-08 PROCEDURE — 97140 MANUAL THERAPY 1/> REGIONS: CPT | Mod: PN

## 2024-08-08 PROCEDURE — 80053 COMPREHEN METABOLIC PANEL: CPT | Performed by: NURSE PRACTITIONER

## 2024-08-08 PROCEDURE — 36415 COLL VENOUS BLD VENIPUNCTURE: CPT | Performed by: NURSE PRACTITIONER

## 2024-08-08 PROCEDURE — 83036 HEMOGLOBIN GLYCOSYLATED A1C: CPT | Performed by: NURSE PRACTITIONER

## 2024-08-08 PROCEDURE — 97014 ELECTRIC STIMULATION THERAPY: CPT | Mod: PN

## 2024-08-08 PROCEDURE — 80061 LIPID PANEL: CPT | Performed by: NURSE PRACTITIONER

## 2024-08-15 ENCOUNTER — CLINICAL SUPPORT (OUTPATIENT)
Dept: REHABILITATION | Facility: HOSPITAL | Age: 49
End: 2024-08-15
Payer: COMMERCIAL

## 2024-08-15 DIAGNOSIS — M79.672 LEFT FOOT PAIN: Primary | ICD-10-CM

## 2024-08-15 PROCEDURE — 97140 MANUAL THERAPY 1/> REGIONS: CPT | Mod: PN

## 2024-08-15 PROCEDURE — 97110 THERAPEUTIC EXERCISES: CPT | Mod: PN

## 2024-08-15 NOTE — PROGRESS NOTES
"[facility]   Physical Therapy Treatment Note     Name: Darek Bolton [facility] Number: [id number]    Therapy Diagnosis:   Encounter Diagnosis   Name Primary?    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  He reports symptoms return after removing tape.  He used his TENS unit daily for about 4-5 days and did not see any improvement.  Reports continued performance of HEP.  He was compliant with home exercise program.  Response to previous treatment: no increased pain  Functional change: none at this time    Pain: 0-5/10, depending on what he is doing  Location:  left foot     OBJECTIVE     Objective Measures updated at progress report unless specified.       TREATMENT       AZUCENA received the treatments listed below:       therapeutic exercises to develop strength, endurance, ROM, flexibility, and core stabilization for (23)  minutes including:  - gastroc stretch 10 x 10" on incline board  - soleus stretch 10 x 10" on incline board  - 4 way ankle 3 x 10, green band  - supine hamstring stretch with strap 3 x 20"   - supine sciatic nerve flossing 1 x 10     manual therapy techniques applied for () minutes, including:  - Pt received Manual Therapy techniques in the form of Dry Needling for 25 min. This was applied to " the following homeostatic neuro-tirgger points of left LE: saphenous, tibial, sural as well as at bifurcation of tibial nerve. Dry needling was performed to decrease inflammation, increase circulation, decrease pain and restore homeostasis.      30 mm needles with 0.25 gauge were used. Electrical stimulation was applied to the needles at tibial nerve points.    Patient gave written consent to undergo dry needling. Written consent can be found in the media section in pts chart. All needles were removed and changes in signs and symptoms were assessed. No adverse reactions noted at the conclusion of the treatment.     neuromuscular re-education activities to improve: Balance, Coordination, Kinesthetic, Sense, Proprioception, and Posture for (0)  minutes., including:      dynamic functional therapeutic activities to improve functional performance for (0)  minutes, including:           PATIENT EDUCATION AND HOME EXERCISES     Home Exercises Provided and Patient Education Provided     Education/Self-Care provided:  Review of HEP    Written Home Exercises Provided: Patient instructed to cont prior HEP. Exercises were reviewed and AZUCENA was able to demonstrate them prior to the end of the session.  AZUCENA demonstrated good  understanding of the education provided. See EMR under Patient Instructions for exercises provided during therapy sessions    ASSESSMENT     AZUCENA continues to report no significant change in his symptoms with only relief being while his great toe and second toe was taped together.  Patient is progressed to light therex on this date and received dry needling along left LE homeostatic neruo-trigger points in attempt to decrease pain.  Will assess patient response to today's treatment and adjust treatment accordingly.      AZUCENA Is progressing well towards his goals.   Pt prognosis is Good.     Pt will continue to benefit from skilled outpatient physical therapy to address the deficits listed in the problem list box on  initial evaluation, provide pt/family education and to maximize pt's level of independence in the home and community environment.     Pt's spiritual, cultural and educational needs considered and pt agreeable to plan of care and goals.     Anticipated barriers to physical therapy: none    Goals:   Long Term Goals: (8 weeks)  Patient will be compliant with HEP to promote the independent management of current diagnosis. In Progress, Not Met  Patient will resume normal golf and jogging routine with no limitations due to left foot discomfort. In Progress, Not Met  Patient will improve FOTO status from 57% to 71% indicating increased functional mobility. In Progress, Not Met    PLAN     Continue with PT POC and progress as tolerated.     Michael Harris, PT

## 2024-08-22 ENCOUNTER — CLINICAL SUPPORT (OUTPATIENT)
Dept: REHABILITATION | Facility: HOSPITAL | Age: 49
End: 2024-08-22
Payer: COMMERCIAL

## 2024-08-22 DIAGNOSIS — M79.672 LEFT FOOT PAIN: Primary | ICD-10-CM

## 2024-08-22 PROCEDURE — 97140 MANUAL THERAPY 1/> REGIONS: CPT | Mod: PN

## 2024-08-22 PROCEDURE — 97035 APP MDLTY 1+ULTRASOUND EA 15: CPT | Mod: PN

## 2024-08-22 PROCEDURE — 97110 THERAPEUTIC EXERCISES: CPT | Mod: PN

## 2024-08-22 NOTE — PROGRESS NOTES
"[facility]   Physical Therapy Treatment Note     Name: Darek Bolton [facility] Number: [id number]    Therapy Diagnosis:   Encounter Diagnosis   Name Primary?    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  Patient reports the taping of toes only helped the first time or two he did it.  He is going to contact MD and request a follow up.  He was compliant with home exercise program.  Response to previous treatment: no increased pain  Functional change: none at this time    Pain: 0-5/10, depending on what he is doing  Location:  left foot     OBJECTIVE     Objective Measures updated at progress report unless specified.       TREATMENT       AZUCENA received the treatments listed below:       therapeutic exercises to develop strength, endurance, ROM, flexibility, and core stabilization for (23)  minutes including:  - gastroc stretch 10 x 10" on incline board  - soleus stretch 10 x 10" on incline board  - 4 way ankle 3 x 10, green band  - supine hamstring stretch with strap 3 x 20"   - supine sciatic nerve flossing 1 x 10     manual therapy techniques applied for (15) minutes, including:  - STM to gastroc, soleus, flexor digitorum, posterior tibialis  - IASTM and vacuum cupping to gastroc, soleus,   -   neuromuscular re-education activities to improve: Balance, Coordination, Kinesthetic, " Sense, Proprioception, and Posture for (0)  minutes., including:      dynamic functional therapeutic activities to improve functional performance for (0)  minutes, including:        -ultrasound x 8 minutes at 1.5 MHz and 20% pulsed       PATIENT EDUCATION AND HOME EXERCISES     Home Exercises Provided and Patient Education Provided     Education/Self-Care provided:  Review of HEP    Written Home Exercises Provided: Patient instructed to cont prior HEP. Exercises were reviewed and AZUCENA was able to demonstrate them prior to the end of the session.  AZUCENA demonstrated good  understanding of the education provided. See EMR under Patient Instructions for exercises provided during therapy sessions    ASSESSMENT     AZUCENA has not responded to physical therapy treatment with no significant change in symptoms reported.  He received manual therapy, IASTM, vacuum cupping, and ultrasound treatment on this date.  Patient will follow up with MD for further medical evaluation due to lack of progress.        AZUCENA Is progressing well towards his goals.   Pt prognosis is Good.     Pt will continue to benefit from skilled outpatient physical therapy to address the deficits listed in the problem list box on initial evaluation, provide pt/family education and to maximize pt's level of independence in the home and community environment.     Pt's spiritual, cultural and educational needs considered and pt agreeable to plan of care and goals.     Anticipated barriers to physical therapy: none    Goals:   Long Term Goals: (8 weeks)  Patient will be compliant with HEP to promote the independent management of current diagnosis. In Progress, Not Met  Patient will resume normal golf and jogging routine with no limitations due to left foot discomfort. In Progress, Not Met  Patient will improve FOTO status from 57% to 71% indicating increased functional mobility. In Progress, Not Met    PLAN     Continue with PT POC and progress as tolerated.       Steven, TRUPTI

## 2024-08-23 ENCOUNTER — PATIENT MESSAGE (OUTPATIENT)
Dept: ORTHOPEDICS | Facility: CLINIC | Age: 49
End: 2024-08-23
Payer: COMMERCIAL

## 2024-09-03 ENCOUNTER — OFFICE VISIT (OUTPATIENT)
Dept: ORTHOPEDICS | Facility: CLINIC | Age: 49
End: 2024-09-03
Payer: COMMERCIAL

## 2024-09-03 VITALS — WEIGHT: 205.94 LBS | BODY MASS INDEX: 24.32 KG/M2 | HEIGHT: 77 IN

## 2024-09-03 DIAGNOSIS — G57.62 MORTON'S NEUROMA OF LEFT FOOT: Primary | ICD-10-CM

## 2024-09-03 PROCEDURE — 1159F MED LIST DOCD IN RCRD: CPT | Mod: CPTII,S$GLB,, | Performed by: ORTHOPAEDIC SURGERY

## 2024-09-03 PROCEDURE — 64455 NJX AA&/STRD PLTR COM DG NRV: CPT | Mod: LT,S$GLB,, | Performed by: ORTHOPAEDIC SURGERY

## 2024-09-03 PROCEDURE — 1160F RVW MEDS BY RX/DR IN RCRD: CPT | Mod: CPTII,S$GLB,, | Performed by: ORTHOPAEDIC SURGERY

## 2024-09-03 PROCEDURE — 99999 PR PBB SHADOW E&M-EST. PATIENT-LVL III: CPT | Mod: PBBFAC,,, | Performed by: ORTHOPAEDIC SURGERY

## 2024-09-03 PROCEDURE — 3008F BODY MASS INDEX DOCD: CPT | Mod: CPTII,S$GLB,, | Performed by: ORTHOPAEDIC SURGERY

## 2024-09-03 PROCEDURE — 99213 OFFICE O/P EST LOW 20 MIN: CPT | Mod: 25,S$GLB,, | Performed by: ORTHOPAEDIC SURGERY

## 2024-09-03 PROCEDURE — 3044F HG A1C LEVEL LT 7.0%: CPT | Mod: CPTII,S$GLB,, | Performed by: ORTHOPAEDIC SURGERY

## 2024-09-03 RX ORDER — METHYLPREDNISOLONE ACETATE 40 MG/ML
40 INJECTION, SUSPENSION INTRA-ARTICULAR; INTRALESIONAL; INTRAMUSCULAR; SOFT TISSUE
Status: COMPLETED | OUTPATIENT
Start: 2024-09-03 | End: 2024-09-03

## 2024-09-03 RX ADMIN — METHYLPREDNISOLONE ACETATE 40 MG: 40 INJECTION, SUSPENSION INTRA-ARTICULAR; INTRALESIONAL; INTRAMUSCULAR; SOFT TISSUE at 02:09

## 2024-09-03 NOTE — PROGRESS NOTES
Darek Mcnair  Returns today for follow-up.  This is a 49-year-old male who presented to me on 07/26/2024 for pain in the ball of his left foot which began primarily around the 2nd and 3rd toes and he reported a throbbing and numbing sensation in the 2nd toe.  He had been seen by other providers in the past including a podiatrist who recommended orthotics which did not give him any relief and then in June he had a steroid injection in the 2nd intermetatarsal space for a possible atypical neuroma by Dr. Moore which he states gave him some relief in the 1st few hours but no prolonged relief.  An MRI was performed on 07/16/2024 which did not reveal any obvious abnormalities in the forefoot.  His symptoms recurred and he was referred by Dr. Dela Cruz for another opinion.  He was noted to have pes cavus of both feet and findings that could be consistent with a neuroma of the 2nd space.  I performed a diagnostic injection of the 2nd space with lidocaine and he reports that he did not have any relief in the 1st couple of hours after the injection.  At that time I thought he might also have some tenderness in the 3rd space so I suggested he could come back in and we could try to inject the 3rd space.  However, he now feels the pain is in the 1st space and reports a sharp intermittent pain at times especially when he squeezes his metatarsals together.  He inquires whether or not I could perform an injection of the 1st space.  He states he has been going to therapy but has had no relief from therapy.  He is also scheduled for EMG nerve conduction studies of the left foot but it is not scheduled until 10/20/2024.  He also reports he has been having some sensations in his right foot which were similar to when he started having the symptoms in his left foot.    Examination:  He has continued mild pes cavus bilaterally.  There is no swelling or erythema noted about the left foot or the right foot.  On sitting exam he has full  motion of his ankle and subtalar joints bilaterally.  He has good function and strength of all the tendons about his ankle.  He has normal sensation in the plantar aspect of his feet and he has good distal pulses.  There is no pain on range of motion of his toes.  On the left side he has tenderness in the 1st intermetatarsal space.  There is no significant tenderness in the 3rd intermetatarsal space or in the 2nd intermetatarsal space at this time.  There is no tenderness about the right foot.  He has good distal pulses.    Impression:  1. Possible neuroma of left foot, 1st space  methylPREDNISolone acetate injection 40 mg        Recommendation:  I explained to him that it is not common to have a neuroma in the 1st space but we could go ahead and do a diagnostic/therapeutic injection.  After verbal consent and sterile prep I injected the 1st intermetatarsal space with 2 cc lidocaine and 40 mg of methylprednisolone.  We will see how he responds to this injection.  He is also waiting the EMG nerve conduction studies and we will notify him of those results.

## 2024-10-24 ENCOUNTER — PROCEDURE VISIT (OUTPATIENT)
Dept: NEUROLOGY | Facility: CLINIC | Age: 49
End: 2024-10-24

## 2024-10-24 DIAGNOSIS — Q66.71 PES CAVUS OF BOTH FEET: ICD-10-CM

## 2024-10-24 DIAGNOSIS — Q66.72 PES CAVUS OF BOTH FEET: ICD-10-CM

## 2024-10-24 DIAGNOSIS — G57.62 MORTON'S NEUROMA OF SECOND INTERSPACE OF LEFT FOOT: ICD-10-CM

## 2024-10-24 PROCEDURE — 95886 MUSC TEST DONE W/N TEST COMP: CPT | Mod: 26,S$PBB,, | Performed by: PSYCHIATRY & NEUROLOGY

## 2024-10-24 PROCEDURE — 95909 NRV CNDJ TST 5-6 STUDIES: CPT | Mod: PBBFAC | Performed by: PSYCHIATRY & NEUROLOGY

## 2024-10-24 PROCEDURE — 95886 MUSC TEST DONE W/N TEST COMP: CPT | Mod: PBBFAC | Performed by: PSYCHIATRY & NEUROLOGY

## 2024-10-24 PROCEDURE — 99203 OFFICE O/P NEW LOW 30 MIN: CPT | Mod: S$PBB,,, | Performed by: PSYCHIATRY & NEUROLOGY

## 2024-10-24 PROCEDURE — 95909 NRV CNDJ TST 5-6 STUDIES: CPT | Mod: 26,S$PBB,, | Performed by: PSYCHIATRY & NEUROLOGY

## 2024-10-24 NOTE — PROCEDURES
EMG W/ ULTRASOUND AND NERVE CONDUCTION TEST 1 Extremity    Date/Time: 10/24/2024 3:00 PM    Performed by: Michael Coppola MD  Authorized by: David Caceres MD                                                                [facility] [address] Neurology    Subjective:       Patient ID: Darek Mcnair is a 49 y.o. male here for a EMG focused evaluation for foot pain. Previous visits and diagnostic evaluation has been reviewed.  Patient states in May of this year he began experiencing progressive left foot pain.  He describes the pain as a tightness and numbness type sensation.  Pain is worse with weight-bearing.  Pain primarily involves the distal foot region.  Symptoms have been progressively worsening and severe at times.   HPI  Review of patient's allergies indicates:  No Known Allergies   There were no vitals filed for this visit.   Chief Complaint: No chief complaint on file.    Past Medical History:   Diagnosis Date    Squamous cell carcinoma of skin       Social History     Socioeconomic History    Marital status:    Tobacco Use    Smoking status: Former     Types: Cigarettes    Smokeless tobacco: Never    Tobacco comments:     5 cigs/day for 5 years   Substance and Sexual Activity    Alcohol use: Yes     Alcohol/week: 2.0 standard drinks of alcohol     Types: 2 Cans of beer per week     Comment: social    Drug use: No    Sexual activity: Yes     Social Drivers of Health     Financial Resource Strain: Patient Declined (11/27/2023)    Overall Financial Resource Strain (CARDIA)     Difficulty of Paying Living Expenses: Patient declined   Food Insecurity: Patient Declined (11/27/2023)    Hunger Vital Sign     Worried About Running Out of Food in the Last Year: Patient declined     Ran Out of Food in the Last Year: Patient declined   Transportation Needs: No Transportation Needs (11/27/2023)    PRAPARE - Transportation     Lack of Transportation (Medical): No     Lack of  Transportation (Non-Medical): No   Physical Activity: Unknown (11/27/2023)    Exercise Vital Sign     Days of Exercise per Week: 1 day   Stress: Stress Concern Present (11/27/2023)    [employer] [place] [employer] - Occupational Stress Questionnaire     Feeling of Stress : To some extent   Housing Stability: Unknown (11/27/2023)    Housing Stability Vital Sign     Unable to Pay for Housing in the Last Year: Patient refused     Unstable Housing in the Last Year: Patient refused            Objective:      Physical Exam    Constitutional: Patient appears well-nourished.   Head: Normocephalic and atraumatic.   Mouth/Throat: Oropharynx is clear and moist.   Pulmonary/Chest: Effort normal.   Abdominal: Soft.   Skin: Skin is warm and dry.      General:  Patient is alert and cooperative.  Affect:  Patient is appropriate to surroundings and environment.  Language:  Speech is fluent.  HEENT:  There are no outward signs of trauma to head or face.  Cranial Nerves:  Pupils are equal round and reactive to light. Extra-ocular movements are intact. Face, tongue, and palate are  symmetrical.  Motor:  Patient exhibits normal strength testing in bilateral proximal and distal lower extremities.  Reflexes:  Symmetrical in bilateral lower extremities.  Gait:  Ambulation is independent without use of cane or walker without signs of ataxia or circumduction.  Cerebellar:  No evidence of dysmetria.  Sensory:  Intact to sensory modalities tested.  Musculoskeletal:  There is no severe tenderness to palpation and manipulation of lumbar spine region.   Assessment:       We reviewed and discussed at length results of EMG of the left lower extremity performed today which was normal. These findings are available via media section of chart review.   1. 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 Pes cavus of both feet              Plan:       We discussed treatment options at length. Recommend patient keep appointment with  referring provider.         I spent a total of 35 minutes on the day of the visit. This includes face to face time and non-face to face time preparing to see the patient (eg, review of tests), obtaining and/or reviewing separately obtained history, documenting clinical information in the electronic or other health record, independently interpreting results and communicating results to the patient/family/caregiver, or care coordinator.    Michael Coppola MD, FAAN   10/24/2024   3:29 PM       A dictation device was used to produce this document. Use of such devices sometimes results in grammatical errors or replacement of words that sound similarly.

## 2025-05-22 ENCOUNTER — PATIENT MESSAGE (OUTPATIENT)
Dept: DERMATOLOGY | Facility: CLINIC | Age: 50
End: 2025-05-22

## 2025-06-09 ENCOUNTER — PATIENT MESSAGE (OUTPATIENT)
Dept: DERMATOLOGY | Facility: CLINIC | Age: 50
End: 2025-06-09
Payer: COMMERCIAL

## 2025-07-31 ENCOUNTER — OFFICE VISIT (OUTPATIENT)
Dept: INTERNAL MEDICINE | Facility: CLINIC | Age: 50
End: 2025-07-31
Payer: COMMERCIAL

## 2025-07-31 ENCOUNTER — LAB VISIT (OUTPATIENT)
Dept: LAB | Facility: HOSPITAL | Age: 50
End: 2025-07-31
Payer: COMMERCIAL

## 2025-07-31 VITALS
WEIGHT: 230.63 LBS | DIASTOLIC BLOOD PRESSURE: 82 MMHG | HEART RATE: 79 BPM | BODY MASS INDEX: 27.23 KG/M2 | SYSTOLIC BLOOD PRESSURE: 126 MMHG | OXYGEN SATURATION: 99 % | HEIGHT: 77 IN

## 2025-07-31 DIAGNOSIS — Z23 IMMUNIZATION DUE: ICD-10-CM

## 2025-07-31 DIAGNOSIS — Z85.828 HISTORY OF SKIN CANCER: ICD-10-CM

## 2025-07-31 DIAGNOSIS — Z00.00 ENCOUNTER FOR ANNUAL PHYSICAL EXAM: ICD-10-CM

## 2025-07-31 DIAGNOSIS — Z12.11 SCREENING FOR COLON CANCER: ICD-10-CM

## 2025-07-31 DIAGNOSIS — Z00.00 ENCOUNTER FOR ANNUAL PHYSICAL EXAM: Primary | ICD-10-CM

## 2025-07-31 LAB
ABSOLUTE EOSINOPHIL (OHS): 0.1 K/UL
ABSOLUTE MONOCYTE (OHS): 0.72 K/UL (ref 0.3–1)
ABSOLUTE NEUTROPHIL COUNT (OHS): 4.77 K/UL (ref 1.8–7.7)
ALBUMIN SERPL BCP-MCNC: 4.7 G/DL (ref 3.5–5.2)
ALP SERPL-CCNC: 84 UNIT/L (ref 40–150)
ALT SERPL W/O P-5'-P-CCNC: 72 UNIT/L (ref 0–55)
ANION GAP (OHS): 8 MMOL/L (ref 8–16)
AST SERPL-CCNC: 45 UNIT/L (ref 0–50)
BASOPHILS # BLD AUTO: 0.04 K/UL
BASOPHILS NFR BLD AUTO: 0.5 %
BILIRUB SERPL-MCNC: 0.7 MG/DL (ref 0.1–1)
BUN SERPL-MCNC: 14 MG/DL (ref 6–20)
CALCIUM SERPL-MCNC: 9.4 MG/DL (ref 8.7–10.5)
CHLORIDE SERPL-SCNC: 106 MMOL/L (ref 95–110)
CHOLEST SERPL-MCNC: 241 MG/DL (ref 120–199)
CHOLEST/HDLC SERPL: 5.2 {RATIO} (ref 2–5)
CO2 SERPL-SCNC: 26 MMOL/L (ref 23–29)
CREAT SERPL-MCNC: 1.1 MG/DL (ref 0.5–1.4)
EAG (OHS): 103 MG/DL (ref 68–131)
ERYTHROCYTE [DISTWIDTH] IN BLOOD BY AUTOMATED COUNT: 12.1 % (ref 11.5–14.5)
GFR SERPLBLD CREATININE-BSD FMLA CKD-EPI: >60 ML/MIN/1.73/M2
GLUCOSE SERPL-MCNC: 95 MG/DL (ref 70–110)
HBA1C MFR BLD: 5.2 % (ref 4–5.6)
HCT VFR BLD AUTO: 49.4 % (ref 40–54)
HDLC SERPL-MCNC: 46 MG/DL (ref 40–75)
HDLC SERPL: 19.1 % (ref 20–50)
HGB BLD-MCNC: 16.2 GM/DL (ref 14–18)
IMM GRANULOCYTES # BLD AUTO: 0.03 K/UL (ref 0–0.04)
IMM GRANULOCYTES NFR BLD AUTO: 0.4 % (ref 0–0.5)
LDLC SERPL CALC-MCNC: 169 MG/DL (ref 63–159)
LYMPHOCYTES # BLD AUTO: 1.74 K/UL (ref 1–4.8)
MCH RBC QN AUTO: 29.8 PG (ref 27–31)
MCHC RBC AUTO-ENTMCNC: 32.8 G/DL (ref 32–36)
MCV RBC AUTO: 91 FL (ref 82–98)
NONHDLC SERPL-MCNC: 195 MG/DL
NUCLEATED RBC (/100WBC) (OHS): 0 /100 WBC
PLATELET # BLD AUTO: 296 K/UL (ref 150–450)
PMV BLD AUTO: 10 FL (ref 9.2–12.9)
POTASSIUM SERPL-SCNC: 4.3 MMOL/L (ref 3.5–5.1)
PROT SERPL-MCNC: 8.1 GM/DL (ref 6–8.4)
RBC # BLD AUTO: 5.44 M/UL (ref 4.6–6.2)
RELATIVE EOSINOPHIL (OHS): 1.4 %
RELATIVE LYMPHOCYTE (OHS): 23.5 % (ref 18–48)
RELATIVE MONOCYTE (OHS): 9.7 % (ref 4–15)
RELATIVE NEUTROPHIL (OHS): 64.5 % (ref 38–73)
SODIUM SERPL-SCNC: 140 MMOL/L (ref 136–145)
TRIGL SERPL-MCNC: 130 MG/DL (ref 30–150)
WBC # BLD AUTO: 7.4 K/UL (ref 3.9–12.7)

## 2025-07-31 PROCEDURE — 82040 ASSAY OF SERUM ALBUMIN: CPT

## 2025-07-31 PROCEDURE — 85025 COMPLETE CBC W/AUTO DIFF WBC: CPT

## 2025-07-31 PROCEDURE — 83036 HEMOGLOBIN GLYCOSYLATED A1C: CPT

## 2025-07-31 PROCEDURE — 80061 LIPID PANEL: CPT

## 2025-07-31 PROCEDURE — 36415 COLL VENOUS BLD VENIPUNCTURE: CPT

## 2025-07-31 PROCEDURE — 99999 PR PBB SHADOW E&M-EST. PATIENT-LVL III: CPT | Mod: PBBFAC,,, | Performed by: NURSE PRACTITIONER

## 2025-07-31 NOTE — PROGRESS NOTES
Two pt identifier verified. Pt tolerated well. Advised pt to wait 15 minutes post immunization. Pt verbalized understanding.    Nirav MACIAS

## 2025-07-31 NOTE — PROGRESS NOTES
"Subjective     Patient ID: Darek Mcnair is a 50 y.o. male.  /82 (BP Location: Left arm, Patient Position: Sitting)   Pulse 79   Ht 6' 5" (1.956 m)   Wt 104.6 kg (230 lb 9.6 oz)   SpO2 99%   BMI 27.35 kg/m²      History of Present Illness    CHIEF COMPLAINT:  Darek presents today for annual follow-up.    FOOT DISCOMFORT:  He reports ongoing foot discomfort involving a bent hammer toe that is extremely uncomfortable but not painful. Discomfort is most noticeable during activities like golf, where he experiences tightness and a pulling sensation, particularly after extended use. He has previously consulted foot specialists, received injections, and underwent an MRI, which revealed no structural abnormalities. He has also completed physical therapy, though the underlying issue remains unresolved. He notes some days are worse than others and acknowledges the condition impacts his exercise routine. He speculates the foot issue may be a manifestation of unconscious stress. Currently, the toe appears bent and asymmetrical compared to his other toes, with tightness that becomes more pronounced after physical activity.    HEAT INTOLERANCE:  He reports increased heat intolerance over the past summer with excessive sweating and fatigue when working in his garage for approximately 20 minutes, particularly in high temperatures with a heat index of 115 degrees. He feels more tired and sweats more profusely than usual, attributing some symptoms to aging and turning 50 years old. He experiences significant discomfort even during evening outdoor activities when temperatures remain around 84-85 degrees. He expresses concern about whether his heat sensitivity is abnormal or related to the unusually high summer temperatures.    EXERCISE AND ACTIVITY:  He reports reduced exercise due to foot condition. He currently plays golf and performs outdoor work, but no longer runs several miles per week as previously done. He " acknowledges weight gain directly attributed to decreased physical activity. He used to run 1-2 times weekly but has significantly reduced this activity. He continues to maintain some level of movement through golf and outdoor activities, though not at prior intensity levels.    MEDICAL HISTORY:  He has a history squamous cell carcinoma in situ diagnosed approximately 3-4 years ago and currently undergoes yearly dermatology screenings. He reports historical left mid-back pain described as a tender area that persisted for approximately 2-3 years and was previously treated with therapy. He previously experienced a shoulder issue that has since resolved.     FAMILY HISTORY:  Significant for heart disease on maternal side with multiple male family members having early heart attacks, including uncles with premature cardiac events. His maternal grandfather  prior to his parents' marriage, likely from cardiac-related cause. One uncle  at a younger age, while other uncles passed from heart attacks in their 70s and 80s. He denies family history of diabetes.    SOCIAL HISTORY:  He is a former smoker who quit over 20 years ago and currently uses Zyn nicotine pouches with no desire to discontinue. He drinks alcohol a couple times per week. He is sexually active with his wife and denies illicit drug use.    MEDICATIONS:  He takes Ambien 10 mg nightly for sleep, daily multivitamin, fish oil every other day, and B12 gummy supplements.    ROS:  Constitutional: +fatigue  Musculoskeletal: +limb pain  Neurological: +difficulty falling asleep  Psychiatric: +racing thoughts        Problem List[1]     Current Medications[2]          Objective     Physical Exam  Constitutional:       General: He is not in acute distress.     Appearance: Normal appearance. He is obese. He is not ill-appearing, toxic-appearing or diaphoretic.   HENT:      Head: Normocephalic and atraumatic.      Right Ear: Tympanic membrane, ear canal and external  ear normal.      Left Ear: Tympanic membrane, ear canal and external ear normal.      Mouth/Throat:      Mouth: Mucous membranes are moist.      Pharynx: Oropharynx is clear.   Eyes:      Pupils: Pupils are equal, round, and reactive to light.   Cardiovascular:      Rate and Rhythm: Normal rate and regular rhythm.      Heart sounds: Normal heart sounds.   Pulmonary:      Effort: Pulmonary effort is normal.      Breath sounds: Normal breath sounds.   Abdominal:      General: Abdomen is flat.      Palpations: Abdomen is soft.   Musculoskeletal:      Cervical back: Neck supple.   Skin:     General: Skin is warm and dry.   Neurological:      Mental Status: He is alert and oriented to person, place, and time.   Psychiatric:         Mood and Affect: Mood normal.         Behavior: Behavior normal.          Assessment and Plan     1. Encounter for annual physical exam; routine labs and screening per USPSTF guidelines. Will follow-up with results when available.   -     Cologuard Screening (Multitarget Stool DNA); Future; Expected date: 07/31/2025  -     CBC W/ AUTO DIFFERENTIAL; Future; Expected date: 07/31/2025  -     Comprehensive Metabolic Panel; Future; Expected date: 07/31/2025  -     LIPID PANEL; Future; Expected date: 07/31/2025  -     HEMOGLOBIN A1C; Future; Expected date: 07/31/2025    2. Screening for colon cancer; negative for cancer in 2022. Offered c-scope for colon cancer screening. Patient opted for repeat cologuard. Will follow-up with results when available.  -     Cologuard Screening (Multitarget Stool DNA); Future; Expected date: 07/31/2025    3. History of skin cancer; History squamous cell carcinoma in situ diagnosed approximately 3-4 years ago and currently undergoes yearly dermatology screenings. Lesion was located at L temple and patient is s/p removal of cancerous lesion. Continue yearly skin checks and wear sunscreen / protective clothing while outside.     4. Immunization due; Due for routine  vaccinations. Pneumonia and 1/2 shingles vaccinations today   -     pneumoc 20-mi conj-dip cr(PF) (PREVNAR-20 (PF)) injection Syrg 0.5 mL          Charles Gloria NP   Internal Medicine           Follow up in 1 year (on 7/31/2026) for annual.    This note was generated with the assistance of ambient listening technology. Verbal consent was obtained by the patient and accompanying visitor(s) for the recording of patient appointment to facilitate this note. I attest to having reviewed and edited the generated note for accuracy, though some syntax or spelling errors may persist. Please contact the author of this note for any clarification.           [1]   Patient Active Problem List  Diagnosis    Strain of muscle(s) and tendon(s) of peroneal muscle group at lower leg level, right leg, initial encounter    Choroid neovascularization, left    Poor posture    Decreased strength of trunk and back    Left foot pain    History of skin cancer   [2]   Current Outpatient Medications   Medication Sig Dispense Refill    multivitamin (THERAGRAN) per tablet Take 1 tablet by mouth once daily.      omega-3 fatty acids/fish oil (FISH OIL-OMEGA-3 FATTY ACIDS) 300-1,000 mg capsule Take by mouth once daily.      zolpidem (AMBIEN) 10 mg Tab Take 10 mg by mouth every evening.      varicella-zoster gE-AS01B, PF, (SHINGRIX, PF,) 50 mcg/0.5 mL injection Inject 0.5 mLs into the muscle once. for 1 dose 1 each 0     No current facility-administered medications for this visit.

## 2025-08-01 ENCOUNTER — PATIENT MESSAGE (OUTPATIENT)
Dept: INTERNAL MEDICINE | Facility: CLINIC | Age: 50
End: 2025-08-01
Payer: COMMERCIAL

## 2025-08-01 ENCOUNTER — TELEPHONE (OUTPATIENT)
Dept: INTERNAL MEDICINE | Facility: CLINIC | Age: 50
End: 2025-08-01
Payer: COMMERCIAL

## 2025-08-01 DIAGNOSIS — E78.5 HYPERLIPIDEMIA, UNSPECIFIED HYPERLIPIDEMIA TYPE: Primary | ICD-10-CM

## 2025-08-01 RX ORDER — ATORVASTATIN CALCIUM 40 MG/1
40 TABLET, FILM COATED ORAL DAILY
Qty: 90 TABLET | Refills: 3 | Status: SHIPPED | OUTPATIENT
Start: 2025-08-01

## 2025-08-01 NOTE — TELEPHONE ENCOUNTER
Copied from CRM #[id number]. Topic: General Inquiry - Return Call  >> Aug 1, 2025  1:46 PM Orin wrote:  Patient is returning a phone call.    Who left a message for the patient: Nirav Gustafson,    Does patient know what this is regarding:Not sure      Would you like a call back, or a response through your [facility] portal?:   Callback     Best call back number:     Comments:

## 2025-08-01 NOTE — TELEPHONE ENCOUNTER
Left pt a voicemail instructing her to contact the office so that she can be assisted with scheduling an appt.    Nirav MACIAS

## 2025-08-01 NOTE — TELEPHONE ENCOUNTER
Spoke with pt, informed him of new rx regarding cholesterol & Charles's recommendations. He verbalized understanding. Pt scheduled for fasting labs in 3 months.    Nirav MACIAS

## 2025-08-04 ENCOUNTER — OFFICE VISIT (OUTPATIENT)
Dept: DERMATOLOGY | Facility: CLINIC | Age: 50
End: 2025-08-04
Payer: COMMERCIAL

## 2025-08-04 DIAGNOSIS — L81.4 LENTIGO: ICD-10-CM

## 2025-08-04 DIAGNOSIS — D18.01 ANGIOMA OF SKIN: ICD-10-CM

## 2025-08-04 DIAGNOSIS — Z85.828 HISTORY OF NONMELANOMA SKIN CANCER: ICD-10-CM

## 2025-08-04 DIAGNOSIS — L73.8 SEBACEOUS GLAND HYPERPLASIA: ICD-10-CM

## 2025-08-04 DIAGNOSIS — L82.1 SK (SEBORRHEIC KERATOSIS): Primary | ICD-10-CM

## 2025-08-04 PROCEDURE — 3044F HG A1C LEVEL LT 7.0%: CPT | Mod: CPTII,S$GLB,, | Performed by: DERMATOLOGY

## 2025-08-04 PROCEDURE — 99999 PR PBB SHADOW E&M-EST. PATIENT-LVL III: CPT | Mod: PBBFAC,,, | Performed by: DERMATOLOGY

## 2025-08-04 PROCEDURE — 1160F RVW MEDS BY RX/DR IN RCRD: CPT | Mod: CPTII,S$GLB,, | Performed by: DERMATOLOGY

## 2025-08-04 PROCEDURE — 99213 OFFICE O/P EST LOW 20 MIN: CPT | Mod: S$GLB,,, | Performed by: DERMATOLOGY

## 2025-08-04 PROCEDURE — 1159F MED LIST DOCD IN RCRD: CPT | Mod: CPTII,S$GLB,, | Performed by: DERMATOLOGY

## 2025-08-04 NOTE — PROGRESS NOTES
Subjective:      Patient ID:  Darek Mcnair is a 50 y.o. male who presents for   Chief Complaint   Patient presents with    Skin Check     UBSE      History of Present Illness: The patient presents for follow up of skin check.    The patient was last seen on: 11/27/23 for UBSE.   This is a high risk patient here to check for the development of new lesions.     Other skin complaints:   Patient with new area of concern:   Location: post neck   Previous treatments: none         Review of Systems   Skin:  Positive for activity-related sunscreen use and wears hat (when outside). Negative for daily sunscreen use and recent sunburn.   Hematologic/Lymphatic: Does not bruise/bleed easily.       Objective:   Physical Exam   Constitutional: He appears well-developed and well-nourished. No distress.   Neurological: He is alert and oriented to person, place, and time. He is not disoriented.   Psychiatric: He has a normal mood and affect.   Skin:   Areas Examined (abnormalities noted in diagram):   Scalp / Hair Palpated and Inspected  Head / Face Inspection Performed  Neck Inspection Performed  Chest / Axilla Inspection Performed  Back Inspection Performed  RUE Inspected  LUE Inspection Performed  Nails and Digits Inspection Performed                 Diagram Legend     Erythematous scaling macule/papule c/w actinic keratosis       Vascular papule c/w angioma      Pigmented verrucoid papule/plaque c/w seborrheic keratosis      Yellow umbilicated papule c/w sebaceous hyperplasia      Irregularly shaped tan macule c/w lentigo     1-2 mm smooth white papules consistent with Milia      Movable subcutaneous cyst with punctum c/w epidermal inclusion cyst      Subcutaneous movable cyst c/w pilar cyst      Firm pink to brown papule c/w dermatofibroma      Pedunculated fleshy papule(s) c/w skin tag(s)      Evenly pigmented macule c/w junctional nevus     Mildly variegated pigmented, slightly irregular-bordered macule c/w mildly  atypical nevus      Flesh colored to evenly pigmented papule c/w intradermal nevus       Pink pearly papule/plaque c/w basal cell carcinoma      Erythematous hyperkeratotic cursted plaque c/w SCC      Surgical scar with no sign of skin cancer recurrence      Open and closed comedones      Inflammatory papules and pustules      Verrucoid papule consistent consistent with wart     Erythematous eczematous patches and plaques     Dystrophic onycholytic nail with subungual debris c/w onychomycosis     Umbilicated papule    Erythematous-base heme-crusted tan verrucoid plaque consistent with inflamed seborrheic keratosis     Erythematous Silvery Scaling Plaque c/w Psoriasis     See annotation      Assessment / Plan:        SK (seborrheic keratosis)  These are benign inherited growths without a malignant potential. Reassurance given to patient. No treatment is necessary.     Sebaceous gland hyperplasia  This is a common condition representing benign enlargement of the sebaceous lobule. It typically occurs in adulthood. Reassurance given to patient.     Angioma of skin   - minor problem and chronic.   Reassurance given to patient. No treatment necessary.     Lentigo   - minor problem and chronic.   Reassurance given to patient. No treatment necessary.     History of nonmelanoma skin cancer  Area(s) of previous NMSC evaluated with no signs of recurrence.    Upper body skin examination performed today including at least 6 points as noted in physical examination. No lesions suspicious for malignancy noted.    Recommend daily sun protection/avoidance and use of at least SPF 30, broad spectrum sunscreen (OTC drug).             Follow up in about 1 year (around 8/4/2026) for UBSE.